# Patient Record
Sex: FEMALE | Race: BLACK OR AFRICAN AMERICAN | NOT HISPANIC OR LATINO | Employment: UNEMPLOYED | RURAL
[De-identification: names, ages, dates, MRNs, and addresses within clinical notes are randomized per-mention and may not be internally consistent; named-entity substitution may affect disease eponyms.]

---

## 2016-10-06 LAB
CRC RECOMMENDATION EXT: NORMAL
HM COLONOSCOPY: NORMAL

## 2018-06-26 ENCOUNTER — HISTORICAL (OUTPATIENT)
Dept: ADMINISTRATIVE | Facility: HOSPITAL | Age: 53
End: 2018-06-26

## 2018-06-28 LAB
LAB AP CLINICAL INFORMATION: NORMAL
LAB AP GENERAL CAT - HISTORICAL: NORMAL
LAB AP INTERPRETATION/RESULT - HISTORICAL: NEGATIVE
LAB AP SPECIMEN ADEQUACY - HISTORICAL: NORMAL
LAB AP SPECIMEN SUBMITTED - HISTORICAL: NORMAL

## 2018-08-06 LAB — HM MAMMOGRAM: NORMAL

## 2020-03-11 ENCOUNTER — HISTORICAL (OUTPATIENT)
Dept: ADMINISTRATIVE | Facility: HOSPITAL | Age: 55
End: 2020-03-11

## 2020-05-14 ENCOUNTER — HISTORICAL (OUTPATIENT)
Dept: ADMINISTRATIVE | Facility: HOSPITAL | Age: 55
End: 2020-05-14

## 2020-06-01 ENCOUNTER — HISTORICAL (OUTPATIENT)
Dept: ADMINISTRATIVE | Facility: HOSPITAL | Age: 55
End: 2020-06-01

## 2020-06-01 LAB
GLUCOSE SERPL-MCNC: 113 MG/DL (ref 70–105)
GLUCOSE SERPL-MCNC: 149 MG/DL (ref 70–105)

## 2020-08-11 ENCOUNTER — HISTORICAL (OUTPATIENT)
Dept: ADMINISTRATIVE | Facility: HOSPITAL | Age: 55
End: 2020-08-11

## 2020-08-31 ENCOUNTER — HISTORICAL (OUTPATIENT)
Dept: ADMINISTRATIVE | Facility: HOSPITAL | Age: 55
End: 2020-08-31

## 2020-09-11 ENCOUNTER — HISTORICAL (OUTPATIENT)
Dept: ADMINISTRATIVE | Facility: HOSPITAL | Age: 55
End: 2020-09-11

## 2020-09-11 LAB — GLUCOSE SERPL-MCNC: 99 MG/DL (ref 70–105)

## 2020-09-14 LAB

## 2020-09-28 ENCOUNTER — HISTORICAL (OUTPATIENT)
Dept: ADMINISTRATIVE | Facility: HOSPITAL | Age: 55
End: 2020-09-28

## 2020-11-24 ENCOUNTER — HISTORICAL (OUTPATIENT)
Dept: ADMINISTRATIVE | Facility: HOSPITAL | Age: 55
End: 2020-11-24

## 2021-05-10 VITALS
HEART RATE: 85 BPM | OXYGEN SATURATION: 99 % | HEIGHT: 59 IN | WEIGHT: 207 LBS | TEMPERATURE: 98 F | SYSTOLIC BLOOD PRESSURE: 115 MMHG | BODY MASS INDEX: 41.73 KG/M2 | RESPIRATION RATE: 20 BRPM | DIASTOLIC BLOOD PRESSURE: 80 MMHG

## 2021-05-10 RX ORDER — LOVASTATIN 40 MG/1
40 TABLET ORAL NIGHTLY
COMMUNITY
End: 2021-05-17 | Stop reason: SDUPTHER

## 2021-05-10 RX ORDER — DAPAGLIFLOZIN 10 MG/1
10 TABLET, FILM COATED ORAL DAILY
COMMUNITY
End: 2021-05-18

## 2021-05-10 RX ORDER — ASPIRIN 81 MG/1
81 TABLET ORAL DAILY
COMMUNITY
End: 2021-05-17 | Stop reason: SDUPTHER

## 2021-05-10 RX ORDER — HYDROCHLOROTHIAZIDE 25 MG/1
25 TABLET ORAL DAILY
COMMUNITY
End: 2021-05-17 | Stop reason: SDUPTHER

## 2021-05-10 RX ORDER — METFORMIN HYDROCHLORIDE 1000 MG/1
1000 TABLET ORAL 2 TIMES DAILY
COMMUNITY
End: 2021-05-17 | Stop reason: SDUPTHER

## 2021-05-10 RX ORDER — ALBUTEROL SULFATE 90 UG/1
2 AEROSOL, METERED RESPIRATORY (INHALATION) EVERY 6 HOURS PRN
COMMUNITY
End: 2021-05-17 | Stop reason: SDUPTHER

## 2021-05-10 RX ORDER — ESOMEPRAZOLE MAGNESIUM 40 MG/1
40 GRANULE, DELAYED RELEASE ORAL DAILY
COMMUNITY
End: 2021-05-17 | Stop reason: SDUPTHER

## 2021-05-10 RX ORDER — ERGOCALCIFEROL 1.25 MG/1
50000 CAPSULE ORAL
COMMUNITY
End: 2021-05-17 | Stop reason: SDUPTHER

## 2021-05-17 ENCOUNTER — OFFICE VISIT (OUTPATIENT)
Dept: FAMILY MEDICINE | Facility: CLINIC | Age: 56
End: 2021-05-17
Payer: MEDICARE

## 2021-05-17 VITALS
BODY MASS INDEX: 44.23 KG/M2 | SYSTOLIC BLOOD PRESSURE: 114 MMHG | DIASTOLIC BLOOD PRESSURE: 77 MMHG | WEIGHT: 205 LBS | HEART RATE: 73 BPM | TEMPERATURE: 97 F | HEIGHT: 57 IN | RESPIRATION RATE: 20 BRPM

## 2021-05-17 DIAGNOSIS — E11.69 HYPERLIPIDEMIA ASSOCIATED WITH TYPE 2 DIABETES MELLITUS: ICD-10-CM

## 2021-05-17 DIAGNOSIS — I10 ESSENTIAL HYPERTENSION: ICD-10-CM

## 2021-05-17 DIAGNOSIS — E11.65 TYPE 2 DIABETES MELLITUS WITH HYPERGLYCEMIA, WITHOUT LONG-TERM CURRENT USE OF INSULIN: Primary | ICD-10-CM

## 2021-05-17 DIAGNOSIS — K21.9 GASTROESOPHAGEAL REFLUX DISEASE, UNSPECIFIED WHETHER ESOPHAGITIS PRESENT: ICD-10-CM

## 2021-05-17 DIAGNOSIS — E55.9 VITAMIN D DEFICIENCY: ICD-10-CM

## 2021-05-17 DIAGNOSIS — E78.5 HYPERLIPIDEMIA ASSOCIATED WITH TYPE 2 DIABETES MELLITUS: ICD-10-CM

## 2021-05-17 DIAGNOSIS — E53.8 B12 DEFICIENCY: ICD-10-CM

## 2021-05-17 LAB
ALBUMIN SERPL BCP-MCNC: 3.5 G/DL (ref 3.5–5)
ALBUMIN/GLOB SERPL: 0.8 {RATIO}
ALP SERPL-CCNC: 122 U/L (ref 41–108)
ALT SERPL W P-5'-P-CCNC: 23 U/L (ref 13–56)
ANION GAP SERPL CALCULATED.3IONS-SCNC: 13 MMOL/L (ref 7–16)
AST SERPL W P-5'-P-CCNC: 12 U/L (ref 15–37)
BILIRUB SERPL-MCNC: 0.4 MG/DL (ref 0–1.2)
BUN SERPL-MCNC: 22 MG/DL (ref 7–18)
BUN/CREAT SERPL: 28 (ref 6–20)
CALCIUM SERPL-MCNC: 9.4 MG/DL (ref 8.5–10.1)
CHLORIDE SERPL-SCNC: 103 MMOL/L (ref 98–107)
CHOLEST SERPL-MCNC: 185 MG/DL (ref 0–200)
CHOLEST/HDLC SERPL: 2.6 {RATIO}
CO2 SERPL-SCNC: 27 MMOL/L (ref 21–32)
CREAT SERPL-MCNC: 0.79 MG/DL (ref 0.55–1.02)
CREAT UR-MCNC: 153 MG/DL (ref 28–219)
EST. AVERAGE GLUCOSE BLD GHB EST-MCNC: 137 MG/DL
GLOBULIN SER-MCNC: 4.4 G/DL (ref 2–4)
GLUCOSE SERPL-MCNC: 129 MG/DL (ref 74–106)
HBA1C MFR BLD HPLC: 6.7 % (ref 4.5–6.6)
HDLC SERPL-MCNC: 71 MG/DL (ref 40–60)
LDLC SERPL CALC-MCNC: 100 MG/DL
LDLC/HDLC SERPL: 1.4 {RATIO}
MICROALBUMIN UR-MCNC: 0.8 MG/DL (ref 0–2.8)
MICROALBUMIN/CREAT RATIO PNL UR: 5.2 MG/G (ref 0–30)
NONHDLC SERPL-MCNC: 114 MG/DL
POTASSIUM SERPL-SCNC: 3.7 MMOL/L (ref 3.5–5.1)
PROT SERPL-MCNC: 7.9 G/DL (ref 6.4–8.2)
SODIUM SERPL-SCNC: 139 MMOL/L (ref 136–145)
TRIGL SERPL-MCNC: 69 MG/DL (ref 35–150)
VLDLC SERPL-MCNC: 14 MG/DL

## 2021-05-17 PROCEDURE — 83036 HEMOGLOBIN A1C: ICD-10-PCS | Mod: ,,, | Performed by: CLINICAL MEDICAL LABORATORY

## 2021-05-17 PROCEDURE — 99213 PR OFFICE/OUTPT VISIT, EST, LEVL III, 20-29 MIN: ICD-10-PCS | Mod: ,,, | Performed by: NURSE PRACTITIONER

## 2021-05-17 PROCEDURE — 80061 LIPID PANEL: ICD-10-PCS | Mod: ,,, | Performed by: CLINICAL MEDICAL LABORATORY

## 2021-05-17 PROCEDURE — 83036 HEMOGLOBIN GLYCOSYLATED A1C: CPT | Mod: ,,, | Performed by: CLINICAL MEDICAL LABORATORY

## 2021-05-17 PROCEDURE — 80061 LIPID PANEL: CPT | Mod: ,,, | Performed by: CLINICAL MEDICAL LABORATORY

## 2021-05-17 PROCEDURE — 99213 OFFICE O/P EST LOW 20 MIN: CPT | Mod: ,,, | Performed by: NURSE PRACTITIONER

## 2021-05-17 PROCEDURE — 82043 UR ALBUMIN QUANTITATIVE: CPT | Mod: ,,, | Performed by: CLINICAL MEDICAL LABORATORY

## 2021-05-17 PROCEDURE — 80053 COMPREHEN METABOLIC PANEL: CPT | Mod: ,,, | Performed by: CLINICAL MEDICAL LABORATORY

## 2021-05-17 PROCEDURE — 82570 MICROALBUMIN / CREATININE RATIO URINE: ICD-10-PCS | Mod: ,,, | Performed by: CLINICAL MEDICAL LABORATORY

## 2021-05-17 PROCEDURE — 80053 COMPREHENSIVE METABOLIC PANEL: ICD-10-PCS | Mod: ,,, | Performed by: CLINICAL MEDICAL LABORATORY

## 2021-05-17 PROCEDURE — 82043 MICROALBUMIN / CREATININE RATIO URINE: ICD-10-PCS | Mod: ,,, | Performed by: CLINICAL MEDICAL LABORATORY

## 2021-05-17 PROCEDURE — 82570 ASSAY OF URINE CREATININE: CPT | Mod: ,,, | Performed by: CLINICAL MEDICAL LABORATORY

## 2021-05-17 RX ORDER — HYDROCHLOROTHIAZIDE 25 MG/1
25 TABLET ORAL DAILY
Qty: 90 TABLET | Refills: 0 | Status: SHIPPED | OUTPATIENT
Start: 2021-05-17 | End: 2021-08-17 | Stop reason: SDUPTHER

## 2021-05-17 RX ORDER — ESOMEPRAZOLE MAGNESIUM 40 MG/1
40 GRANULE, DELAYED RELEASE ORAL DAILY
Qty: 90 EACH | Refills: 0 | Status: SHIPPED | OUTPATIENT
Start: 2021-05-17 | End: 2021-08-17 | Stop reason: SDUPTHER

## 2021-05-17 RX ORDER — GLIPIZIDE 5 MG/1
5 TABLET ORAL 2 TIMES DAILY WITH MEALS
COMMUNITY
Start: 2021-05-03 | End: 2021-08-17 | Stop reason: SDUPTHER

## 2021-05-17 RX ORDER — ALBUTEROL SULFATE 90 UG/1
2 AEROSOL, METERED RESPIRATORY (INHALATION) EVERY 6 HOURS PRN
Qty: 18 G | Refills: 0 | Status: SHIPPED | OUTPATIENT
Start: 2021-05-17 | End: 2022-02-01

## 2021-05-17 RX ORDER — ERGOCALCIFEROL 1.25 MG/1
50000 CAPSULE ORAL
Qty: 90 CAPSULE | Refills: 0 | Status: SHIPPED | OUTPATIENT
Start: 2021-05-17 | End: 2021-08-17 | Stop reason: SDUPTHER

## 2021-05-17 RX ORDER — METFORMIN HYDROCHLORIDE 1000 MG/1
1000 TABLET ORAL 2 TIMES DAILY
Qty: 180 TABLET | Refills: 0 | Status: SHIPPED | OUTPATIENT
Start: 2021-05-17 | End: 2021-08-17 | Stop reason: SDUPTHER

## 2021-05-17 RX ORDER — LOVASTATIN 40 MG/1
40 TABLET ORAL NIGHTLY
Qty: 90 TABLET | Refills: 0 | Status: SHIPPED | OUTPATIENT
Start: 2021-05-17 | End: 2021-08-17 | Stop reason: SDUPTHER

## 2021-05-17 RX ORDER — ASPIRIN 81 MG/1
81 TABLET ORAL DAILY
Qty: 90 TABLET | Refills: 0 | Status: SHIPPED | OUTPATIENT
Start: 2021-05-17 | End: 2022-05-04 | Stop reason: SDUPTHER

## 2021-05-17 RX ORDER — HYDROCODONE BITARTRATE AND ACETAMINOPHEN 10; 325 MG/1; MG/1
1 TABLET ORAL DAILY PRN
COMMUNITY
Start: 2021-05-08

## 2021-05-18 ENCOUNTER — OFFICE VISIT (OUTPATIENT)
Dept: CARDIOLOGY | Facility: CLINIC | Age: 56
End: 2021-05-18
Payer: MEDICARE

## 2021-05-18 VITALS
WEIGHT: 206 LBS | BODY MASS INDEX: 44.44 KG/M2 | HEART RATE: 71 BPM | OXYGEN SATURATION: 98 % | DIASTOLIC BLOOD PRESSURE: 60 MMHG | SYSTOLIC BLOOD PRESSURE: 94 MMHG | HEIGHT: 57 IN

## 2021-05-18 DIAGNOSIS — I10 HYPERTENSION, UNSPECIFIED TYPE: Primary | ICD-10-CM

## 2021-05-18 PROCEDURE — 93010 ELECTROCARDIOGRAM REPORT: CPT | Mod: S$PBB,,, | Performed by: INTERNAL MEDICINE

## 2021-05-18 PROCEDURE — 93010 EKG 12-LEAD: ICD-10-PCS | Mod: S$PBB,,, | Performed by: INTERNAL MEDICINE

## 2021-05-18 PROCEDURE — 99213 PR OFFICE/OUTPT VISIT, EST, LEVL III, 20-29 MIN: ICD-10-PCS | Mod: S$PBB,,, | Performed by: NURSE PRACTITIONER

## 2021-05-18 PROCEDURE — 99213 OFFICE O/P EST LOW 20 MIN: CPT | Mod: PBBFAC | Performed by: NURSE PRACTITIONER

## 2021-05-18 PROCEDURE — 99213 OFFICE O/P EST LOW 20 MIN: CPT | Mod: S$PBB,,, | Performed by: NURSE PRACTITIONER

## 2021-05-18 PROCEDURE — 93005 ELECTROCARDIOGRAM TRACING: CPT | Mod: PBBFAC | Performed by: INTERNAL MEDICINE

## 2021-05-25 ENCOUNTER — TELEPHONE (OUTPATIENT)
Dept: FAMILY MEDICINE | Facility: CLINIC | Age: 56
End: 2021-05-25

## 2021-05-25 VITALS
TEMPERATURE: 98 F | DIASTOLIC BLOOD PRESSURE: 80 MMHG | HEART RATE: 71 BPM | WEIGHT: 207 LBS | BODY MASS INDEX: 41.73 KG/M2 | SYSTOLIC BLOOD PRESSURE: 115 MMHG | HEIGHT: 59 IN

## 2021-05-25 RX ORDER — MELOXICAM 15 MG/1
TABLET ORAL
COMMUNITY
Start: 2021-01-08 | End: 2021-08-17 | Stop reason: SDUPTHER

## 2021-05-25 RX ORDER — OMEPRAZOLE 40 MG/1
CAPSULE, DELAYED RELEASE ORAL
COMMUNITY
Start: 2021-01-08 | End: 2022-05-04

## 2021-05-25 RX ORDER — ESOMEPRAZOLE MAGNESIUM 40 MG/1
CAPSULE, DELAYED RELEASE ORAL
COMMUNITY
Start: 2021-05-17 | End: 2021-08-17 | Stop reason: SDUPTHER

## 2021-05-25 RX ORDER — LISINOPRIL 10 MG/1
TABLET ORAL
COMMUNITY
Start: 2021-01-08 | End: 2021-08-17 | Stop reason: SDUPTHER

## 2021-06-01 ENCOUNTER — TELEPHONE (OUTPATIENT)
Dept: FAMILY MEDICINE | Facility: CLINIC | Age: 56
End: 2021-06-01

## 2021-06-07 ENCOUNTER — TELEPHONE (OUTPATIENT)
Dept: FAMILY MEDICINE | Facility: CLINIC | Age: 56
End: 2021-06-07

## 2021-06-07 ENCOUNTER — OFFICE VISIT (OUTPATIENT)
Dept: FAMILY MEDICINE | Facility: CLINIC | Age: 56
End: 2021-06-07
Payer: MEDICARE

## 2021-06-07 VITALS
WEIGHT: 204 LBS | HEART RATE: 83 BPM | BODY MASS INDEX: 44.01 KG/M2 | HEIGHT: 57 IN | RESPIRATION RATE: 20 BRPM | SYSTOLIC BLOOD PRESSURE: 111 MMHG | DIASTOLIC BLOOD PRESSURE: 79 MMHG | TEMPERATURE: 98 F

## 2021-06-07 DIAGNOSIS — F32.A DEPRESSION, UNSPECIFIED DEPRESSION TYPE: Primary | ICD-10-CM

## 2021-06-07 DIAGNOSIS — N76.0 ACUTE VAGINITIS: ICD-10-CM

## 2021-06-07 PROCEDURE — 99213 PR OFFICE/OUTPT VISIT, EST, LEVL III, 20-29 MIN: ICD-10-PCS | Mod: ,,, | Performed by: NURSE PRACTITIONER

## 2021-06-07 PROCEDURE — 99213 OFFICE O/P EST LOW 20 MIN: CPT | Mod: ,,, | Performed by: NURSE PRACTITIONER

## 2021-06-07 RX ORDER — CARIPRAZINE 1.5 MG/1
1.5 CAPSULE, GELATIN COATED ORAL DAILY
Qty: 90 CAPSULE | Refills: 0 | Status: SHIPPED | OUTPATIENT
Start: 2021-06-07 | End: 2021-07-07

## 2021-06-07 RX ORDER — FLUCONAZOLE 200 MG/1
200 TABLET ORAL DAILY
Qty: 7 TABLET | Refills: 0 | Status: SHIPPED | OUTPATIENT
Start: 2021-06-07 | End: 2021-06-14

## 2021-06-15 ENCOUNTER — TELEPHONE (OUTPATIENT)
Dept: FAMILY MEDICINE | Facility: CLINIC | Age: 56
End: 2021-06-15

## 2021-08-17 ENCOUNTER — OFFICE VISIT (OUTPATIENT)
Dept: FAMILY MEDICINE | Facility: CLINIC | Age: 56
End: 2021-08-17
Payer: MEDICARE

## 2021-08-17 VITALS
HEART RATE: 65 BPM | BODY MASS INDEX: 46.17 KG/M2 | DIASTOLIC BLOOD PRESSURE: 68 MMHG | HEIGHT: 57 IN | RESPIRATION RATE: 18 BRPM | WEIGHT: 214 LBS | TEMPERATURE: 98 F | SYSTOLIC BLOOD PRESSURE: 101 MMHG

## 2021-08-17 DIAGNOSIS — E11.69 HYPERLIPIDEMIA ASSOCIATED WITH TYPE 2 DIABETES MELLITUS: ICD-10-CM

## 2021-08-17 DIAGNOSIS — I10 ESSENTIAL HYPERTENSION: ICD-10-CM

## 2021-08-17 DIAGNOSIS — K58.9 IRRITABLE BOWEL SYNDROME, UNSPECIFIED TYPE: Primary | ICD-10-CM

## 2021-08-17 DIAGNOSIS — K21.9 GASTROESOPHAGEAL REFLUX DISEASE, UNSPECIFIED WHETHER ESOPHAGITIS PRESENT: ICD-10-CM

## 2021-08-17 DIAGNOSIS — E11.65 TYPE 2 DIABETES MELLITUS WITH HYPERGLYCEMIA, WITHOUT LONG-TERM CURRENT USE OF INSULIN: ICD-10-CM

## 2021-08-17 DIAGNOSIS — E78.5 HYPERLIPIDEMIA ASSOCIATED WITH TYPE 2 DIABETES MELLITUS: ICD-10-CM

## 2021-08-17 DIAGNOSIS — E55.9 VITAMIN D DEFICIENCY: ICD-10-CM

## 2021-08-17 LAB
ALBUMIN SERPL BCP-MCNC: 3.2 G/DL (ref 3.5–5)
ALBUMIN/GLOB SERPL: 0.7 {RATIO}
ALP SERPL-CCNC: 102 U/L (ref 41–108)
ALT SERPL W P-5'-P-CCNC: 26 U/L (ref 13–56)
ANION GAP SERPL CALCULATED.3IONS-SCNC: 11 MMOL/L (ref 7–16)
AST SERPL W P-5'-P-CCNC: 15 U/L (ref 15–37)
BILIRUB SERPL-MCNC: 0.1 MG/DL (ref 0–1.2)
BUN SERPL-MCNC: 22 MG/DL (ref 7–18)
BUN/CREAT SERPL: 24 (ref 6–20)
CALCIUM SERPL-MCNC: 9.2 MG/DL (ref 8.5–10.1)
CHLORIDE SERPL-SCNC: 105 MMOL/L (ref 98–107)
CHOLEST SERPL-MCNC: 174 MG/DL (ref 0–200)
CHOLEST/HDLC SERPL: 2.3 {RATIO}
CO2 SERPL-SCNC: 25 MMOL/L (ref 21–32)
CREAT SERPL-MCNC: 0.92 MG/DL (ref 0.55–1.02)
GLOBULIN SER-MCNC: 4.3 G/DL (ref 2–4)
GLUCOSE SERPL-MCNC: 158 MG/DL (ref 74–106)
HDLC SERPL-MCNC: 75 MG/DL (ref 40–60)
LDLC SERPL CALC-MCNC: 87 MG/DL
LDLC/HDLC SERPL: 1.2 {RATIO}
NONHDLC SERPL-MCNC: 99 MG/DL
POTASSIUM SERPL-SCNC: 4.4 MMOL/L (ref 3.5–5.1)
PROT SERPL-MCNC: 7.5 G/DL (ref 6.4–8.2)
SODIUM SERPL-SCNC: 137 MMOL/L (ref 136–145)
TRIGL SERPL-MCNC: 61 MG/DL (ref 35–150)
VLDLC SERPL-MCNC: 12 MG/DL

## 2021-08-17 PROCEDURE — 80061 LIPID PANEL: ICD-10-PCS | Mod: ,,, | Performed by: CLINICAL MEDICAL LABORATORY

## 2021-08-17 PROCEDURE — 99214 PR OFFICE/OUTPT VISIT, EST, LEVL IV, 30-39 MIN: ICD-10-PCS | Mod: ,,, | Performed by: NURSE PRACTITIONER

## 2021-08-17 PROCEDURE — 82043 UR ALBUMIN QUANTITATIVE: CPT | Mod: ,,, | Performed by: CLINICAL MEDICAL LABORATORY

## 2021-08-17 PROCEDURE — 80053 COMPREHEN METABOLIC PANEL: CPT | Mod: ,,, | Performed by: CLINICAL MEDICAL LABORATORY

## 2021-08-17 PROCEDURE — 83036 HEMOGLOBIN GLYCOSYLATED A1C: CPT | Mod: ,,, | Performed by: CLINICAL MEDICAL LABORATORY

## 2021-08-17 PROCEDURE — 83036 HEMOGLOBIN A1C: ICD-10-PCS | Mod: ,,, | Performed by: CLINICAL MEDICAL LABORATORY

## 2021-08-17 PROCEDURE — 82043 MICROALBUMIN / CREATININE RATIO URINE: ICD-10-PCS | Mod: ,,, | Performed by: CLINICAL MEDICAL LABORATORY

## 2021-08-17 PROCEDURE — 82570 ASSAY OF URINE CREATININE: CPT | Mod: ,,, | Performed by: CLINICAL MEDICAL LABORATORY

## 2021-08-17 PROCEDURE — 82570 MICROALBUMIN / CREATININE RATIO URINE: ICD-10-PCS | Mod: ,,, | Performed by: CLINICAL MEDICAL LABORATORY

## 2021-08-17 PROCEDURE — 99214 OFFICE O/P EST MOD 30 MIN: CPT | Mod: ,,, | Performed by: NURSE PRACTITIONER

## 2021-08-17 PROCEDURE — 80061 LIPID PANEL: CPT | Mod: ,,, | Performed by: CLINICAL MEDICAL LABORATORY

## 2021-08-17 PROCEDURE — 80053 COMPREHENSIVE METABOLIC PANEL: ICD-10-PCS | Mod: ,,, | Performed by: CLINICAL MEDICAL LABORATORY

## 2021-08-17 RX ORDER — GLIPIZIDE 5 MG/1
5 TABLET ORAL 2 TIMES DAILY WITH MEALS
Qty: 180 TABLET | Refills: 0 | Status: SHIPPED | OUTPATIENT
Start: 2021-08-17 | End: 2021-11-16 | Stop reason: SDUPTHER

## 2021-08-17 RX ORDER — HYDROCHLOROTHIAZIDE 25 MG/1
25 TABLET ORAL DAILY
Qty: 90 TABLET | Refills: 0 | Status: SHIPPED | OUTPATIENT
Start: 2021-08-17 | End: 2021-11-16 | Stop reason: SDUPTHER

## 2021-08-17 RX ORDER — ERGOCALCIFEROL 1.25 MG/1
50000 CAPSULE ORAL
Qty: 90 CAPSULE | Refills: 0 | Status: SHIPPED | OUTPATIENT
Start: 2021-08-17 | End: 2022-05-04 | Stop reason: SDUPTHER

## 2021-08-17 RX ORDER — MELOXICAM 15 MG/1
15 TABLET ORAL DAILY
Qty: 90 TABLET | Refills: 1 | Status: SHIPPED | OUTPATIENT
Start: 2021-08-17 | End: 2022-02-01

## 2021-08-17 RX ORDER — ESOMEPRAZOLE MAGNESIUM 40 MG/1
40 GRANULE, DELAYED RELEASE ORAL DAILY
Qty: 90 EACH | Refills: 0 | Status: SHIPPED | OUTPATIENT
Start: 2021-08-17 | End: 2022-05-04

## 2021-08-17 RX ORDER — LISINOPRIL 10 MG/1
10 TABLET ORAL DAILY
Qty: 90 TABLET | Refills: 1 | Status: SHIPPED | OUTPATIENT
Start: 2021-08-17 | End: 2022-02-01

## 2021-08-17 RX ORDER — LOVASTATIN 40 MG/1
40 TABLET ORAL NIGHTLY
Qty: 90 TABLET | Refills: 0 | Status: SHIPPED | OUTPATIENT
Start: 2021-08-17 | End: 2021-11-16 | Stop reason: SDUPTHER

## 2021-08-17 RX ORDER — PLECANATIDE 3 MG/1
3 TABLET ORAL DAILY
Qty: 90 TABLET | Refills: 1 | Status: SHIPPED | OUTPATIENT
Start: 2021-08-17 | End: 2021-11-15

## 2021-08-17 RX ORDER — ESOMEPRAZOLE MAGNESIUM 40 MG/1
40 CAPSULE, DELAYED RELEASE ORAL DAILY
Qty: 90 CAPSULE | Refills: 1 | Status: SHIPPED | OUTPATIENT
Start: 2021-08-17 | End: 2022-02-01 | Stop reason: SDUPTHER

## 2021-08-17 RX ORDER — ZOSTER VACCINE RECOMBINANT, ADJUVANTED 50 MCG/0.5
0.5 KIT INTRAMUSCULAR ONCE
Qty: 1 EACH | Refills: 1 | Status: SHIPPED | OUTPATIENT
Start: 2021-08-17 | End: 2021-08-17

## 2021-08-17 RX ORDER — METFORMIN HYDROCHLORIDE 1000 MG/1
1000 TABLET ORAL 2 TIMES DAILY
Qty: 180 TABLET | Refills: 0 | Status: SHIPPED | OUTPATIENT
Start: 2021-08-17 | End: 2021-11-16 | Stop reason: SDUPTHER

## 2021-08-18 LAB
CREAT UR-MCNC: 116 MG/DL (ref 28–219)
EST. AVERAGE GLUCOSE BLD GHB EST-MCNC: 147 MG/DL
HBA1C MFR BLD HPLC: 7 % (ref 4.5–6.6)
MICROALBUMIN UR-MCNC: 0.6 MG/DL (ref 0–2.8)
MICROALBUMIN/CREAT RATIO PNL UR: 5.2 MG/G (ref 0–30)

## 2021-09-07 ENCOUNTER — HOSPITAL ENCOUNTER (OUTPATIENT)
Dept: RADIOLOGY | Facility: HOSPITAL | Age: 56
Discharge: HOME | End: 2021-09-07
Payer: MEDICARE

## 2021-09-07 VITALS — WEIGHT: 216 LBS | HEIGHT: 57 IN | BODY MASS INDEX: 46.6 KG/M2

## 2021-09-07 DIAGNOSIS — Z12.31 BREAST CANCER SCREENING BY MAMMOGRAM: ICD-10-CM

## 2021-09-07 PROCEDURE — 77067 SCR MAMMO BI INCL CAD: CPT | Mod: TC

## 2021-09-15 ENCOUNTER — OFFICE VISIT (OUTPATIENT)
Dept: FAMILY MEDICINE | Facility: CLINIC | Age: 56
End: 2021-09-15
Payer: MEDICARE

## 2021-09-15 VITALS
DIASTOLIC BLOOD PRESSURE: 74 MMHG | TEMPERATURE: 97 F | RESPIRATION RATE: 18 BRPM | HEIGHT: 57 IN | SYSTOLIC BLOOD PRESSURE: 118 MMHG | HEART RATE: 73 BPM | BODY MASS INDEX: 46.38 KG/M2 | WEIGHT: 215 LBS

## 2021-09-15 DIAGNOSIS — Z23 ENCOUNTER FOR IMMUNIZATION: Primary | ICD-10-CM

## 2021-09-15 DIAGNOSIS — Z12.4 ENCOUNTER FOR PAPANICOLAOU SMEAR OF CERVIX: Primary | ICD-10-CM

## 2021-09-15 DIAGNOSIS — Z11.59 SCREENING FOR VIRAL DISEASE: ICD-10-CM

## 2021-09-15 PROCEDURE — G0008 FLU VACCINE (QUAD) GREATER THAN OR EQUAL TO 3YO PRESERVATIVE FREE IM: ICD-10-PCS | Mod: ,,, | Performed by: NURSE PRACTITIONER

## 2021-09-15 PROCEDURE — 87591 N.GONORRHOEAE DNA AMP PROB: CPT | Mod: ,,, | Performed by: CLINICAL MEDICAL LABORATORY

## 2021-09-15 PROCEDURE — 87624 HPV HI-RISK TYP POOLED RSLT: CPT | Mod: ,,, | Performed by: CLINICAL MEDICAL LABORATORY

## 2021-09-15 PROCEDURE — G0008 ADMIN INFLUENZA VIRUS VAC: HCPCS | Mod: ,,, | Performed by: NURSE PRACTITIONER

## 2021-09-15 PROCEDURE — 87591 CHLAMYDIA/GONORRHOEAE(GC), PCR: ICD-10-PCS | Mod: ,,, | Performed by: CLINICAL MEDICAL LABORATORY

## 2021-09-15 PROCEDURE — 90686 FLU VACCINE (QUAD) GREATER THAN OR EQUAL TO 3YO PRESERVATIVE FREE IM: ICD-10-PCS | Mod: ,,, | Performed by: NURSE PRACTITIONER

## 2021-09-15 PROCEDURE — 99213 OFFICE O/P EST LOW 20 MIN: CPT | Mod: ,,, | Performed by: NURSE PRACTITIONER

## 2021-09-15 PROCEDURE — 87624 HUMAN PAPILLOMAVIRUS (HPV): ICD-10-PCS | Mod: ,,, | Performed by: CLINICAL MEDICAL LABORATORY

## 2021-09-15 PROCEDURE — 99213 PR OFFICE/OUTPT VISIT, EST, LEVL III, 20-29 MIN: ICD-10-PCS | Mod: ,,, | Performed by: NURSE PRACTITIONER

## 2021-09-15 PROCEDURE — G0123 SCREEN CERV/VAG THIN LAYER: HCPCS | Mod: GCY | Performed by: NURSE PRACTITIONER

## 2021-09-15 PROCEDURE — 90686 IIV4 VACC NO PRSV 0.5 ML IM: CPT | Mod: ,,, | Performed by: NURSE PRACTITIONER

## 2021-09-15 PROCEDURE — 87491 CHLMYD TRACH DNA AMP PROBE: CPT | Mod: ,,, | Performed by: CLINICAL MEDICAL LABORATORY

## 2021-09-15 PROCEDURE — 87491 CHLAMYDIA/GONORRHOEAE(GC), PCR: ICD-10-PCS | Mod: ,,, | Performed by: CLINICAL MEDICAL LABORATORY

## 2021-09-16 LAB
GH SERPL-MCNC: NORMAL NG/ML
INSULIN SERPL-ACNC: NORMAL U[IU]/ML
LAB AP CLINICAL INFORMATION: NORMAL
LAB AP GYN INTERPRETATION: NEGATIVE
LAB AP PAP DISCLAIMER COMMENTS: NORMAL
RENIN PLAS-CCNC: NORMAL NG/ML/H

## 2021-09-26 LAB
CHLAMYDIA BY PCR: NEGATIVE
HPV 16: NEGATIVE
HPV 18: POSITIVE
HPV OTHER: NEGATIVE
N. GONORRHOEAE (GC) BY PCR: NEGATIVE

## 2021-10-16 DIAGNOSIS — A63.0 HPV (HUMAN PAPILLOMA VIRUS) ANOGENITAL INFECTION: Primary | ICD-10-CM

## 2021-11-16 ENCOUNTER — OFFICE VISIT (OUTPATIENT)
Dept: FAMILY MEDICINE | Facility: CLINIC | Age: 56
End: 2021-11-16
Payer: MEDICARE

## 2021-11-16 VITALS
RESPIRATION RATE: 18 BRPM | DIASTOLIC BLOOD PRESSURE: 84 MMHG | HEIGHT: 57 IN | SYSTOLIC BLOOD PRESSURE: 116 MMHG | HEART RATE: 76 BPM | BODY MASS INDEX: 46.6 KG/M2 | TEMPERATURE: 98 F | WEIGHT: 216 LBS

## 2021-11-16 DIAGNOSIS — Z23 NEED FOR SHINGLES VACCINE: ICD-10-CM

## 2021-11-16 DIAGNOSIS — E11.65 TYPE 2 DIABETES MELLITUS WITH HYPERGLYCEMIA, WITHOUT LONG-TERM CURRENT USE OF INSULIN: ICD-10-CM

## 2021-11-16 DIAGNOSIS — Z11.59 SCREENING FOR VIRAL DISEASE: ICD-10-CM

## 2021-11-16 DIAGNOSIS — B97.7 HPV (HUMAN PAPILLOMA VIRUS) INFECTION: ICD-10-CM

## 2021-11-16 DIAGNOSIS — E11.69 HYPERLIPIDEMIA ASSOCIATED WITH TYPE 2 DIABETES MELLITUS: ICD-10-CM

## 2021-11-16 DIAGNOSIS — E78.5 HYPERLIPIDEMIA ASSOCIATED WITH TYPE 2 DIABETES MELLITUS: ICD-10-CM

## 2021-11-16 DIAGNOSIS — K21.9 GASTROESOPHAGEAL REFLUX DISEASE, UNSPECIFIED WHETHER ESOPHAGITIS PRESENT: ICD-10-CM

## 2021-11-16 DIAGNOSIS — I10 ESSENTIAL HYPERTENSION: Primary | ICD-10-CM

## 2021-11-16 LAB
ALBUMIN SERPL BCP-MCNC: 3.2 G/DL (ref 3.5–5)
ALBUMIN/GLOB SERPL: 0.7 {RATIO}
ALP SERPL-CCNC: 122 U/L (ref 46–118)
ALT SERPL W P-5'-P-CCNC: 25 U/L (ref 13–56)
ANION GAP SERPL CALCULATED.3IONS-SCNC: 12 MMOL/L (ref 7–16)
AST SERPL W P-5'-P-CCNC: 14 U/L (ref 15–37)
BASOPHILS # BLD AUTO: 0.04 K/UL (ref 0–0.2)
BASOPHILS NFR BLD AUTO: 0.6 % (ref 0–1)
BILIRUB SERPL-MCNC: 0.1 MG/DL (ref 0–1.2)
BUN SERPL-MCNC: 22 MG/DL (ref 7–18)
BUN/CREAT SERPL: 20 (ref 6–20)
CALCIUM SERPL-MCNC: 9.4 MG/DL (ref 8.5–10.1)
CHLORIDE SERPL-SCNC: 103 MMOL/L (ref 98–107)
CHOLEST SERPL-MCNC: 164 MG/DL (ref 0–200)
CHOLEST/HDLC SERPL: 2.5 {RATIO}
CO2 SERPL-SCNC: 27 MMOL/L (ref 21–32)
CREAT SERPL-MCNC: 1.08 MG/DL (ref 0.55–1.02)
CREAT UR-MCNC: 174 MG/DL (ref 28–219)
DIFFERENTIAL METHOD BLD: ABNORMAL
EOSINOPHIL # BLD AUTO: 0.2 K/UL (ref 0–0.5)
EOSINOPHIL NFR BLD AUTO: 2.8 % (ref 1–4)
ERYTHROCYTE [DISTWIDTH] IN BLOOD BY AUTOMATED COUNT: 17 % (ref 11.5–14.5)
EST. AVERAGE GLUCOSE BLD GHB EST-MCNC: 164 MG/DL
GLOBULIN SER-MCNC: 4.5 G/DL (ref 2–4)
GLUCOSE SERPL-MCNC: 153 MG/DL (ref 74–106)
HBA1C MFR BLD HPLC: 7.5 % (ref 4.5–6.6)
HCT VFR BLD AUTO: 37.9 % (ref 38–47)
HDLC SERPL-MCNC: 66 MG/DL (ref 40–60)
HGB BLD-MCNC: 11.3 G/DL (ref 12–16)
IMM GRANULOCYTES # BLD AUTO: 0.02 K/UL (ref 0–0.04)
IMM GRANULOCYTES NFR BLD: 0.3 % (ref 0–0.4)
LDLC SERPL CALC-MCNC: 78 MG/DL
LDLC/HDLC SERPL: 1.2 {RATIO}
LYMPHOCYTES # BLD AUTO: 2.79 K/UL (ref 1–4.8)
LYMPHOCYTES NFR BLD AUTO: 38.6 % (ref 27–41)
MCH RBC QN AUTO: 24.6 PG (ref 27–31)
MCHC RBC AUTO-ENTMCNC: 29.8 G/DL (ref 32–36)
MCV RBC AUTO: 82.6 FL (ref 80–96)
MICROALBUMIN UR-MCNC: 0.7 MG/DL (ref 0–2.8)
MICROALBUMIN/CREAT RATIO PNL UR: 4 MG/G (ref 0–30)
MONOCYTES # BLD AUTO: 0.6 K/UL (ref 0–0.8)
MONOCYTES NFR BLD AUTO: 8.3 % (ref 2–6)
MPC BLD CALC-MCNC: 11.4 FL (ref 9.4–12.4)
NEUTROPHILS # BLD AUTO: 3.58 K/UL (ref 1.8–7.7)
NEUTROPHILS NFR BLD AUTO: 49.4 % (ref 53–65)
NONHDLC SERPL-MCNC: 98 MG/DL
NRBC # BLD AUTO: 0 X10E3/UL
NRBC, AUTO (.00): 0 %
PLATELET # BLD AUTO: 317 K/UL (ref 150–400)
POTASSIUM SERPL-SCNC: 4.3 MMOL/L (ref 3.5–5.1)
PROT SERPL-MCNC: 7.7 G/DL (ref 6.4–8.2)
RBC # BLD AUTO: 4.59 M/UL (ref 4.2–5.4)
SODIUM SERPL-SCNC: 138 MMOL/L (ref 136–145)
TRIGL SERPL-MCNC: 99 MG/DL (ref 35–150)
VLDLC SERPL-MCNC: 20 MG/DL
WBC # BLD AUTO: 7.23 K/UL (ref 4.5–11)

## 2021-11-16 PROCEDURE — 80053 COMPREHEN METABOLIC PANEL: CPT | Mod: ,,, | Performed by: CLINICAL MEDICAL LABORATORY

## 2021-11-16 PROCEDURE — 99214 OFFICE O/P EST MOD 30 MIN: CPT | Mod: ,,, | Performed by: NURSE PRACTITIONER

## 2021-11-16 PROCEDURE — 80061 LIPID PANEL: CPT | Mod: ,,, | Performed by: CLINICAL MEDICAL LABORATORY

## 2021-11-16 PROCEDURE — 82043 UR ALBUMIN QUANTITATIVE: CPT | Mod: ,,, | Performed by: CLINICAL MEDICAL LABORATORY

## 2021-11-16 PROCEDURE — 82570 MICROALBUMIN / CREATININE RATIO URINE: ICD-10-PCS | Mod: ,,, | Performed by: CLINICAL MEDICAL LABORATORY

## 2021-11-16 PROCEDURE — 82043 MICROALBUMIN / CREATININE RATIO URINE: ICD-10-PCS | Mod: ,,, | Performed by: CLINICAL MEDICAL LABORATORY

## 2021-11-16 PROCEDURE — 82570 ASSAY OF URINE CREATININE: CPT | Mod: ,,, | Performed by: CLINICAL MEDICAL LABORATORY

## 2021-11-16 PROCEDURE — 80053 COMPREHENSIVE METABOLIC PANEL: ICD-10-PCS | Mod: ,,, | Performed by: CLINICAL MEDICAL LABORATORY

## 2021-11-16 PROCEDURE — 83036 HEMOGLOBIN A1C: ICD-10-PCS | Mod: ,,, | Performed by: CLINICAL MEDICAL LABORATORY

## 2021-11-16 PROCEDURE — 83036 HEMOGLOBIN GLYCOSYLATED A1C: CPT | Mod: ,,, | Performed by: CLINICAL MEDICAL LABORATORY

## 2021-11-16 PROCEDURE — 85025 COMPLETE CBC W/AUTO DIFF WBC: CPT | Mod: ,,, | Performed by: CLINICAL MEDICAL LABORATORY

## 2021-11-16 PROCEDURE — 99214 PR OFFICE/OUTPT VISIT, EST, LEVL IV, 30-39 MIN: ICD-10-PCS | Mod: ,,, | Performed by: NURSE PRACTITIONER

## 2021-11-16 PROCEDURE — 80061 LIPID PANEL: ICD-10-PCS | Mod: ,,, | Performed by: CLINICAL MEDICAL LABORATORY

## 2021-11-16 PROCEDURE — 85025 CBC WITH DIFFERENTIAL: ICD-10-PCS | Mod: ,,, | Performed by: CLINICAL MEDICAL LABORATORY

## 2021-11-16 RX ORDER — LOVASTATIN 40 MG/1
40 TABLET ORAL NIGHTLY
Qty: 90 TABLET | Refills: 0 | Status: SHIPPED | OUTPATIENT
Start: 2021-11-16 | End: 2022-05-04 | Stop reason: SDUPTHER

## 2021-11-16 RX ORDER — GLIPIZIDE 5 MG/1
5 TABLET ORAL 2 TIMES DAILY WITH MEALS
Qty: 180 TABLET | Refills: 0 | Status: SHIPPED | OUTPATIENT
Start: 2021-11-16 | End: 2022-04-12 | Stop reason: SDUPTHER

## 2021-11-16 RX ORDER — ZINC GLUCONATE 50 MG
50 TABLET ORAL DAILY
COMMUNITY
End: 2022-11-23 | Stop reason: SDUPTHER

## 2021-11-16 RX ORDER — METFORMIN HYDROCHLORIDE 1000 MG/1
1000 TABLET ORAL 2 TIMES DAILY
Qty: 180 TABLET | Refills: 0 | Status: SHIPPED | OUTPATIENT
Start: 2021-11-16 | End: 2022-04-12

## 2021-11-16 RX ORDER — ZOSTER VACCINE RECOMBINANT, ADJUVANTED 50 MCG/0.5
0.5 KIT INTRAMUSCULAR ONCE
Qty: 1 EACH | Refills: 0 | Status: SHIPPED | OUTPATIENT
Start: 2021-11-16 | End: 2021-11-16

## 2021-11-16 RX ORDER — HYDROCHLOROTHIAZIDE 25 MG/1
25 TABLET ORAL DAILY
Qty: 90 TABLET | Refills: 0 | Status: SHIPPED | OUTPATIENT
Start: 2021-11-16 | End: 2022-02-01 | Stop reason: SDUPTHER

## 2021-11-18 LAB
LEFT EYE DM RETINOPATHY: NORMAL
RIGHT EYE DM RETINOPATHY: NORMAL

## 2021-11-24 ENCOUNTER — PATIENT OUTREACH (OUTPATIENT)
Dept: ADMINISTRATIVE | Facility: HOSPITAL | Age: 56
End: 2021-11-24

## 2021-12-02 ENCOUNTER — APPOINTMENT (OUTPATIENT)
Dept: RADIOLOGY | Facility: CLINIC | Age: 56
End: 2021-12-02
Attending: NURSE PRACTITIONER
Payer: MEDICARE

## 2021-12-02 ENCOUNTER — OFFICE VISIT (OUTPATIENT)
Dept: FAMILY MEDICINE | Facility: CLINIC | Age: 56
End: 2021-12-02
Payer: MEDICARE

## 2021-12-02 VITALS
DIASTOLIC BLOOD PRESSURE: 84 MMHG | HEIGHT: 57 IN | RESPIRATION RATE: 18 BRPM | HEART RATE: 84 BPM | TEMPERATURE: 98 F | SYSTOLIC BLOOD PRESSURE: 113 MMHG | WEIGHT: 214 LBS | BODY MASS INDEX: 46.17 KG/M2

## 2021-12-02 DIAGNOSIS — N30.01 ACUTE CYSTITIS WITH HEMATURIA: ICD-10-CM

## 2021-12-02 DIAGNOSIS — R10.9 ABDOMINAL PAIN, UNSPECIFIED ABDOMINAL LOCATION: ICD-10-CM

## 2021-12-02 DIAGNOSIS — R10.9 ABDOMINAL PAIN, UNSPECIFIED ABDOMINAL LOCATION: Primary | ICD-10-CM

## 2021-12-02 PROCEDURE — 74018 XR KUB: ICD-10-PCS | Mod: 26,,, | Performed by: RADIOLOGY

## 2021-12-02 PROCEDURE — 74018 RADEX ABDOMEN 1 VIEW: CPT | Mod: 26,,, | Performed by: RADIOLOGY

## 2021-12-02 PROCEDURE — 99213 PR OFFICE/OUTPT VISIT, EST, LEVL III, 20-29 MIN: ICD-10-PCS | Mod: ,,, | Performed by: NURSE PRACTITIONER

## 2021-12-02 PROCEDURE — 99213 OFFICE O/P EST LOW 20 MIN: CPT | Mod: ,,, | Performed by: NURSE PRACTITIONER

## 2021-12-02 PROCEDURE — 74018 RADEX ABDOMEN 1 VIEW: CPT | Mod: TC,RHCUB,FY | Performed by: NURSE PRACTITIONER

## 2021-12-02 RX ORDER — CEFUROXIME AXETIL 500 MG/1
500 TABLET ORAL EVERY 12 HOURS
Qty: 14 TABLET | Refills: 0 | Status: SHIPPED | OUTPATIENT
Start: 2021-12-02 | End: 2021-12-09

## 2021-12-02 RX ORDER — PHENAZOPYRIDINE HYDROCHLORIDE 200 MG/1
200 TABLET, FILM COATED ORAL
Qty: 15 TABLET | Refills: 0 | Status: SHIPPED | OUTPATIENT
Start: 2021-12-02 | End: 2021-12-07

## 2021-12-02 RX ORDER — TAMSULOSIN HYDROCHLORIDE 0.4 MG/1
0.4 CAPSULE ORAL DAILY
Qty: 10 CAPSULE | Refills: 0 | Status: SHIPPED | OUTPATIENT
Start: 2021-12-02 | End: 2022-02-01

## 2021-12-13 ENCOUNTER — OFFICE VISIT (OUTPATIENT)
Dept: OBSTETRICS AND GYNECOLOGY | Facility: CLINIC | Age: 56
End: 2021-12-13
Payer: MEDICARE

## 2021-12-13 DIAGNOSIS — A63.0 HPV (HUMAN PAPILLOMA VIRUS) ANOGENITAL INFECTION: ICD-10-CM

## 2021-12-13 DIAGNOSIS — B97.7 HPV (HUMAN PAPILLOMA VIRUS) INFECTION: ICD-10-CM

## 2021-12-13 PROCEDURE — 99212 OFFICE O/P EST SF 10 MIN: CPT | Mod: PBBFAC | Performed by: STUDENT IN AN ORGANIZED HEALTH CARE EDUCATION/TRAINING PROGRAM

## 2021-12-13 PROCEDURE — 99204 PR OFFICE/OUTPT VISIT, NEW, LEVL IV, 45-59 MIN: ICD-10-PCS | Mod: S$PBB,,, | Performed by: STUDENT IN AN ORGANIZED HEALTH CARE EDUCATION/TRAINING PROGRAM

## 2021-12-13 PROCEDURE — 99204 OFFICE O/P NEW MOD 45 MIN: CPT | Mod: S$PBB,,, | Performed by: STUDENT IN AN ORGANIZED HEALTH CARE EDUCATION/TRAINING PROGRAM

## 2022-01-06 ENCOUNTER — PROCEDURE VISIT (OUTPATIENT)
Dept: OBSTETRICS AND GYNECOLOGY | Facility: CLINIC | Age: 57
End: 2022-01-06
Payer: MEDICARE

## 2022-01-06 VITALS
WEIGHT: 210 LBS | HEIGHT: 57 IN | RESPIRATION RATE: 15 BRPM | BODY MASS INDEX: 45.3 KG/M2 | DIASTOLIC BLOOD PRESSURE: 80 MMHG | SYSTOLIC BLOOD PRESSURE: 130 MMHG

## 2022-01-06 DIAGNOSIS — B97.7 HPV (HUMAN PAPILLOMA VIRUS) INFECTION: ICD-10-CM

## 2022-01-06 PROCEDURE — 57452 EXAM OF CERVIX W/SCOPE: CPT | Mod: S$PBB,,, | Performed by: STUDENT IN AN ORGANIZED HEALTH CARE EDUCATION/TRAINING PROGRAM

## 2022-01-06 PROCEDURE — 99499 UNLISTED E&M SERVICE: CPT | Mod: S$PBB,,, | Performed by: STUDENT IN AN ORGANIZED HEALTH CARE EDUCATION/TRAINING PROGRAM

## 2022-01-06 PROCEDURE — 57452 PR COLPOSCOPY,CERVIX W/ADJ VAGINA: ICD-10-PCS | Mod: S$PBB,,, | Performed by: STUDENT IN AN ORGANIZED HEALTH CARE EDUCATION/TRAINING PROGRAM

## 2022-01-06 PROCEDURE — 57452 EXAM OF CERVIX W/SCOPE: CPT | Mod: PBBFAC | Performed by: STUDENT IN AN ORGANIZED HEALTH CARE EDUCATION/TRAINING PROGRAM

## 2022-01-06 PROCEDURE — 99499 NO LOS: ICD-10-PCS | Mod: S$PBB,,, | Performed by: STUDENT IN AN ORGANIZED HEALTH CARE EDUCATION/TRAINING PROGRAM

## 2022-01-06 NOTE — PATIENT INSTRUCTIONS
"Patient Education       Cervical Cancer Screening Tests   The Basics   Written by the doctors and editors at [facility]   What is cervical cancer screening? -- Screening tests look for cancer cells in the cervix. The cervix is the bottom part of the uterus, where it meets the vagina (figure 1).  Screening tests also look for cells that could turn into cancer, called "precancer." They can find cervical cancer and precancer in the early stages, when it can be treated or even cured.  What tests are used to screen for cervical cancer? -- There are a few different ways to screen:  · Pap test - This is the most commonly used test for screening. It is sometimes called a "Pap smear." It involves taking cells from the surface of the cervix and sending them to a lab. Then, an expert will look at the cells under a microscope to see if they are abnormal.  · HPV test - HPV stands for "human papillomavirus." It is the virus that causes cervical cancer. An HPV test involves testing cells from the cervix for certain types of HPV.  · Combination test - This involves doing a Pap and HPV test at the same time.  What happens during a Pap or HPV test? -- For both types of tests, your doctor will need to take cells from the surface of your cervix. To do this, they will gently insert a device called a "speculum" into your vagina. The device helps to push apart the walls of your vagina so the doctor can see the cervix. Then, they will use a small tool to lightly scrape cells from the surface of your cervix. The tool looks like a small spatula or brush. This might be a little uncomfortable, but usually does not hurt.  When should I start being screened for cervical cancer? -- Most experts recommend that you start having Pap tests when you turn 21. Some experts recommend HPV tests instead of Pap tests, starting at age 25. But this option might not be available in many places. Your doctor or nurse can talk to you about your options.  You should " "start getting Pap tests at the recommended age, whether or not you have ever been sexually active. Also, you do not need to start cervical cancer screening before age 21, even if you became sexually active at a younger age.  What should I do to prepare for a Pap or HPV test? -- You do not need to do anything special to prepare. People sometimes hear that they should not have sex or put anything in their vagina for 2 days before a Pap test, but it turns out that is not necessary. Pap tests work fine even if you have had sex recently.  Your doctor might recommend scheduling your test when you do not expect to have your period. But don't worry if you do have your period on the day of the test. Screening can usually still be done even if you are bleeding. Your doctor can talk with you and let you know what to do.  How often should I be screened for cervical cancer? -- That depends on how old you are and what the results of your past Pap tests have been.  · If you are age 21 to 29, you should have a Pap test every 3 years. Or, if your doctor recommends HPV testing instead, you should have a test every 5 years beginning at age 25.  · If you are age 30 or older, you can have a Pap test every 3 years. The other options are having an HPV test every 5 years or a combination Pap and HPV test every 5 years.   · If you are age 65 or older, you can stop having Pap tests if:  ? You had Pap tests done regularly until you turned 65.  ? You had 3 normal Pap tests in a row, or 2 normal combination Pap and HPV tests over the past 10 years (with the most recent test within the past 5 years)   You might also get a Pap test for reasons other than cervical cancer screening. For example, if you have abnormal vaginal bleeding, your doctor might do a Pap test to try to figure out the cause.  Do I need to be screened for cervical cancer if I had a hysterectomy? -- If you have had surgery called a "hysterectomy" to remove your uterus, ask your " "doctor if you need to keep getting screened. After a hysterectomy, you probably do not need screening if:  · Your cervix was removed along with your uterus, and  · You did not have cervical cancer or precancer (sometimes called "dysplasia")   If you're not sure, your doctor can help you figure out if you need to continue screening.  Do I need to get screening tests if I had the HPV vaccine? -- Yes. Getting the HPV vaccine lowers your chances of getting cervical cancer. But it does not completely protect you. You should still be screened for cancer or precancer.  What if I have an abnormal Pap test result? -- First, you should know that abnormal Pap tests are common, and most people with an abnormal Pap test do not have cancer. If your Pap test has cells that look "abnormal," your doctor or nurse can do more tests to figure out what is causing this. They will decide what to do based on your age, what your Pap test shows, and the results of any other tests you have had.  Follow-up tests might include:  · An HPV test - If you haven't already had an HPV test, your doctor might order one. They might be able to do this on the cells already taken during your Pap test.   · Another Pap test in 12 months - Sometimes, if you wait a year and have another Pap test, you could find that the abnormal cells are back to normal. You might also have an HPV test at the same time.  · A colposcopy - For this test, the doctor or nurse will use a speculum to look at your cervix, just like during a Pap test. But they will look more closely using a device that looks like a microscope. It allows the doctor or nurse to see the cervix in more detail. During this test, the doctor or nurse might also take tiny samples of tissue from the cervix. This is called a "biopsy." Tissue from the biopsy can go to the lab and be checked for anything abnormal.  If it turns out that you have cervical cancer or precancer, there are effective treatments " available. If your condition was found early, there is a good chance you can be cured.  What if my HPV test comes back positive? -- First, it's essential to know that most people who have sex will be exposed to HPV at some point, and having HPV does not mean you will definitely get cancer. For most people, HPV infection goes away on its own. But for some people, it does not. Long-lasting HPV infection increases your risk of cancer over time.  If your HPV test comes back positive, your doctor or nurse will talk with you about what to do. This will partly depend on whether your Pap test results were abnormal. If your HPV test is positive but your Pap test is normal, you might need to repeat the tests after 1 year so your doctor can see if anything has changed.  All topics are updated as new evidence becomes available and our peer review process is complete.  This topic retrieved from [employer] on: Sep 21, 2021.  Topic 67722 Version 17.0  Release: 29.4.2 - C29.263  © 2021 UpToDate, Inc. and/or its affiliates. All rights reserved.  figure 1: Female reproductive anatomy     These are the internal organs that make up the female reproductive system.  Graphic 30794 Version 6.0    Consumer Information Use and Disclaimer   This information is not specific medical advice and does not replace information you receive from your health care provider. This is only a brief summary of general information. It does NOT include all information about conditions, illnesses, injuries, tests, procedures, treatments, therapies, discharge instructions or life-style choices that may apply to you. You must talk with your health care provider for complete information about your health and treatment options. This information should not be used to decide whether or not to accept your health care provider's advice, instructions or recommendations. Only your health care provider has the knowledge and training to provide advice that is right for you.  The use of this information is governed by the [employer] End User License Agreement, available at https://www.[employer]/en/solutions/[employer]/about/milka.The use of [employer] content is governed by the [employer] Terms of Use. ©2021 UpToDate, Inc. All rights reserved.  Copyright   © 2021 UpToDate, Inc. and/or its affiliates. All rights reserved.

## 2022-01-06 NOTE — PROCEDURES
COLPOSCOPY    Hailey Duval is a 56 y.o. female No obstetric history on file.  presents for colposcopy.  No LMP recorded. Patient is postmenopausal..  Her most recent pap smear shows NILM with HPV 18.      The abnormal test findings were discussed, as well as HPV infection, need for colposcopy and possible biopsies to determine the plan of care, treatments available, the minimal risk of bleeding and infection with colposcopy, and alternatives to colposcopy and she agrees to proceed.      UPT is negative    COLPOSCOPY EXAM:   TIME OUT PERFORMED.     No visible lesions. No lesions visualized with acetic acid. No decreased uptake of Lugol's. No biopsies were taken.     ECC was not performed    The speculum was removed.  The patient did tolerate the procedure well.

## 2022-02-01 ENCOUNTER — OFFICE VISIT (OUTPATIENT)
Dept: FAMILY MEDICINE | Facility: CLINIC | Age: 57
End: 2022-02-01
Payer: MEDICARE

## 2022-02-01 VITALS
TEMPERATURE: 97 F | HEART RATE: 78 BPM | SYSTOLIC BLOOD PRESSURE: 123 MMHG | DIASTOLIC BLOOD PRESSURE: 84 MMHG | HEIGHT: 61 IN | BODY MASS INDEX: 39.46 KG/M2 | RESPIRATION RATE: 18 BRPM | WEIGHT: 209 LBS

## 2022-02-01 DIAGNOSIS — E53.8 B12 DEFICIENCY: ICD-10-CM

## 2022-02-01 DIAGNOSIS — E11.65 TYPE 2 DIABETES MELLITUS WITH HYPERGLYCEMIA, WITHOUT LONG-TERM CURRENT USE OF INSULIN: ICD-10-CM

## 2022-02-01 DIAGNOSIS — E55.9 VITAMIN D DEFICIENCY: ICD-10-CM

## 2022-02-01 DIAGNOSIS — E11.69 HYPERLIPIDEMIA ASSOCIATED WITH TYPE 2 DIABETES MELLITUS: Primary | ICD-10-CM

## 2022-02-01 DIAGNOSIS — E78.5 HYPERLIPIDEMIA ASSOCIATED WITH TYPE 2 DIABETES MELLITUS: Primary | ICD-10-CM

## 2022-02-01 DIAGNOSIS — Z11.59 NEED FOR HEPATITIS C SCREENING TEST: ICD-10-CM

## 2022-02-01 DIAGNOSIS — I10 ESSENTIAL HYPERTENSION: ICD-10-CM

## 2022-02-01 DIAGNOSIS — R53.83 FATIGUE, UNSPECIFIED TYPE: ICD-10-CM

## 2022-02-01 DIAGNOSIS — Z11.4 ENCOUNTER FOR SCREENING FOR HIV: ICD-10-CM

## 2022-02-01 LAB
25(OH)D3 SERPL-MCNC: 82.9 NG/ML
ALBUMIN SERPL BCP-MCNC: 3.4 G/DL (ref 3.5–5)
ALBUMIN/GLOB SERPL: 0.8 {RATIO}
ALP SERPL-CCNC: 115 U/L (ref 46–118)
ALT SERPL W P-5'-P-CCNC: 27 U/L (ref 13–56)
ANION GAP SERPL CALCULATED.3IONS-SCNC: 10 MMOL/L (ref 7–16)
AST SERPL W P-5'-P-CCNC: 18 U/L (ref 15–37)
BILIRUB SERPL-MCNC: 0.4 MG/DL (ref 0–1.2)
BUN SERPL-MCNC: 17 MG/DL (ref 7–18)
BUN/CREAT SERPL: 17 (ref 6–20)
CALCIUM SERPL-MCNC: 9.5 MG/DL (ref 8.5–10.1)
CHLORIDE SERPL-SCNC: 100 MMOL/L (ref 98–107)
CHOLEST SERPL-MCNC: 179 MG/DL (ref 0–200)
CHOLEST/HDLC SERPL: 2.4 {RATIO}
CO2 SERPL-SCNC: 30 MMOL/L (ref 21–32)
CREAT SERPL-MCNC: 0.98 MG/DL (ref 0.55–1.02)
CREAT UR-MCNC: 139 MG/DL (ref 28–219)
EST. AVERAGE GLUCOSE BLD GHB EST-MCNC: 150 MG/DL
FOLATE SERPL-MCNC: 4.8 NG/ML (ref 3.1–17.5)
GLOBULIN SER-MCNC: 4.5 G/DL (ref 2–4)
GLUCOSE SERPL-MCNC: 155 MG/DL (ref 74–106)
HBA1C MFR BLD HPLC: 7.1 % (ref 4.5–6.6)
HDLC SERPL-MCNC: 74 MG/DL (ref 40–60)
HIV 1+O+2 AB SERPL QL: NORMAL
LDLC SERPL CALC-MCNC: 89 MG/DL
LDLC/HDLC SERPL: 1.2 {RATIO}
MICROALBUMIN UR-MCNC: <0.5 MG/DL (ref 0–2.8)
MICROALBUMIN/CREAT RATIO PNL UR: <3.6 MG/G (ref 0–30)
NONHDLC SERPL-MCNC: 105 MG/DL
POTASSIUM SERPL-SCNC: 3.8 MMOL/L (ref 3.5–5.1)
PROT SERPL-MCNC: 7.9 G/DL (ref 6.4–8.2)
SODIUM SERPL-SCNC: 136 MMOL/L (ref 136–145)
TRIGL SERPL-MCNC: 80 MG/DL (ref 35–150)
TSH SERPL DL<=0.005 MIU/L-ACNC: 1.69 UIU/ML (ref 0.36–3.74)
VIT B12 SERPL-MCNC: 359 PG/ML (ref 193–986)
VLDLC SERPL-MCNC: 16 MG/DL

## 2022-02-01 PROCEDURE — 82746 ASSAY OF FOLIC ACID SERUM: CPT | Mod: ,,, | Performed by: CLINICAL MEDICAL LABORATORY

## 2022-02-01 PROCEDURE — 84443 TSH: ICD-10-PCS | Mod: ,,, | Performed by: CLINICAL MEDICAL LABORATORY

## 2022-02-01 PROCEDURE — 82306 VITAMIN D: ICD-10-PCS | Mod: ,,, | Performed by: CLINICAL MEDICAL LABORATORY

## 2022-02-01 PROCEDURE — 87389 HIV-1 AG W/HIV-1&-2 AB AG IA: CPT | Mod: ,,, | Performed by: CLINICAL MEDICAL LABORATORY

## 2022-02-01 PROCEDURE — 82607 VITAMIN B-12: CPT | Mod: ,,, | Performed by: CLINICAL MEDICAL LABORATORY

## 2022-02-01 PROCEDURE — 86803 HEPATITIS C AB TEST: CPT | Mod: ,,, | Performed by: CLINICAL MEDICAL LABORATORY

## 2022-02-01 PROCEDURE — 86803 HEPATITIS C ANTIBODY: ICD-10-PCS | Mod: ,,, | Performed by: CLINICAL MEDICAL LABORATORY

## 2022-02-01 PROCEDURE — 82043 MICROALBUMIN / CREATININE RATIO URINE: ICD-10-PCS | Mod: ,,, | Performed by: CLINICAL MEDICAL LABORATORY

## 2022-02-01 PROCEDURE — 82043 UR ALBUMIN QUANTITATIVE: CPT | Mod: ,,, | Performed by: CLINICAL MEDICAL LABORATORY

## 2022-02-01 PROCEDURE — 82570 MICROALBUMIN / CREATININE RATIO URINE: ICD-10-PCS | Mod: ,,, | Performed by: CLINICAL MEDICAL LABORATORY

## 2022-02-01 PROCEDURE — 80061 LIPID PANEL: ICD-10-PCS | Mod: ,,, | Performed by: CLINICAL MEDICAL LABORATORY

## 2022-02-01 PROCEDURE — 80053 COMPREHENSIVE METABOLIC PANEL: ICD-10-PCS | Mod: ,,, | Performed by: CLINICAL MEDICAL LABORATORY

## 2022-02-01 PROCEDURE — 82570 ASSAY OF URINE CREATININE: CPT | Mod: ,,, | Performed by: CLINICAL MEDICAL LABORATORY

## 2022-02-01 PROCEDURE — 80053 COMPREHEN METABOLIC PANEL: CPT | Mod: ,,, | Performed by: CLINICAL MEDICAL LABORATORY

## 2022-02-01 PROCEDURE — 99214 OFFICE O/P EST MOD 30 MIN: CPT | Mod: ,,, | Performed by: NURSE PRACTITIONER

## 2022-02-01 PROCEDURE — 82607 VITAMIN B12/FOLATE, SERUM PANEL: ICD-10-PCS | Mod: ,,, | Performed by: CLINICAL MEDICAL LABORATORY

## 2022-02-01 PROCEDURE — 80061 LIPID PANEL: CPT | Mod: ,,, | Performed by: CLINICAL MEDICAL LABORATORY

## 2022-02-01 PROCEDURE — 99214 PR OFFICE/OUTPT VISIT, EST, LEVL IV, 30-39 MIN: ICD-10-PCS | Mod: ,,, | Performed by: NURSE PRACTITIONER

## 2022-02-01 PROCEDURE — 82306 VITAMIN D 25 HYDROXY: CPT | Mod: ,,, | Performed by: CLINICAL MEDICAL LABORATORY

## 2022-02-01 PROCEDURE — 82746 VITAMIN B12/FOLATE, SERUM PANEL: ICD-10-PCS | Mod: ,,, | Performed by: CLINICAL MEDICAL LABORATORY

## 2022-02-01 PROCEDURE — 83036 HEMOGLOBIN A1C: ICD-10-PCS | Mod: ,,, | Performed by: CLINICAL MEDICAL LABORATORY

## 2022-02-01 PROCEDURE — 83036 HEMOGLOBIN GLYCOSYLATED A1C: CPT | Mod: ,,, | Performed by: CLINICAL MEDICAL LABORATORY

## 2022-02-01 PROCEDURE — 84443 ASSAY THYROID STIM HORMONE: CPT | Mod: ,,, | Performed by: CLINICAL MEDICAL LABORATORY

## 2022-02-01 PROCEDURE — 87389 HIV 1 / 2 ANTIBODY: ICD-10-PCS | Mod: ,,, | Performed by: CLINICAL MEDICAL LABORATORY

## 2022-02-01 RX ORDER — HYDROCHLOROTHIAZIDE 25 MG/1
25 TABLET ORAL DAILY
Qty: 90 TABLET | Refills: 0 | Status: SHIPPED | OUTPATIENT
Start: 2022-02-01 | End: 2022-05-04 | Stop reason: SDUPTHER

## 2022-02-01 RX ORDER — TRAZODONE HYDROCHLORIDE 100 MG/1
100 TABLET ORAL NIGHTLY
Qty: 30 TABLET | Refills: 11 | Status: SHIPPED | OUTPATIENT
Start: 2022-02-01 | End: 2022-03-08 | Stop reason: SDUPTHER

## 2022-02-01 RX ORDER — ESOMEPRAZOLE MAGNESIUM 40 MG/1
40 CAPSULE, DELAYED RELEASE ORAL DAILY
Qty: 90 CAPSULE | Refills: 1 | Status: SHIPPED | OUTPATIENT
Start: 2022-02-01 | End: 2022-05-04 | Stop reason: SDUPTHER

## 2022-02-01 NOTE — ASSESSMENT & PLAN NOTE
Lab Results   Component Value Date    HGBA1C 7.5 (H) 11/16/2021    HGBA1C 7.0 (H) 08/17/2021    HGBA1C 6.7 (H) 05/17/2021       Wt Readings from Last 3 Encounters:   02/01/22 94.8 kg (209 lb)   01/06/22 95.3 kg (210 lb)   12/02/21 97.1 kg (214 lb)       Body mass index is 39.49 kg/m².    No results found for requested labs within last 720 hours.

## 2022-02-01 NOTE — PATIENT INSTRUCTIONS
Patient Education       Lowering Your Risk of High Blood Pressure   About this topic   High blood pressure happens when your heart must work harder than normal to pump blood to the body. Blood pressure measures the pressure in your arteries when your heart beats. Your arteries are tubes that carry blood from your heart to the rest of your body. If the arteries are clogged, stiff, or squeeze too tightly, the pressure goes up and your heart must work harder than normal.  Your blood pressure has two numbers. The top number is the systolic number. It measures the highest amount of pressure in the artery when the heart is beating. The second number or bottom number is the diastolic number. It is the lowest pressure in the artery when the heart is resting.  Sometimes diseases, like kidney disease or abnormal hormones, can lead to high blood pressure. Most people have no known cause or reason for high blood pressure.  In most cases, high blood pressure cannot be cured. You must control it with drugs and lifestyle changes. If high blood pressure is not controlled, it can lead to heart attack, stroke, and kidney problems.  Many people with high blood pressure do not feel sick and dont know that they have it until their blood pressure is checked. However, this does not mean that you do not need treatment for high blood pressure.  General   Many things can raise your chances of having high blood pressure. Some of them you can control and others you cannot. It is important to know about all of them.  Some health conditions may raise your chances for having high blood pressure. Take extra care and work with your doctor to keep these health problems under control. Your risk for high blood pressure is higher if you have:  · Diabetes  · Kidney disease  · High cholesterol  · Obstructive sleep apnea  · Hormone problems - thyroid disease, Cushings syndrome, acromegaly, hyperaldosteronism, and  pheochromocytoma  · Lupus  · Scleroderma  You may have control over some things that make you more likely to have high blood pressure. It is important to know about them and work to keep these problems under control. You are at a higher risk of high blood pressure if you:  · Smoke or use tobacco  · Use alcohol or illegal drugs  · Do not exercise regularly  · Are overweight  · Have a lot of stress in your life  · Have a poor diet or a diet high in salt or sodium  · Have a diet low in potassium  · Take certain medications  Some things you are not able to control, but they are important to know about. For example, men are more likely to have high blood pressure before about age 60. However, women are more likely to have high blood pressure after menopause. You are also at a higher risk for high blood pressure if you:  · Are older. Your risk gets higher the older you are.  · Have a family history of high blood pressure  · Are   What lifestyle changes are needed?   · You may be asked to get a blood pressure monitor to use at home. Learn how to use it and record your blood pressure results between doctor visits.  · Stop smoking and using tobacco. Smoking causes your arteries to narrow and raises your blood pressure. Talk to your doctor if you need help quitting.  · Limit how much alcohol you drink to no more than 1 drink a day for women or 2 drinks a day for men.  · Do not use illegal drugs. Talk to your doctor if you need help quitting.  · If you are under a great deal of stress, ask your doctor for advice on how to lower the stress. You may need to learn a variety of stress reduction methods, such as yoga, meditation, guided imagery, salinas chi, or even have a drug prescribed to help you.  · Eat a healthy, well-balanced diet. Try to limit how much salt or sodium you eat each day.  · Lose weight if you are overweight.  · Get regular exercise. This can help your heart pump better, lower your blood pressure,  and help you lose weight.  · Work with your doctor to treat problems like sleep apnea, diabetes, high cholesterol, and kidney problems.  · Talk with your doctor or pharmacist about all of your drugs, including over-the-counter medicines, to see if they may cause high blood pressure.  What drugs may be needed?   Your doctor may order drugs to:  · Lower blood pressure  · Control blood sugar  · Lower cholesterol levels  · Help with your mood  · Help you stop using tobacco  Take your drugs exactly as ordered. Controlling problems like high blood pressure, diabetes, or high cholesterol are all ways to lower your chances of having high blood pressure.  Will physical activity be limited?   It is good to get some kind of exercise each day. Walking, gardening, swimming, riding a bike, or dancing are all good ways to add exercise to your life. Always check with your doctor if you have questions about starting an exercise program.  What changes to diet are needed?   Eat a healthy diet. Talk to your doctor or a dietitian if you need to lose weight.  · Do not use salt on your food. Use herbs and spices to improve the taste.  · Do not eat more than 2.3 grams of sodium a day. If you have high blood pressure, aim for 1.5 grams of sodium a day. Read food labels to see how much sodium is in a food.  · Limit coffee, tea, and soda to 2 cups (480 mL) a day.  · Eat lots of fruits, vegetables, and low-fat dairy products.  · Avoid fatty foods, like fried foods or chips.  · Talk with your dietitian about the diet changes you need and the number of calories you should eat each day.  When do I need to call the doctor?   Activate the emergency medical system right away if you have signs of a heart attack. Call 911 in the [place] or [place]. The sooner treatment begins, the better your chances for recovery. Call for emergency help right away if you have:  · Signs of heart attack:  ? Chest pain  ? Pain in other areas of the upper body  (either or both arms, jaw, neck, etc.)  ? Trouble breathing  ? Fast heartbeat  ? Feeling dizzy  · Signs of stroke:  ? Sudden numbness or weakness of the face, arm, or leg, especially on one side of the body  ? Sudden confusion, trouble speaking or understanding  ? Sudden trouble seeing in one or both eyes  ? Sudden trouble walking, dizziness, loss of balance or coordination  ? Sudden severe headache with no known cause  ? Face droops on one side  Call your doctor if you have:  · Blood pressure that is 20 points higher than your normal top or bottom number  · Two blood pressure readings higher than 180/120  · Very bad headache  · Confusion  · Sudden change in hearing or eyesight  · Nosebleed  Where can I learn more?   American Heart Association  https://www.heart.org/en/health-topics/high-blood-pressure/changes-you-can-make-to-manage-high-blood-pressure   American Heart Association  https://www.heart.org/en/health-topics/high-blood-pressure/why-high-blood-pressure-is-a-silent-killer/know-your-risk-factors-for-high-blood-pressure   Centers for Disease Control and Prevention  https://www.cdc.gov/bloodpressure/risk_factors.htm   NHS Choices  https://www.nhs.uk/conditions/high-blood-pressure-hypertension/   Last Reviewed Date   2020-03-26  Consumer Information Use and Disclaimer   This information is not specific medical advice and does not replace information you receive from your health care provider. This is only a brief summary of general information. It does NOT include all information about conditions, illnesses, injuries, tests, procedures, treatments, therapies, discharge instructions or life-style choices that may apply to you. You must talk with your health care provider for complete information about your health and treatment options. This information should not be used to decide whether or not to accept your health care providers advice, instructions or recommendations. Only your health care provider has the  knowledge and training to provide advice that is right for you.  Copyright   Copyright © 2021 [employer] and its affiliates and/or licensors. All rights reserved.

## 2022-02-01 NOTE — PROGRESS NOTES
CHANDU Concepcion   [address] [place] [address]     PATIENT NAME: Hailey Duval  : 1965  DATE: 22  MRN: [id number]      Billing Provider: CHANDU Concepcion  Level of Service: [place] OFFICE/OUTPT VISIT, EST, LEVL IV, 30-39 MIN  Patient PCP Information     Provider PCP Type    CHANDU Concepcion General          Reason for Visit / Chief Complaint: Diabetes, Hypertension, and Hyperlipidemia       Update PCP  Update Chief Complaint         History of Present Illness / Problem Focused Workflow     Hailey Duval presents to the clinic with Diabetes, Hypertension, and Hyperlipidemia     HPI    Review of Systems     Review of Systems     Medical / Social / Family History     Past Medical History:   Diagnosis Date    Anemia     Anemia, vitamin B12 deficiency     Chronic idiopathic constipation     chronic kidney disease     Depression     Diabetes mellitus, type 2     Dysphagia     GERD (gastroesophageal reflux disease)     Hyperlipidemia     Hypertension     Left ventricular hypertrophy     Nonrheumatic tricuspid (valve) insufficiency     Obese     Osteoarthritis of knee, unspecified     Ulcer of heel and midfoot        Past Surgical History:   Procedure Laterality Date     SECTION      OTHER SURGICAL HISTORY      Foot and Toe surgery procedure screws in right foot        Social History  Ms.  reports that she has never smoked. She has never used smokeless tobacco. She reports current drug use. Drug: Marijuana. She reports that she does not drink alcohol.    Family History  Ms.'s family history includes Breast cancer in her sister; Diabetes in her mother; Glaucoma in her other; Heart disease in her other; Hyperlipidemia in her other; Hypertension in her father and mother.    Medications and Allergies     Medications  Outpatient Medications Marked as Taking for the 22 encounter (Office Visit) with CHANDU Concepcion   Medication Sig Dispense Refill    aspirin  "(ECOTRIN) 81 MG EC tablet Take 1 tablet (81 mg total) by mouth once daily. 90 tablet 0    blood sugar diagnostic Strp by Misc.(Non-Drug; Combo Route) route.      ergocalciferol (ERGOCALCIFEROL) 50,000 unit Cap Take 1 capsule (50,000 Units total) by mouth every 7 days. 90 capsule 0    glipiZIDE (GLUCOTROL) 5 MG tablet Take 1 tablet (5 mg total) by mouth 2 (two) times daily with meals. 180 tablet 0    HYDROcodone-acetaminophen (NORCO)  mg per tablet Take 1 tablet by mouth daily as needed.       lovastatin (MEVACOR) 40 MG tablet Take 1 tablet (40 mg total) by mouth every evening. 90 tablet 0    metFORMIN (GLUCOPHAGE) 1000 MG tablet Take 1 tablet (1,000 mg total) by mouth 2 (two) times a day. 180 tablet 0    zinc gluconate 50 mg tablet Take 50 mg by mouth once daily.      [DISCONTINUED] esomeprazole (NEXIUM) 40 MG capsule Take 1 capsule (40 mg total) by mouth once daily. 90 capsule 1    [DISCONTINUED] hydroCHLOROthiazide (HYDRODIURIL) 25 MG tablet Take 1 tablet (25 mg total) by mouth once daily. 90 tablet 0       Allergies  Review of patient's allergies indicates:  No Known Allergies    Physical Examination   /84 (BP Location: Left arm, Patient Position: Sitting, BP Method: Medium (Automatic))   Pulse 78   Temp 97.4 °F (36.3 °C) (Temporal)   Resp 18   Ht 5' 1" (1.549 m)   Wt 94.8 kg (209 lb)   BMI 39.49 kg/m²   Physical Exam     Assessment and Plan (including Health Maintenance)      Problem List  Smart Sets  Document Outside HM   :    Plan:         Health Maintenance Due   Topic Date Due    Hepatitis C Screening  Never done    HIV Screening  Never done    Shingles Vaccine (2 of 3) 05/10/2021       Problem List Items Addressed This Visit        Cardiac/Vascular    Essential hypertension    Current Assessment & Plan     Blood Pressure Trends are:  BP Readings from Last 6 Encounters:   02/01/22 123/84   01/06/22 130/80   12/02/21 113/84   11/16/21 116/84   09/15/21 118/74   08/17/21 101/68 "       Most Recent Chemistry:  CMP  Lab Results   Component Value Date     11/16/2021    K 4.3 11/16/2021     11/16/2021    CO2 27 11/16/2021     (H) 11/16/2021    BUN 22 (H) 11/16/2021    CREATININE 1.08 (H) 11/16/2021    CALCIUM 9.4 11/16/2021    PROT 7.7 11/16/2021    ALBUMIN 3.2 (L) 11/16/2021    BILITOT 0.1 11/16/2021    ALKPHOS 122 (H) 11/16/2021    AST 14 (L) 11/16/2021    ALT 25 11/16/2021    ANIONGAP 12 11/16/2021    ESTGFRAFRICA 68 11/16/2021      Blood Pressure Trends are:  BP Readings from Last 6 Encounters:   02/01/22 123/84   01/06/22 130/80   12/02/21 113/84   11/16/21 116/84   09/15/21 118/74   08/17/21 101/68       Most Recent Chemistry:  CMP  Lab Results   Component Value Date     11/16/2021    K 4.3 11/16/2021     11/16/2021    CO2 27 11/16/2021     (H) 11/16/2021    BUN 22 (H) 11/16/2021    CREATININE 1.08 (H) 11/16/2021    CALCIUM 9.4 11/16/2021    PROT 7.7 11/16/2021    ALBUMIN 3.2 (L) 11/16/2021    BILITOT 0.1 11/16/2021    ALKPHOS 122 (H) 11/16/2021    AST 14 (L) 11/16/2021    ALT 25 11/16/2021    ANIONGAP 12 11/16/2021    ESTGFRAFRICA 68 11/16/2021               Relevant Medications    hydroCHLOROthiazide (HYDRODIURIL) 25 MG tablet    Other Relevant Orders    Comprehensive Metabolic Panel    Hyperlipidemia associated with type 2 diabetes mellitus - Primary    Current Assessment & Plan       Lab Results   Component Value Date    CHOL 164 11/16/2021    CHOL 174 08/17/2021    CHOL 185 05/17/2021    TRIG 99 11/16/2021    TRIG 61 08/17/2021    TRIG 69 05/17/2021    HDL 66 (H) 11/16/2021    HDL 75 (H) 08/17/2021    HDL 71 (H) 05/17/2021    LDLCALC 78 11/16/2021    LDLCALC 87 08/17/2021    LDLCALC 100 05/17/2021    CHOLHDL 2.5 11/16/2021    CHOLHDL 2.3 08/17/2021    CHOLHDL 2.6 05/17/2021    NONHDLCHOL 98 11/16/2021    NONHDLCHOL 99 08/17/2021    NONHDLCHOL 114 05/17/2021            Relevant Orders    Lipid Panel       ID    Need for hepatitis C screening test     Relevant Orders    Hepatitis C Antibody    Encounter for screening for HIV    Relevant Orders    HIV 1/2 Ag/Ab (4th Gen)       Endocrine    Type 2 diabetes mellitus with hyperglycemia, without long-term current use of insulin    Current Assessment & Plan         Lab Results   Component Value Date    HGBA1C 7.5 (H) 11/16/2021    HGBA1C 7.0 (H) 08/17/2021    HGBA1C 6.7 (H) 05/17/2021       Wt Readings from Last 3 Encounters:   02/01/22 94.8 kg (209 lb)   01/06/22 95.3 kg (210 lb)   12/02/21 97.1 kg (214 lb)       Body mass index is 39.49 kg/m².    No results found for requested labs within last 720 hours.                        Relevant Orders    Hemoglobin A1C    Microalbumin/Creatinine Ratio, Urine    B12 deficiency    Relevant Orders    Vitamin B12 & Folate    Vitamin D deficiency    Relevant Orders    Vitamin D       Other    Fatigue    Relevant Orders    TSH          Health Maintenance Topics with due status: Not Due       Topic Last Completion Date    Colorectal Cancer Screening 10/06/2016    Pneumococcal Vaccines (Age 0-64) 01/10/2019    Mammogram 09/07/2021    Foot Exam 09/11/2021    Diabetes Urine Screening 11/16/2021    Lipid Panel 11/16/2021    Hemoglobin A1c 11/16/2021    Eye Exam 11/18/2021    Cervical Cancer Screening 01/06/2022    Low Dose Statin 02/01/2022       Future Appointments   Date Time Provider Department Center   5/9/2022 10:00 AM AWV NURSE, [facility] [place]   5/24/2022  8:30 AM [facility]   9/12/2022  7:30 AM [facility] [place] [facility] [place]        Follow up in about 3 months (around 5/1/2022), or if symptoms worsen or fail to improve.        Signature:  CHANDU Concepcion      [address] [place] [address]    Date of encounter: 2/1/22

## 2022-02-01 NOTE — ASSESSMENT & PLAN NOTE
Lab Results   Component Value Date    CHOL 164 11/16/2021    CHOL 174 08/17/2021    CHOL 185 05/17/2021    TRIG 99 11/16/2021    TRIG 61 08/17/2021    TRIG 69 05/17/2021    HDL 66 (H) 11/16/2021    HDL 75 (H) 08/17/2021    HDL 71 (H) 05/17/2021    LDLCALC 78 11/16/2021    LDLCALC 87 08/17/2021    LDLCALC 100 05/17/2021    CHOLHDL 2.5 11/16/2021    CHOLHDL 2.3 08/17/2021    CHOLHDL 2.6 05/17/2021    NONHDLCHOL 98 11/16/2021    NONHDLCHOL 99 08/17/2021    NONHDLCHOL 114 05/17/2021

## 2022-02-01 NOTE — ASSESSMENT & PLAN NOTE
Blood Pressure Trends are:  BP Readings from Last 6 Encounters:   02/01/22 123/84   01/06/22 130/80   12/02/21 113/84   11/16/21 116/84   09/15/21 118/74   08/17/21 101/68       Most Recent Chemistry:  CMP  Lab Results   Component Value Date     11/16/2021    K 4.3 11/16/2021     11/16/2021    CO2 27 11/16/2021     (H) 11/16/2021    BUN 22 (H) 11/16/2021    CREATININE 1.08 (H) 11/16/2021    CALCIUM 9.4 11/16/2021    PROT 7.7 11/16/2021    ALBUMIN 3.2 (L) 11/16/2021    BILITOT 0.1 11/16/2021    ALKPHOS 122 (H) 11/16/2021    AST 14 (L) 11/16/2021    ALT 25 11/16/2021    ANIONGAP 12 11/16/2021    ESTGFRAFRICA 68 11/16/2021      Blood Pressure Trends are:  BP Readings from Last 6 Encounters:   02/01/22 123/84   01/06/22 130/80   12/02/21 113/84   11/16/21 116/84   09/15/21 118/74   08/17/21 101/68       Most Recent Chemistry:  CMP  Lab Results   Component Value Date     11/16/2021    K 4.3 11/16/2021     11/16/2021    CO2 27 11/16/2021     (H) 11/16/2021    BUN 22 (H) 11/16/2021    CREATININE 1.08 (H) 11/16/2021    CALCIUM 9.4 11/16/2021    PROT 7.7 11/16/2021    ALBUMIN 3.2 (L) 11/16/2021    BILITOT 0.1 11/16/2021    ALKPHOS 122 (H) 11/16/2021    AST 14 (L) 11/16/2021    ALT 25 11/16/2021    ANIONGAP 12 11/16/2021    ESTGFRAFRICA 68 11/16/2021

## 2022-02-02 LAB — HCV AB SER QL: NORMAL

## 2022-02-07 ENCOUNTER — OFFICE VISIT (OUTPATIENT)
Dept: FAMILY MEDICINE | Facility: CLINIC | Age: 57
End: 2022-02-07
Payer: MEDICARE

## 2022-02-07 VITALS
HEIGHT: 61 IN | WEIGHT: 210 LBS | DIASTOLIC BLOOD PRESSURE: 80 MMHG | RESPIRATION RATE: 18 BRPM | SYSTOLIC BLOOD PRESSURE: 112 MMHG | HEART RATE: 97 BPM | BODY MASS INDEX: 39.65 KG/M2 | TEMPERATURE: 98 F

## 2022-02-07 DIAGNOSIS — E11.65 TYPE 2 DIABETES MELLITUS WITH HYPERGLYCEMIA, WITHOUT LONG-TERM CURRENT USE OF INSULIN: ICD-10-CM

## 2022-02-07 DIAGNOSIS — F31.9 BIPOLAR DEPRESSION: Primary | ICD-10-CM

## 2022-02-07 PROCEDURE — 99213 OFFICE O/P EST LOW 20 MIN: CPT | Mod: ,,, | Performed by: NURSE PRACTITIONER

## 2022-02-07 PROCEDURE — 99213 PR OFFICE/OUTPT VISIT, EST, LEVL III, 20-29 MIN: ICD-10-PCS | Mod: ,,, | Performed by: NURSE PRACTITIONER

## 2022-02-07 RX ORDER — SEMAGLUTIDE 1.34 MG/ML
1 INJECTION, SOLUTION SUBCUTANEOUS
Qty: 3 PEN | Refills: 1 | Status: SHIPPED | OUTPATIENT
Start: 2022-02-07 | End: 2022-08-10

## 2022-02-07 RX ORDER — CARIPRAZINE 1.5 MG/1
1.5 CAPSULE, GELATIN COATED ORAL DAILY
Qty: 90 CAPSULE | Refills: 1 | Status: SHIPPED | OUTPATIENT
Start: 2022-02-07 | End: 2022-03-08 | Stop reason: SDUPTHER

## 2022-02-07 NOTE — PROGRESS NOTES
CHANDU Concepcion   [address] [place] [address]     PATIENT NAME: Hailey Duval  : 1965  DATE: 22  MRN: [id number]      Billing Provider: CHANDU Concepcion  Level of Service: [place] OFFICE/OUTPT VISIT, EST, LEVL III, 20-29 MIN  Patient PCP Information     Provider PCP Type    CHANDU Concepcion General          Reason for Visit / Chief Complaint: Follow-up and Depression       Update PCP  Update Chief Complaint         History of Present Illness / Problem Focused Workflow     Hailey Duval presents to the clinic with Follow-up and Depression     HPI    Review of Systems     Review of Systems   Constitutional: Negative for chills, diaphoresis, fatigue and fever.   HENT: Negative for nasal congestion, dental problem, ear pain and postnasal drip.    Eyes: Negative for visual disturbance.   Respiratory: Negative for shortness of breath and wheezing.    Cardiovascular: Negative for chest pain and palpitations.   Gastrointestinal: Negative for abdominal distention and abdominal pain.   Endocrine: Negative for cold intolerance and heat intolerance.   Genitourinary: Negative for enuresis.   Musculoskeletal: Negative for neck stiffness.   Integumentary:  Negative for pallor and rash.   Neurological: Negative for dizziness, seizures, speech difficulty and weakness.   Psychiatric/Behavioral: Negative for agitation.        Medical / Social / Family History     Past Medical History:   Diagnosis Date    Anemia     Anemia, vitamin B12 deficiency     Chronic idiopathic constipation     chronic kidney disease     Depression     Diabetes mellitus, type 2     Dysphagia     GERD (gastroesophageal reflux disease)     Hyperlipidemia     Hypertension     Left ventricular hypertrophy     Nonrheumatic tricuspid (valve) insufficiency     Obese     Osteoarthritis of knee, unspecified     Ulcer of heel and midfoot        Past Surgical History:   Procedure Laterality Date     SECTION       "OTHER SURGICAL HISTORY      Foot and Toe surgery procedure screws in right foot        Social History  Ms.  reports that she has never smoked. She has never used smokeless tobacco. She reports current drug use. Drug: Marijuana. She reports that she does not drink alcohol.    Family History  Ms.'s family history includes Breast cancer in her sister; Diabetes in her mother; Glaucoma in her other; Heart disease in her other; Hyperlipidemia in her other; Hypertension in her father and mother.    Medications and Allergies     Medications  No outpatient medications have been marked as taking for the 2/7/22 encounter (Office Visit) with CHANDU Concepcion.       Allergies  Review of patient's allergies indicates:  No Known Allergies    Physical Examination   /80 (BP Location: Left arm, Patient Position: Sitting, BP Method: Medium (Automatic))   Pulse 97   Temp 97.9 °F (36.6 °C) (Temporal)   Resp 18   Ht 5' 1" (1.549 m)   Wt 95.3 kg (210 lb)   BMI 39.68 kg/m²   Physical Exam  Vitals and nursing note reviewed.   Constitutional:       Appearance: Normal appearance.   HENT:      Head: Normocephalic and atraumatic.      Right Ear: External ear normal.      Left Ear: External ear normal.      Nose: Nose normal.      Mouth/Throat:      Mouth: Mucous membranes are moist.      Pharynx: Oropharynx is clear.   Eyes:      Pupils: Pupils are equal, round, and reactive to light.   Cardiovascular:      Rate and Rhythm: Normal rate and regular rhythm.      Pulses: Normal pulses.      Heart sounds: Normal heart sounds. No murmur heard.  No gallop.    Pulmonary:      Effort: Pulmonary effort is normal.      Breath sounds: Normal breath sounds. No wheezing or rales.   Abdominal:      General: Abdomen is flat. Bowel sounds are normal. There is no distension.      Palpations: Abdomen is soft.      Tenderness: There is no abdominal tenderness.   Musculoskeletal:         General: Normal range of motion.      Cervical back: Normal " range of motion and neck supple.   Skin:     General: Skin is warm and dry.      Capillary Refill: Capillary refill takes less than 2 seconds.   Neurological:      General: No focal deficit present.      Mental Status: She is alert and oriented to person, place, and time.   Psychiatric:         Mood and Affect: Mood normal.         Behavior: Behavior normal.          Assessment and Plan (including Health Maintenance)      Problem List  Smart Sets  Document Outside HM   :    Plan:         Health Maintenance Due   Topic Date Due    Shingles Vaccine (2 of 3) 05/10/2021       Problem List Items Addressed This Visit        Psychiatric    Bipolar depression - Primary    Relevant Medications    cariprazine (VRAYLAR) 1.5 mg Cap       Endocrine    Type 2 diabetes mellitus with hyperglycemia, without long-term current use of insulin    Relevant Medications    semaglutide (OZEMPIC) 1 mg/dose (4 mg/3 mL)          Health Maintenance Topics with due status: Not Due       Topic Last Completion Date    Colorectal Cancer Screening 10/06/2016    Pneumococcal Vaccines (Age 0-64) 01/10/2019    Mammogram 09/07/2021    Foot Exam 09/11/2021    Eye Exam 11/18/2021    Cervical Cancer Screening 01/06/2022    Low Dose Statin 02/01/2022    Diabetes Urine Screening 02/01/2022    Lipid Panel 02/01/2022    Hemoglobin A1c 02/01/2022       Future Appointments   Date Time Provider Department Center   5/9/2022 10:00 AM AWV NURSE, [facility] [place]   5/24/2022  8:30 AM [facility]   9/12/2022  7:30 AM [facility] [place] [facility] [place]        Follow up in about 4 weeks (around 3/7/2022), or if symptoms worsen or fail to improve.        Signature:  Valery Whitfield, CHANDU      [address] [place] [address]    Date of encounter: 2/7/22

## 2022-03-08 ENCOUNTER — OFFICE VISIT (OUTPATIENT)
Dept: FAMILY MEDICINE | Facility: CLINIC | Age: 57
End: 2022-03-08
Payer: MEDICARE

## 2022-03-08 VITALS
HEART RATE: 91 BPM | WEIGHT: 204 LBS | RESPIRATION RATE: 18 BRPM | SYSTOLIC BLOOD PRESSURE: 109 MMHG | DIASTOLIC BLOOD PRESSURE: 77 MMHG | BODY MASS INDEX: 38.55 KG/M2

## 2022-03-08 DIAGNOSIS — E11.65 TYPE 2 DIABETES MELLITUS WITH HYPERGLYCEMIA, WITHOUT LONG-TERM CURRENT USE OF INSULIN: Primary | ICD-10-CM

## 2022-03-08 DIAGNOSIS — M54.9 BACK PAIN, UNSPECIFIED BACK LOCATION, UNSPECIFIED BACK PAIN LATERALITY, UNSPECIFIED CHRONICITY: ICD-10-CM

## 2022-03-08 DIAGNOSIS — F31.9 BIPOLAR DEPRESSION: ICD-10-CM

## 2022-03-08 PROCEDURE — 1159F MED LIST DOCD IN RCRD: CPT | Mod: ,,, | Performed by: NURSE PRACTITIONER

## 2022-03-08 PROCEDURE — 3008F BODY MASS INDEX DOCD: CPT | Mod: ,,, | Performed by: NURSE PRACTITIONER

## 2022-03-08 PROCEDURE — 3066F PR DOCUMENTATION OF TREATMENT FOR NEPHROPATHY: ICD-10-PCS | Mod: ,,, | Performed by: NURSE PRACTITIONER

## 2022-03-08 PROCEDURE — 3078F DIAST BP <80 MM HG: CPT | Mod: ,,, | Performed by: NURSE PRACTITIONER

## 2022-03-08 PROCEDURE — 96372 PR INJECTION,THERAP/PROPH/DIAG2ST, IM OR SUBCUT: ICD-10-PCS | Mod: ,,, | Performed by: NURSE PRACTITIONER

## 2022-03-08 PROCEDURE — 3008F PR BODY MASS INDEX (BMI) DOCUMENTED: ICD-10-PCS | Mod: ,,, | Performed by: NURSE PRACTITIONER

## 2022-03-08 PROCEDURE — 96372 THER/PROPH/DIAG INJ SC/IM: CPT | Mod: ,,, | Performed by: NURSE PRACTITIONER

## 2022-03-08 PROCEDURE — 3051F PR MOST RECENT HEMOGLOBIN A1C LEVEL 7.0 - < 8.0%: ICD-10-PCS | Mod: ,,, | Performed by: NURSE PRACTITIONER

## 2022-03-08 PROCEDURE — 1160F PR REVIEW ALL MEDS BY PRESCRIBER/CLIN PHARMACIST DOCUMENTED: ICD-10-PCS | Mod: ,,, | Performed by: NURSE PRACTITIONER

## 2022-03-08 PROCEDURE — 3066F NEPHROPATHY DOC TX: CPT | Mod: ,,, | Performed by: NURSE PRACTITIONER

## 2022-03-08 PROCEDURE — 3074F PR MOST RECENT SYSTOLIC BLOOD PRESSURE < 130 MM HG: ICD-10-PCS | Mod: ,,, | Performed by: NURSE PRACTITIONER

## 2022-03-08 PROCEDURE — 3051F HG A1C>EQUAL 7.0%<8.0%: CPT | Mod: ,,, | Performed by: NURSE PRACTITIONER

## 2022-03-08 PROCEDURE — 3061F PR NEG MICROALBUMINURIA RESULT DOCUMENTED/REVIEW: ICD-10-PCS | Mod: ,,, | Performed by: NURSE PRACTITIONER

## 2022-03-08 PROCEDURE — 1160F RVW MEDS BY RX/DR IN RCRD: CPT | Mod: ,,, | Performed by: NURSE PRACTITIONER

## 2022-03-08 PROCEDURE — 99213 PR OFFICE/OUTPT VISIT, EST, LEVL III, 20-29 MIN: ICD-10-PCS | Mod: 25,,, | Performed by: NURSE PRACTITIONER

## 2022-03-08 PROCEDURE — 3078F PR MOST RECENT DIASTOLIC BLOOD PRESSURE < 80 MM HG: ICD-10-PCS | Mod: ,,, | Performed by: NURSE PRACTITIONER

## 2022-03-08 PROCEDURE — 3074F SYST BP LT 130 MM HG: CPT | Mod: ,,, | Performed by: NURSE PRACTITIONER

## 2022-03-08 PROCEDURE — 99213 OFFICE O/P EST LOW 20 MIN: CPT | Mod: 25,,, | Performed by: NURSE PRACTITIONER

## 2022-03-08 PROCEDURE — 3061F NEG MICROALBUMINURIA REV: CPT | Mod: ,,, | Performed by: NURSE PRACTITIONER

## 2022-03-08 PROCEDURE — 1159F PR MEDICATION LIST DOCUMENTED IN MEDICAL RECORD: ICD-10-PCS | Mod: ,,, | Performed by: NURSE PRACTITIONER

## 2022-03-08 RX ORDER — CARIPRAZINE 1.5 MG/1
1.5 CAPSULE, GELATIN COATED ORAL DAILY
Qty: 90 CAPSULE | Refills: 1 | Status: SHIPPED | OUTPATIENT
Start: 2022-03-08 | End: 2022-05-04 | Stop reason: SDUPTHER

## 2022-03-08 RX ORDER — KETOROLAC TROMETHAMINE 30 MG/ML
60 INJECTION, SOLUTION INTRAMUSCULAR; INTRAVENOUS
Status: COMPLETED | OUTPATIENT
Start: 2022-03-08 | End: 2022-03-08

## 2022-03-08 RX ORDER — TRAZODONE HYDROCHLORIDE 100 MG/1
100 TABLET ORAL NIGHTLY
Qty: 30 TABLET | Refills: 11 | Status: SHIPPED | OUTPATIENT
Start: 2022-03-08 | End: 2022-04-12

## 2022-03-08 RX ORDER — METHYLPREDNISOLONE ACETATE 80 MG/ML
40 INJECTION, SUSPENSION INTRA-ARTICULAR; INTRALESIONAL; INTRAMUSCULAR; SOFT TISSUE ONCE
Status: COMPLETED | OUTPATIENT
Start: 2022-03-08 | End: 2022-03-08

## 2022-03-08 RX ORDER — DEXAMETHASONE SODIUM PHOSPHATE 4 MG/ML
4 INJECTION, SOLUTION INTRA-ARTICULAR; INTRALESIONAL; INTRAMUSCULAR; INTRAVENOUS; SOFT TISSUE ONCE
Status: COMPLETED | OUTPATIENT
Start: 2022-03-08 | End: 2022-03-08

## 2022-03-08 RX ADMIN — KETOROLAC TROMETHAMINE 60 MG: 30 INJECTION, SOLUTION INTRAMUSCULAR; INTRAVENOUS at 01:03

## 2022-03-08 RX ADMIN — METHYLPREDNISOLONE ACETATE 40 MG: 80 INJECTION, SUSPENSION INTRA-ARTICULAR; INTRALESIONAL; INTRAMUSCULAR; SOFT TISSUE at 01:03

## 2022-03-08 RX ADMIN — DEXAMETHASONE SODIUM PHOSPHATE 4 MG: 4 INJECTION, SOLUTION INTRA-ARTICULAR; INTRALESIONAL; INTRAMUSCULAR; INTRAVENOUS; SOFT TISSUE at 01:03

## 2022-03-08 NOTE — PROGRESS NOTES
CHANDU Concepcion   [address] [place] [address]     PATIENT NAME: Hailey Duval  : 1965  DATE: 3/8/22  MRN: [id number]      Billing Provider: CHANDU Concepcion  Level of Service: [place] OFFICE/OUTPT VISIT, EST, LEVL III, 20-29 MIN  Patient PCP Information     Provider PCP Type    CHANDU Concepcion General          Reason for Visit / Chief Complaint: Follow-up       Update PCP  Update Chief Complaint         History of Present Illness / Problem Focused Workflow     Hailey Duval presents to the clinic with Follow-up     HPI    Review of Systems     Review of Systems   Constitutional: Negative for chills, diaphoresis, fatigue and fever.   HENT: Negative for nasal congestion, dental problem, ear pain and postnasal drip.    Eyes: Negative for visual disturbance.   Respiratory: Negative for shortness of breath and wheezing.    Cardiovascular: Negative for chest pain and palpitations.   Gastrointestinal: Negative for abdominal distention and abdominal pain.   Endocrine: Negative for cold intolerance and heat intolerance.   Genitourinary: Negative for enuresis.   Musculoskeletal: Negative for neck stiffness.   Integumentary:  Negative for pallor and rash.   Neurological: Negative for dizziness, seizures, speech difficulty and weakness.   Psychiatric/Behavioral: Negative for agitation.        Medical / Social / Family History     Past Medical History:   Diagnosis Date    Anemia     Anemia, vitamin B12 deficiency     Chronic idiopathic constipation     chronic kidney disease     Depression     Diabetes mellitus, type 2     Dysphagia     GERD (gastroesophageal reflux disease)     Hyperlipidemia     Hypertension     Left ventricular hypertrophy     Nonrheumatic tricuspid (valve) insufficiency     Obese     Osteoarthritis of knee, unspecified     Ulcer of heel and midfoot        Past Surgical History:   Procedure Laterality Date     SECTION      OTHER SURGICAL HISTORY       Foot and Toe surgery procedure screws in right foot        Social History  Ms.  reports that she has never smoked. She has never used smokeless tobacco. She reports current drug use. Drug: Marijuana. She reports that she does not drink alcohol.    Family History  Ms.'s family history includes Breast cancer in her sister; Diabetes in her mother; Glaucoma in her other; Heart disease in her other; Hyperlipidemia in her other; Hypertension in her father and mother.    Medications and Allergies     Medications  Outpatient Medications Marked as Taking for the 3/8/22 encounter (Office Visit) with CHANDU Concepcion   Medication Sig Dispense Refill    aspirin (ECOTRIN) 81 MG EC tablet Take 1 tablet (81 mg total) by mouth once daily. 90 tablet 0    blood sugar diagnostic Strp by Misc.(Non-Drug; Combo Route) route.      ergocalciferol (ERGOCALCIFEROL) 50,000 unit Cap Take 1 capsule (50,000 Units total) by mouth every 7 days. 90 capsule 0    esomeprazole (NEXIUM) 40 MG capsule Take 1 capsule (40 mg total) by mouth once daily. 90 capsule 1    glipiZIDE (GLUCOTROL) 5 MG tablet Take 1 tablet (5 mg total) by mouth 2 (two) times daily with meals. 180 tablet 0    hydroCHLOROthiazide (HYDRODIURIL) 25 MG tablet Take 1 tablet (25 mg total) by mouth once daily. 90 tablet 0    HYDROcodone-acetaminophen (NORCO)  mg per tablet Take 1 tablet by mouth daily as needed.       lovastatin (MEVACOR) 40 MG tablet Take 1 tablet (40 mg total) by mouth every evening. 90 tablet 0    semaglutide (OZEMPIC) 1 mg/dose (4 mg/3 mL) Inject 1 mg into the skin every 7 days. 3 pen 1    zinc gluconate 50 mg tablet Take 50 mg by mouth once daily.      [DISCONTINUED] cariprazine (VRAYLAR) 1.5 mg Cap Take 1 capsule (1.5 mg total) by mouth once daily. 90 capsule 1    [DISCONTINUED] traZODone (DESYREL) 100 MG tablet Take 1 tablet (100 mg total) by mouth every evening. 30 tablet 11     Current Facility-Administered Medications for the 3/8/22  encounter (Office Visit) with CHANDU Concepcion   Medication Dose Route Frequency Provider Last Rate Last Admin    dexamethasone injection 4 mg  4 mg Intramuscular Once Valery JACINTO Whitfield, FNP        ketorolac injection 60 mg  60 mg Intramuscular 1 time in Clinic/HOD Valery Whitfield FNFRANSICO        methylPREDNISolone acetate injection 40 mg  40 mg Intramuscular Once CHANDU Concepcion           Allergies  Review of patient's allergies indicates:  No Known Allergies    Physical Examination   /77 (BP Location: Left arm, Patient Position: Sitting, BP Method: Large (Automatic))   Pulse 91   Resp 18   Wt 92.5 kg (204 lb)   BMI 38.55 kg/m²   Physical Exam  Vitals and nursing note reviewed.   Constitutional:       Appearance: Normal appearance.   HENT:      Head: Normocephalic and atraumatic.      Right Ear: External ear normal.      Left Ear: External ear normal.      Nose: Nose normal.      Mouth/Throat:      Mouth: Mucous membranes are moist.      Pharynx: Oropharynx is clear.   Eyes:      Pupils: Pupils are equal, round, and reactive to light.   Cardiovascular:      Rate and Rhythm: Normal rate and regular rhythm.      Pulses: Normal pulses.      Heart sounds: Normal heart sounds. No murmur heard.    No gallop.   Pulmonary:      Effort: Pulmonary effort is normal.      Breath sounds: Normal breath sounds. No wheezing or rales.   Abdominal:      General: Abdomen is flat. Bowel sounds are normal. There is no distension.      Palpations: Abdomen is soft.      Tenderness: There is no abdominal tenderness.   Musculoskeletal:         General: Normal range of motion.      Cervical back: Normal range of motion and neck supple.   Skin:     General: Skin is warm and dry.      Capillary Refill: Capillary refill takes less than 2 seconds.   Neurological:      General: No focal deficit present.      Mental Status: She is alert and oriented to person, place, and time.   Psychiatric:         Mood and Affect: Mood normal.          Behavior: Behavior normal.          Assessment and Plan (including Health Maintenance)      Problem List  Smart Sets  Document Outside HM   :    Plan:         Health Maintenance Due   Topic Date Due    Shingles Vaccine (2 of 3) 05/10/2021       Problem List Items Addressed This Visit        Psychiatric    Bipolar depression    Relevant Medications    cariprazine (VRAYLAR) 1.5 mg Cap       Endocrine    Type 2 diabetes mellitus with hyperglycemia, without long-term current use of insulin - Primary    BMI 45.0-49.9, adult      Other Visit Diagnoses     Back pain, unspecified back location, unspecified back pain laterality, unspecified chronicity        Relevant Medications    dexamethasone injection 4 mg (Start on 3/8/2022  2:30 PM)    ketorolac injection 60 mg (Start on 3/8/2022  1:30 PM)    methylPREDNISolone acetate injection 40 mg (Start on 3/8/2022  2:30 PM)          Health Maintenance Topics with due status: Not Due       Topic Last Completion Date    Colorectal Cancer Screening 10/06/2016    Pneumococcal Vaccines (Age 0-64) 01/10/2019    Mammogram 09/07/2021    Foot Exam 09/11/2021    Cervical Cancer Screening 09/15/2021    Eye Exam 11/18/2021    Low Dose Statin 02/01/2022    Diabetes Urine Screening 02/01/2022    Lipid Panel 02/01/2022    Hemoglobin A1c 02/01/2022       Future Appointments   Date Time Provider Department Center   5/9/2022 10:00 AM AW[facility] NURSE, [facility] [place]   5/24/2022  8:30 AM [facility]   9/12/2022  7:30 AM [facility] [place] [facility]        Follow up in about 3 months (around 6/8/2022), or if symptoms worsen or fail to improve.        Signature:  Valery Whitfield, CHARLIEP      [address] [place] [address]    Date of encounter: 3/8/22

## 2022-04-11 DIAGNOSIS — E11.65 TYPE 2 DIABETES MELLITUS WITH HYPERGLYCEMIA, WITHOUT LONG-TERM CURRENT USE OF INSULIN: ICD-10-CM

## 2022-04-12 RX ORDER — METFORMIN HYDROCHLORIDE 1000 MG/1
TABLET ORAL
Qty: 60 TABLET | Refills: 0 | Status: SHIPPED | OUTPATIENT
Start: 2022-04-12 | End: 2022-05-04 | Stop reason: SDUPTHER

## 2022-04-12 RX ORDER — GLIPIZIDE 5 MG/1
5 TABLET ORAL 2 TIMES DAILY WITH MEALS
Qty: 180 TABLET | Refills: 0 | Status: SHIPPED | OUTPATIENT
Start: 2022-04-12 | End: 2022-05-04

## 2022-04-14 RX ORDER — GLIPIZIDE 5 MG/1
TABLET ORAL
Qty: 60 TABLET | Refills: 1 | Status: SHIPPED | OUTPATIENT
Start: 2022-04-14 | End: 2022-05-04

## 2022-05-04 ENCOUNTER — OFFICE VISIT (OUTPATIENT)
Dept: FAMILY MEDICINE | Facility: CLINIC | Age: 57
End: 2022-05-04
Payer: MEDICARE

## 2022-05-04 VITALS
HEIGHT: 61 IN | TEMPERATURE: 98 F | RESPIRATION RATE: 18 BRPM | WEIGHT: 202 LBS | DIASTOLIC BLOOD PRESSURE: 74 MMHG | BODY MASS INDEX: 38.14 KG/M2 | SYSTOLIC BLOOD PRESSURE: 112 MMHG | HEART RATE: 81 BPM

## 2022-05-04 DIAGNOSIS — E78.5 HYPERLIPIDEMIA ASSOCIATED WITH TYPE 2 DIABETES MELLITUS: ICD-10-CM

## 2022-05-04 DIAGNOSIS — E11.69 HYPERLIPIDEMIA ASSOCIATED WITH TYPE 2 DIABETES MELLITUS: ICD-10-CM

## 2022-05-04 DIAGNOSIS — I10 ESSENTIAL HYPERTENSION: ICD-10-CM

## 2022-05-04 DIAGNOSIS — E55.9 VITAMIN D DEFICIENCY: ICD-10-CM

## 2022-05-04 DIAGNOSIS — R53.83 FATIGUE, UNSPECIFIED TYPE: ICD-10-CM

## 2022-05-04 DIAGNOSIS — K63.5 POLYP OF COLON, UNSPECIFIED PART OF COLON, UNSPECIFIED TYPE: ICD-10-CM

## 2022-05-04 DIAGNOSIS — E11.65 TYPE 2 DIABETES MELLITUS WITH HYPERGLYCEMIA, WITHOUT LONG-TERM CURRENT USE OF INSULIN: ICD-10-CM

## 2022-05-04 DIAGNOSIS — F31.9 BIPOLAR DEPRESSION: ICD-10-CM

## 2022-05-04 DIAGNOSIS — K21.9 GASTROESOPHAGEAL REFLUX DISEASE, UNSPECIFIED WHETHER ESOPHAGITIS PRESENT: ICD-10-CM

## 2022-05-04 LAB
ALBUMIN SERPL BCP-MCNC: 3.2 G/DL (ref 3.5–5)
ALBUMIN/GLOB SERPL: 0.9 {RATIO}
ALP SERPL-CCNC: 92 U/L (ref 46–118)
ALT SERPL W P-5'-P-CCNC: 24 U/L (ref 13–56)
ANION GAP SERPL CALCULATED.3IONS-SCNC: 13 MMOL/L (ref 7–16)
AST SERPL W P-5'-P-CCNC: 9 U/L (ref 15–37)
BASOPHILS # BLD AUTO: 0.01 K/UL (ref 0–0.2)
BASOPHILS NFR BLD AUTO: 0.1 % (ref 0–1)
BILIRUB SERPL-MCNC: 0.2 MG/DL (ref 0–1.2)
BUN SERPL-MCNC: 17 MG/DL (ref 7–18)
BUN/CREAT SERPL: 17 (ref 6–20)
CALCIUM SERPL-MCNC: 9.1 MG/DL (ref 8.5–10.1)
CHLORIDE SERPL-SCNC: 102 MMOL/L (ref 98–107)
CHOLEST SERPL-MCNC: 166 MG/DL (ref 0–200)
CHOLEST/HDLC SERPL: 2.6 {RATIO}
CO2 SERPL-SCNC: 27 MMOL/L (ref 21–32)
CREAT SERPL-MCNC: 0.99 MG/DL (ref 0.55–1.02)
CREAT UR-MCNC: 141 MG/DL (ref 28–219)
DIFFERENTIAL METHOD BLD: ABNORMAL
EOSINOPHIL # BLD AUTO: 0.11 K/UL (ref 0–0.5)
EOSINOPHIL NFR BLD AUTO: 1.4 % (ref 1–4)
ERYTHROCYTE [DISTWIDTH] IN BLOOD BY AUTOMATED COUNT: 16.9 % (ref 11.5–14.5)
EST. AVERAGE GLUCOSE BLD GHB EST-MCNC: 124 MG/DL
GLOBULIN SER-MCNC: 3.5 G/DL (ref 2–4)
GLUCOSE SERPL-MCNC: 126 MG/DL (ref 74–106)
HBA1C MFR BLD HPLC: 6.3 % (ref 4.5–6.6)
HCT VFR BLD AUTO: 38.4 % (ref 38–47)
HDLC SERPL-MCNC: 65 MG/DL (ref 40–60)
HGB BLD-MCNC: 11.6 G/DL (ref 12–16)
IMM GRANULOCYTES # BLD AUTO: 0.02 K/UL (ref 0–0.04)
IMM GRANULOCYTES NFR BLD: 0.3 % (ref 0–0.4)
LDLC SERPL CALC-MCNC: 84 MG/DL
LDLC/HDLC SERPL: 1.3 {RATIO}
LYMPHOCYTES # BLD AUTO: 2.78 K/UL (ref 1–4.8)
LYMPHOCYTES NFR BLD AUTO: 36 % (ref 27–41)
MCH RBC QN AUTO: 25.2 PG (ref 27–31)
MCHC RBC AUTO-ENTMCNC: 30.2 G/DL (ref 32–36)
MCV RBC AUTO: 83.5 FL (ref 80–96)
MICROALBUMIN UR-MCNC: 1.6 MG/DL (ref 0–2.8)
MICROALBUMIN/CREAT RATIO PNL UR: 11.3 MG/G (ref 0–30)
MONOCYTES # BLD AUTO: 0.5 K/UL (ref 0–0.8)
MONOCYTES NFR BLD AUTO: 6.5 % (ref 2–6)
MPC BLD CALC-MCNC: 11.6 FL (ref 9.4–12.4)
NEUTROPHILS # BLD AUTO: 4.31 K/UL (ref 1.8–7.7)
NEUTROPHILS NFR BLD AUTO: 55.7 % (ref 53–65)
NONHDLC SERPL-MCNC: 101 MG/DL
NRBC # BLD AUTO: 0 X10E3/UL
NRBC, AUTO (.00): 0 %
PLATELET # BLD AUTO: 301 K/UL (ref 150–400)
POTASSIUM SERPL-SCNC: 3.9 MMOL/L (ref 3.5–5.1)
PROT SERPL-MCNC: 6.7 G/DL (ref 6.4–8.2)
RBC # BLD AUTO: 4.6 M/UL (ref 4.2–5.4)
SODIUM SERPL-SCNC: 138 MMOL/L (ref 136–145)
TRIGL SERPL-MCNC: 84 MG/DL (ref 35–150)
VLDLC SERPL-MCNC: 17 MG/DL
WBC # BLD AUTO: 7.73 K/UL (ref 4.5–11)

## 2022-05-04 PROCEDURE — 3008F BODY MASS INDEX DOCD: CPT | Mod: ,,, | Performed by: NURSE PRACTITIONER

## 2022-05-04 PROCEDURE — 3078F PR MOST RECENT DIASTOLIC BLOOD PRESSURE < 80 MM HG: ICD-10-PCS | Mod: ,,, | Performed by: NURSE PRACTITIONER

## 2022-05-04 PROCEDURE — 1159F PR MEDICATION LIST DOCUMENTED IN MEDICAL RECORD: ICD-10-PCS | Mod: ,,, | Performed by: NURSE PRACTITIONER

## 2022-05-04 PROCEDURE — 3078F DIAST BP <80 MM HG: CPT | Mod: ,,, | Performed by: NURSE PRACTITIONER

## 2022-05-04 PROCEDURE — 3074F PR MOST RECENT SYSTOLIC BLOOD PRESSURE < 130 MM HG: ICD-10-PCS | Mod: ,,, | Performed by: NURSE PRACTITIONER

## 2022-05-04 PROCEDURE — 82570 ASSAY OF URINE CREATININE: CPT | Mod: ,,, | Performed by: CLINICAL MEDICAL LABORATORY

## 2022-05-04 PROCEDURE — 80061 LIPID PANEL: CPT | Mod: ,,, | Performed by: CLINICAL MEDICAL LABORATORY

## 2022-05-04 PROCEDURE — 85025 CBC WITH DIFFERENTIAL: ICD-10-PCS | Mod: ,,, | Performed by: CLINICAL MEDICAL LABORATORY

## 2022-05-04 PROCEDURE — 83036 HEMOGLOBIN GLYCOSYLATED A1C: CPT | Mod: ,,, | Performed by: CLINICAL MEDICAL LABORATORY

## 2022-05-04 PROCEDURE — 3066F PR DOCUMENTATION OF TREATMENT FOR NEPHROPATHY: ICD-10-PCS | Mod: ,,, | Performed by: NURSE PRACTITIONER

## 2022-05-04 PROCEDURE — 1160F RVW MEDS BY RX/DR IN RCRD: CPT | Mod: ,,, | Performed by: NURSE PRACTITIONER

## 2022-05-04 PROCEDURE — 3051F PR MOST RECENT HEMOGLOBIN A1C LEVEL 7.0 - < 8.0%: ICD-10-PCS | Mod: ,,, | Performed by: NURSE PRACTITIONER

## 2022-05-04 PROCEDURE — 82043 UR ALBUMIN QUANTITATIVE: CPT | Mod: ,,, | Performed by: CLINICAL MEDICAL LABORATORY

## 2022-05-04 PROCEDURE — 83036 HEMOGLOBIN A1C: ICD-10-PCS | Mod: ,,, | Performed by: CLINICAL MEDICAL LABORATORY

## 2022-05-04 PROCEDURE — 82570 MICROALBUMIN / CREATININE RATIO URINE: ICD-10-PCS | Mod: ,,, | Performed by: CLINICAL MEDICAL LABORATORY

## 2022-05-04 PROCEDURE — 1159F MED LIST DOCD IN RCRD: CPT | Mod: ,,, | Performed by: NURSE PRACTITIONER

## 2022-05-04 PROCEDURE — 3061F NEG MICROALBUMINURIA REV: CPT | Mod: ,,, | Performed by: NURSE PRACTITIONER

## 2022-05-04 PROCEDURE — 3061F PR NEG MICROALBUMINURIA RESULT DOCUMENTED/REVIEW: ICD-10-PCS | Mod: ,,, | Performed by: NURSE PRACTITIONER

## 2022-05-04 PROCEDURE — 3008F PR BODY MASS INDEX (BMI) DOCUMENTED: ICD-10-PCS | Mod: ,,, | Performed by: NURSE PRACTITIONER

## 2022-05-04 PROCEDURE — 85025 COMPLETE CBC W/AUTO DIFF WBC: CPT | Mod: ,,, | Performed by: CLINICAL MEDICAL LABORATORY

## 2022-05-04 PROCEDURE — 3051F HG A1C>EQUAL 7.0%<8.0%: CPT | Mod: ,,, | Performed by: NURSE PRACTITIONER

## 2022-05-04 PROCEDURE — 80061 LIPID PANEL: ICD-10-PCS | Mod: ,,, | Performed by: CLINICAL MEDICAL LABORATORY

## 2022-05-04 PROCEDURE — 80053 COMPREHENSIVE METABOLIC PANEL: ICD-10-PCS | Mod: ,,, | Performed by: CLINICAL MEDICAL LABORATORY

## 2022-05-04 PROCEDURE — 99214 OFFICE O/P EST MOD 30 MIN: CPT | Mod: ,,, | Performed by: NURSE PRACTITIONER

## 2022-05-04 PROCEDURE — 82043 MICROALBUMIN / CREATININE RATIO URINE: ICD-10-PCS | Mod: ,,, | Performed by: CLINICAL MEDICAL LABORATORY

## 2022-05-04 PROCEDURE — 3074F SYST BP LT 130 MM HG: CPT | Mod: ,,, | Performed by: NURSE PRACTITIONER

## 2022-05-04 PROCEDURE — 99214 PR OFFICE/OUTPT VISIT, EST, LEVL IV, 30-39 MIN: ICD-10-PCS | Mod: ,,, | Performed by: NURSE PRACTITIONER

## 2022-05-04 PROCEDURE — 1160F PR REVIEW ALL MEDS BY PRESCRIBER/CLIN PHARMACIST DOCUMENTED: ICD-10-PCS | Mod: ,,, | Performed by: NURSE PRACTITIONER

## 2022-05-04 PROCEDURE — 3066F NEPHROPATHY DOC TX: CPT | Mod: ,,, | Performed by: NURSE PRACTITIONER

## 2022-05-04 PROCEDURE — 80053 COMPREHEN METABOLIC PANEL: CPT | Mod: ,,, | Performed by: CLINICAL MEDICAL LABORATORY

## 2022-05-04 RX ORDER — HYDROCHLOROTHIAZIDE 25 MG/1
25 TABLET ORAL DAILY
Qty: 90 TABLET | Refills: 0 | Status: SHIPPED | OUTPATIENT
Start: 2022-05-04 | End: 2022-08-10 | Stop reason: SDUPTHER

## 2022-05-04 RX ORDER — TRAZODONE HYDROCHLORIDE 100 MG/1
100 TABLET ORAL NIGHTLY PRN
Qty: 90 TABLET | Refills: 0 | Status: SHIPPED | OUTPATIENT
Start: 2022-05-04 | End: 2022-08-10 | Stop reason: SDUPTHER

## 2022-05-04 RX ORDER — LANCETS 33 GAUGE
90 EACH MISCELLANEOUS 3 TIMES DAILY
Qty: 90 EACH | Refills: 11 | Status: SHIPPED | OUTPATIENT
Start: 2022-05-04 | End: 2022-08-10 | Stop reason: SDUPTHER

## 2022-05-04 RX ORDER — METFORMIN HYDROCHLORIDE 1000 MG/1
1000 TABLET ORAL 2 TIMES DAILY
Qty: 60 TABLET | Refills: 0 | Status: SHIPPED | OUTPATIENT
Start: 2022-05-04 | End: 2022-08-10 | Stop reason: SDUPTHER

## 2022-05-04 RX ORDER — GLIPIZIDE 5 MG/1
5 TABLET ORAL 2 TIMES DAILY WITH MEALS
Qty: 180 TABLET | Refills: 0 | Status: SHIPPED | OUTPATIENT
Start: 2022-05-04 | End: 2022-08-10

## 2022-05-04 RX ORDER — ESOMEPRAZOLE MAGNESIUM 40 MG/1
40 CAPSULE, DELAYED RELEASE ORAL DAILY
Qty: 90 CAPSULE | Refills: 1 | Status: SHIPPED | OUTPATIENT
Start: 2022-05-04 | End: 2022-08-10 | Stop reason: SDUPTHER

## 2022-05-04 RX ORDER — ERGOCALCIFEROL 1.25 MG/1
50000 CAPSULE ORAL
Qty: 90 CAPSULE | Refills: 0 | Status: SHIPPED | OUTPATIENT
Start: 2022-05-04 | End: 2022-08-10 | Stop reason: SDUPTHER

## 2022-05-04 RX ORDER — CARIPRAZINE 1.5 MG/1
1.5 CAPSULE, GELATIN COATED ORAL DAILY
Qty: 90 CAPSULE | Refills: 1 | Status: SHIPPED | OUTPATIENT
Start: 2022-05-04 | End: 2022-08-10 | Stop reason: SDUPTHER

## 2022-05-04 RX ORDER — LOVASTATIN 40 MG/1
40 TABLET ORAL NIGHTLY
Qty: 90 TABLET | Refills: 0 | Status: SHIPPED | OUTPATIENT
Start: 2022-05-04 | End: 2022-08-10 | Stop reason: SDUPTHER

## 2022-05-04 RX ORDER — ASPIRIN 81 MG/1
81 TABLET ORAL DAILY
Qty: 90 TABLET | Refills: 0 | Status: SHIPPED | OUTPATIENT
Start: 2022-05-04 | End: 2022-08-10 | Stop reason: SDUPTHER

## 2022-05-04 NOTE — PROGRESS NOTES
CHANDU Concepcion   [address] [place] [address]     PATIENT NAME: Hailey Duval  : 1965  DATE: 22  MRN: [id number]      Billing Provider: CHANDU Concepcion  Level of Service: OR OFFICE/OUTPT VISIT, EST, LEVL IV, 30-39 MIN  Patient PCP Information     Provider PCP Type    CHANDU Concepcion General          Reason for Visit / Chief Complaint: Follow-up (3 month follow up)       Update PCP  Update Chief Complaint         History of Present Illness / Problem Focused Workflow     Hailey Duval presents to the clinic with Follow-up (3 month follow up)     HPI    Review of Systems     Review of Systems   Constitutional: Negative for chills, diaphoresis, fatigue and fever.   HENT: Negative for nasal congestion, dental problem, ear pain and postnasal drip.    Eyes: Negative for visual disturbance.   Respiratory: Negative for shortness of breath and wheezing.    Cardiovascular: Negative for chest pain and palpitations.   Gastrointestinal: Negative for abdominal distention and abdominal pain.   Endocrine: Negative for cold intolerance and heat intolerance.   Genitourinary: Negative for enuresis.   Musculoskeletal: Negative for neck stiffness.   Integumentary:  Negative for pallor and rash.   Neurological: Negative for dizziness, seizures, speech difficulty and weakness.   Psychiatric/Behavioral: Negative for agitation.        Medical / Social / Family History     Past Medical History:   Diagnosis Date    Anemia     Anemia, vitamin B12 deficiency     Chronic idiopathic constipation     chronic kidney disease     Depression     Diabetes mellitus, type 2     Dysphagia     GERD (gastroesophageal reflux disease)     Hyperlipidemia     Hypertension     Left ventricular hypertrophy     Nonrheumatic tricuspid (valve) insufficiency     Obese     Osteoarthritis of knee, unspecified     Ulcer of heel and midfoot        Past Surgical History:   Procedure Laterality Date      "SECTION      OTHER SURGICAL HISTORY      Foot and Toe surgery procedure screws in right foot        Social History  Ms.  reports that she has never smoked. She has never used smokeless tobacco. She reports current drug use. Drug: Marijuana. She reports that she does not drink alcohol.    Family History  Ms.'s family history includes Breast cancer in her sister; Diabetes in her mother; Glaucoma in her other; Heart disease in her other; Hyperlipidemia in her other; Hypertension in her father and mother.    PHQ9 5/4/2022   Total Score 0           Medications and Allergies     Medications  No outpatient medications have been marked as taking for the 5/4/22 encounter (Office Visit) with CHANDU Concepcion.       Allergies  Review of patient's allergies indicates:  No Known Allergies    Physical Examination   /74 (BP Location: Left arm, Patient Position: Sitting, BP Method: Medium (Automatic))   Pulse 81   Temp 97.7 °F (36.5 °C) (Temporal)   Resp 18   Ht 5' 1" (1.549 m)   Wt 91.6 kg (202 lb)   BMI 38.17 kg/m²   Physical Exam  Vitals and nursing note reviewed.   Constitutional:       Appearance: Normal appearance.   HENT:      Head: Normocephalic and atraumatic.      Right Ear: External ear normal.      Left Ear: External ear normal.      Nose: Nose normal.      Mouth/Throat:      Mouth: Mucous membranes are moist.      Pharynx: Oropharynx is clear.   Eyes:      Pupils: Pupils are equal, round, and reactive to light.   Cardiovascular:      Rate and Rhythm: Normal rate and regular rhythm.      Pulses: Normal pulses.      Heart sounds: Normal heart sounds. No murmur heard.    No gallop.   Pulmonary:      Effort: Pulmonary effort is normal.      Breath sounds: Normal breath sounds. No wheezing or rales.   Abdominal:      General: Abdomen is flat. Bowel sounds are normal. There is no distension.      Palpations: Abdomen is soft.      Tenderness: There is no abdominal tenderness.   Musculoskeletal:         General: " Normal range of motion.      Cervical back: Normal range of motion and neck supple.   Skin:     General: Skin is warm and dry.      Capillary Refill: Capillary refill takes less than 2 seconds.   Neurological:      General: No focal deficit present.      Mental Status: She is alert and oriented to person, place, and time.   Psychiatric:         Mood and Affect: Mood normal.         Behavior: Behavior normal.          Assessment and Plan (including Health Maintenance)      Problem List  Smart Sets  Document Outside HM   :    Plan:         Health Maintenance Due   Topic Date Due    Shingles Vaccine (2 of 2) 05/10/2021    Colorectal Cancer Screening  10/06/2021    COVID-19 Vaccine (3 - Booster for Gurdeep series) 02/27/2022       Problem List Items Addressed This Visit        Psychiatric    Bipolar depression    Relevant Medications    cariprazine (VRAYLAR) 1.5 mg Cap       Cardiac/Vascular    Essential hypertension    Relevant Medications    aspirin (ECOTRIN) 81 MG EC tablet    hydroCHLOROthiazide (HYDRODIURIL) 25 MG tablet    Other Relevant Orders    Microalbumin/Creatinine Ratio, Urine    Hyperlipidemia associated with type 2 diabetes mellitus    Relevant Medications    lovastatin (MEVACOR) 40 MG tablet    metFORMIN (GLUCOPHAGE) 1000 MG tablet    glipiZIDE (GLUCOTROL) 5 MG tablet    semaglutide 2 mg/dose (8 mg/3 mL) PnIj    Other Relevant Orders    Lipid Panel       Endocrine    Type 2 diabetes mellitus with hyperglycemia, without long-term current use of insulin    Relevant Medications    metFORMIN (GLUCOPHAGE) 1000 MG tablet    glipiZIDE (GLUCOTROL) 5 MG tablet    semaglutide 2 mg/dose (8 mg/3 mL) PnIj    Other Relevant Orders    Comprehensive Metabolic Panel    CBC Auto Differential    Hemoglobin A1C    Vitamin D deficiency    Relevant Medications    ergocalciferol (ERGOCALCIFEROL) 50,000 unit Cap    BMI 38.0-38.9,adult       GI    Gastroesophageal reflux disease       Other    Fatigue      Other Visit  Diagnoses     Polyp of colon, unspecified part of colon, unspecified type        Relevant Orders    Ambulatory referral/consult to Gastroenterology          Health Maintenance Topics with due status: Not Due       Topic Last Completion Date    Mammogram 09/07/2021    Foot Exam 09/11/2021    Cervical Cancer Screening 09/15/2021    Eye Exam 11/18/2021    Diabetes Urine Screening 02/01/2022    Lipid Panel 02/01/2022    Hemoglobin A1c 02/01/2022    Low Dose Statin 03/08/2022       Future Appointments   Date Time Provider Department Center   5/9/2022 10:00 AM AWV NURSE, [facility]   5/24/2022  8:30 AM [facility]   9/12/2022  7:30 AM [facility] [facility]        Follow up in about 3 months (around 8/4/2022), or if symptoms worsen or fail to improve.        Signature:  CHARLIE ConcepcionP      [address] [place] [address]    Date of encounter: 5/4/22

## 2022-05-09 ENCOUNTER — OFFICE VISIT (OUTPATIENT)
Dept: FAMILY MEDICINE | Facility: CLINIC | Age: 57
End: 2022-05-09
Payer: MEDICARE

## 2022-05-09 VITALS
TEMPERATURE: 98 F | BODY MASS INDEX: 37.95 KG/M2 | OXYGEN SATURATION: 98 % | HEIGHT: 61 IN | DIASTOLIC BLOOD PRESSURE: 78 MMHG | RESPIRATION RATE: 20 BRPM | SYSTOLIC BLOOD PRESSURE: 122 MMHG | HEART RATE: 65 BPM | WEIGHT: 201 LBS

## 2022-05-09 DIAGNOSIS — E11.69 HYPERLIPIDEMIA ASSOCIATED WITH TYPE 2 DIABETES MELLITUS: ICD-10-CM

## 2022-05-09 DIAGNOSIS — K21.9 GASTROESOPHAGEAL REFLUX DISEASE, UNSPECIFIED WHETHER ESOPHAGITIS PRESENT: ICD-10-CM

## 2022-05-09 DIAGNOSIS — Z00.00 ENCOUNTER FOR PREVENTIVE HEALTH EXAMINATION: ICD-10-CM

## 2022-05-09 DIAGNOSIS — E11.9 TYPE 2 DIABETES MELLITUS WITHOUT COMPLICATION, WITHOUT LONG-TERM CURRENT USE OF INSULIN: Primary | ICD-10-CM

## 2022-05-09 DIAGNOSIS — E78.5 HYPERLIPIDEMIA ASSOCIATED WITH TYPE 2 DIABETES MELLITUS: ICD-10-CM

## 2022-05-09 DIAGNOSIS — I10 ESSENTIAL HYPERTENSION: ICD-10-CM

## 2022-05-09 DIAGNOSIS — F31.9 BIPOLAR DEPRESSION: ICD-10-CM

## 2022-05-09 DIAGNOSIS — E66.3 OVERWEIGHT: ICD-10-CM

## 2022-05-09 PROCEDURE — 1159F PR MEDICATION LIST DOCUMENTED IN MEDICAL RECORD: ICD-10-PCS | Mod: ,,, | Performed by: NURSE PRACTITIONER

## 2022-05-09 PROCEDURE — 3061F NEG MICROALBUMINURIA REV: CPT | Mod: ,,, | Performed by: NURSE PRACTITIONER

## 2022-05-09 PROCEDURE — 1160F PR REVIEW ALL MEDS BY PRESCRIBER/CLIN PHARMACIST DOCUMENTED: ICD-10-PCS | Mod: ,,, | Performed by: NURSE PRACTITIONER

## 2022-05-09 PROCEDURE — 1160F RVW MEDS BY RX/DR IN RCRD: CPT | Mod: ,,, | Performed by: NURSE PRACTITIONER

## 2022-05-09 PROCEDURE — 3066F NEPHROPATHY DOC TX: CPT | Mod: ,,, | Performed by: NURSE PRACTITIONER

## 2022-05-09 PROCEDURE — G0439 PR MEDICARE ANNUAL WELLNESS SUBSEQUENT VISIT: ICD-10-PCS | Mod: ,,, | Performed by: NURSE PRACTITIONER

## 2022-05-09 PROCEDURE — 3066F PR DOCUMENTATION OF TREATMENT FOR NEPHROPATHY: ICD-10-PCS | Mod: ,,, | Performed by: NURSE PRACTITIONER

## 2022-05-09 PROCEDURE — 3078F PR MOST RECENT DIASTOLIC BLOOD PRESSURE < 80 MM HG: ICD-10-PCS | Mod: ,,, | Performed by: NURSE PRACTITIONER

## 2022-05-09 PROCEDURE — G0439 PPPS, SUBSEQ VISIT: HCPCS | Mod: ,,, | Performed by: NURSE PRACTITIONER

## 2022-05-09 PROCEDURE — 3078F DIAST BP <80 MM HG: CPT | Mod: ,,, | Performed by: NURSE PRACTITIONER

## 2022-05-09 PROCEDURE — 3008F BODY MASS INDEX DOCD: CPT | Mod: ,,, | Performed by: NURSE PRACTITIONER

## 2022-05-09 PROCEDURE — 3074F SYST BP LT 130 MM HG: CPT | Mod: ,,, | Performed by: NURSE PRACTITIONER

## 2022-05-09 PROCEDURE — 3044F HG A1C LEVEL LT 7.0%: CPT | Mod: ,,, | Performed by: NURSE PRACTITIONER

## 2022-05-09 PROCEDURE — 3074F PR MOST RECENT SYSTOLIC BLOOD PRESSURE < 130 MM HG: ICD-10-PCS | Mod: ,,, | Performed by: NURSE PRACTITIONER

## 2022-05-09 PROCEDURE — 3008F PR BODY MASS INDEX (BMI) DOCUMENTED: ICD-10-PCS | Mod: ,,, | Performed by: NURSE PRACTITIONER

## 2022-05-09 PROCEDURE — 1159F MED LIST DOCD IN RCRD: CPT | Mod: ,,, | Performed by: NURSE PRACTITIONER

## 2022-05-09 PROCEDURE — 3044F PR MOST RECENT HEMOGLOBIN A1C LEVEL <7.0%: ICD-10-PCS | Mod: ,,, | Performed by: NURSE PRACTITIONER

## 2022-05-09 PROCEDURE — 3061F PR NEG MICROALBUMINURIA RESULT DOCUMENTED/REVIEW: ICD-10-PCS | Mod: ,,, | Performed by: NURSE PRACTITIONER

## 2022-05-09 RX ORDER — ZOSTER VACCINE RECOMBINANT, ADJUVANTED 50 MCG/0.5
0.5 KIT INTRAMUSCULAR ONCE
Qty: 1 EACH | Refills: 1 | Status: SHIPPED | OUTPATIENT
Start: 2022-05-09 | End: 2022-05-09

## 2022-05-09 NOTE — PROGRESS NOTES
[place] [facility]       PATIENT NAME: Hailey Duval   : 1965    AGE: 56 y.o. DATE: 2022   MRN: [id number]        Reason for Visit / Chief Complaint: Medicare AWV Follow Up ([employer] WELLNESS SUBSEQUENT VISIT)        Hailey Duval presents for an Subsequent Medicare AWV today.     The following components were reviewed and updated:    Medical/Social/Family History:  Past Medical History:   Diagnosis Date    Anemia     Anemia, vitamin B12 deficiency     Chronic idiopathic constipation     chronic kidney disease     Depression     Diabetes mellitus, type 2     Dysphagia     GERD (gastroesophageal reflux disease)     Hyperlipidemia     Hypertension     Left ventricular hypertrophy     Nonrheumatic tricuspid (valve) insufficiency     Obese     Osteoarthritis of knee, unspecified     Ulcer of heel and midfoot         Family History   Problem Relation Age of Onset    Diabetes Mother     Hypertension Mother     Hypertension Father     Glaucoma Other     Heart disease Other     Hyperlipidemia Other     Breast cancer Sister         Social History     Tobacco Use   Smoking Status Never Smoker   Smokeless Tobacco Never Used      Social History     Substance and Sexual Activity   Alcohol Use Never       Family History   Problem Relation Age of Onset    Diabetes Mother     Hypertension Mother     Hypertension Father     Glaucoma Other     Heart disease Other     Hyperlipidemia Other     Breast cancer Sister        Past Surgical History:   Procedure Laterality Date     SECTION      FOOT SURGERY      JOINT REPLACEMENT Right     Hip    OTHER SURGICAL HISTORY      Foot and Toe surgery procedure screws in right foot          · Allergies and Current Medications   Review of patient's allergies indicates:  No Known Allergies    Current Outpatient Medications:     alcohol swabs (BD ALCOHOL SWABS) PadM, Use as directed, Disp:  300 each, Rfl: 4    aspirin (ECOTRIN) 81 MG EC tablet, Take 1 tablet (81 mg total) by mouth once daily., Disp: 90 tablet, Rfl: 0    blood sugar diagnostic (ACCU-CHEK GUIDE TEST STRIPS) Strp, Use as directed, Disp: 100 strip, Rfl: 0    blood-glucose meter (ACCU-CHEK GUIDE GLUCOSE METER) Misc, Use as directed, Disp: 1 each, Rfl: 3    cariprazine (VRAYLAR) 1.5 mg Cap, Take 1 capsule (1.5 mg total) by mouth once daily., Disp: 90 capsule, Rfl: 1    ergocalciferol (ERGOCALCIFEROL) 50,000 unit Cap, Take 1 capsule (50,000 Units total) by mouth every 7 days., Disp: 90 capsule, Rfl: 0    esomeprazole (NEXIUM) 40 MG capsule, Take 1 capsule (40 mg total) by mouth once daily., Disp: 90 capsule, Rfl: 1    glipiZIDE (GLUCOTROL) 5 MG tablet, Take 1 tablet (5 mg total) by mouth 2 (two) times daily with meals., Disp: 180 tablet, Rfl: 0    hydroCHLOROthiazide (HYDRODIURIL) 25 MG tablet, Take 1 tablet (25 mg total) by mouth once daily., Disp: 90 tablet, Rfl: 0    HYDROcodone-acetaminophen (NORCO)  mg per tablet, Take 1 tablet by mouth daily as needed. , Disp: , Rfl:     lancets (ACCU-CHEK SOFTCLIX LANCETS) Misc, Use three times daily, Disp: 100 each, Rfl: 0    lancets (ONETOUCH DELICA LANCETS) 33 gauge Misc, 90 lancets by Misc.(Non-Drug; Combo Route) route 3 (three) times daily., Disp: 90 each, Rfl: 11    lovastatin (MEVACOR) 40 MG tablet, Take 1 tablet (40 mg total) by mouth every evening., Disp: 90 tablet, Rfl: 0    metFORMIN (GLUCOPHAGE) 1000 MG tablet, Take 1 tablet (1,000 mg total) by mouth 2 (two) times daily., Disp: 60 tablet, Rfl: 0    semaglutide (OZEMPIC) 1 mg/dose (4 mg/3 mL), Inject 1 mg into the skin every 7 days., Disp: 3 pen, Rfl: 1    semaglutide 2 mg/dose (8 mg/3 mL) PnIj, Inject 2 mg into the skin every 7 days., Disp: 9 mL, Rfl: 1    traZODone (DESYREL) 100 MG tablet, Take 1 tablet (100 mg total) by mouth nightly as needed for Insomnia., Disp: 90 tablet, Rfl: 0    zinc gluconate 50 mg tablet,  Take 50 mg by mouth once daily., Disp: , Rfl:     diphth,pertus,acell,,tetanus (BOOSTRIX) 2.5-8-5 Lf-mcg-Lf/0.5mL Susp, Inject 0.5 mLs into the muscle once. for 1 dose, Disp: 0.5 mL, Rfl: 0    varicella-zoster gE-AS01B, PF, (SHINGRIX, PF,) 50 mcg/0.5 mL injection, Inject 0.5 mLs into the muscle once. for 1 dose, Disp: 1 each, Rfl: 1    · Health Risk Assessment   Fall Risk: No   Obesity: BMI 37.98     Advance Directive:  Does not have an advanced directive. Verbal education and written education included in today's AVS.    X Patient is interested in learning more about how to make advanced directives.  I provided them paperwork and offered to discuss this with them.   Depression: PHQ9 -2    HTN: 122/78   T2DM: A1C- 6.3    Tobacco use: Denies tobacco use  STI: Not at risk    Alcohol misuse: Denies alcohol use Cage Score: N/a   Statin Use: Continue statin use. Encouraged heart healthy diet & exercise   Mini Co/5      · Health Risk Assessment  What is your age?:  (55-60)  Are you male or female?: Female  During the past four weeks, how much have you been bothered by emotional problems such as feeling anxious, depressed, irritable, sad, or downhearted and blue?: Slightly  During the past five weeks, has your physical and/or emotional health limited your social activities with family, friends, neighbors, or groups?: Not at all  During the past four weeks, how much bodily pain have you generally had?: No pain  During the past four weeks, was someone available to help if you needed and wanted help?: Yes, as much as I wanted  During the past four weeks, what was the hardest physical activity you could do for at least two minutes?: Heavy  Can you get to places out of walking distance without help?  (For example, can you travel alone on buses or taxis, or drive your own car?): Yes  Can you go shopping for groceries or clothes without someone's help?: Yes  Can you prepare your own meals?: Yes  Can you do your own  housework without help?: Yes  Because of any health problems, do you need the help of another person with your personal care needs such as eating, bathing, dressing, or getting around the house?: No  Can you handle your own money without help?: Yes  During the past four weeks, how would you rate your health in general?: Very good  How have things been going for you during the past four weeks?: Pretty well  Are you having difficulties driving your car?: No  Do you always fasten your seat belt when you are in a car?: Yes, usually  How often in the past four weeks have you been bothered by falling or dizzy when standing up?: Never  How often in the past four weeks have you been bothered by sexual problems?: Never  How often in the past four weeks have you been bothered by trouble eating well?: Never  How often in the past four weeks have you been bothered by teeth or denture problems?: Never  How often in the past four weeks have you been bothered with problems using the telephone?: Never  How often in the past four weeks have you been bothered by tiredness or fatigue?: Never  Have you fallen two or more times in the past year?: No  Are you afraid of falling?: No  Are you a smoker?: No  During the past four weeks, how many drinks of wine, beer, or other alcoholic beverages did you have?: No alcohol at all  Do you exercise for about 20 minutes three or more days a week?: No, I usually do not exercise this much  Have you been given any information to help you with hazards in your house that might hurt you?: Yes  Have you been given any information to help you with keeping track of your medications?: Yes  How often do you have trouble taking medicines the way you've been told to take them?: I always take them as prescribed  How confident are you that you can control and manage most of your health problems?: Very confident  What is your race? (Check all that apply.):     · Health Maintenance   Last eye  exam: 2021   Last CV screen with lipids: 2022   Diabetes screening with fasting glucose or A1c: 2022   Colonoscopy: 10/06/2016- Repeat in 5 years. Due again. Ordered 2022. Awaiting referral appt   Flu Vaccine: 09/15/2021   Pneumonia vaccines: 01/10/2019   COVID vaccine: 03/10/2021; 10/27/2021; 2022   Hep B vaccine: Not at risk   DEXA: N/a   Last pap/pelvic: 09/15/2021- Negative with + HPV. Has been sent for referral to OB/GYN   Last Mammogram: 2021- Negative. Has appt on 2022   Last PSA screen: N/A   AAA screening: N/A (once in lifetime for males 65-75 who have smoked > 100 cigarettes in lifetime)  HIV Screein2022  Hepatitis C Screen: 2022  Low Dose CT Scan: N/A    Health Maintenance Topics with due status: Not Due       Topic Last Completion Date    Mammogram 2021    Foot Exam 2021    Cervical Cancer Screening 09/15/2021    Eye Exam 2021    Diabetes Urine Screening 2022    Lipid Panel 2022    Hemoglobin A1c 2022    Low Dose Statin 2022     There are no preventive care reminders to display for this patient.     Incontinence  Bowel: No  Bladder: No    Lab results available in Epic or see dates from [facility] above:   Lab Results   Component Value Date    CHOL 166 2022    CHOL 179 2022    CHOL 164 2021     Lab Results   Component Value Date    HDL 65 (H) 2022    HDL 74 (H) 2022    HDL 66 (H) 2021     Lab Results   Component Value Date    LDLCALC 84 2022    LDLCALC 89 2022    LDLCALC 78 2021     Lab Results   Component Value Date    TRIG 84 2022    TRIG 80 2022    TRIG 99 2021     Lab Results   Component Value Date    CHOLHDL 2.6 2022    CHOLHDL 2.4 2022    CHOLHDL 2.5 2021       Lab Results   Component Value Date    HGBA1C 6.3 2022       Sodium   Date Value Ref Range Status   2022 138 136 - 145 mmol/L Final     Potassium  "  Date Value Ref Range Status   05/04/2022 3.9 3.5 - 5.1 mmol/L Final     Chloride   Date Value Ref Range Status   05/04/2022 102 98 - 107 mmol/L Final     CO2   Date Value Ref Range Status   05/04/2022 27 21 - 32 mmol/L Final     Glucose   Date Value Ref Range Status   05/04/2022 126 (H) 74 - 106 mg/dL Final     BUN   Date Value Ref Range Status   05/04/2022 17 7 - 18 mg/dL Final     Creatinine   Date Value Ref Range Status   05/04/2022 0.99 0.55 - 1.02 mg/dL Final     Calcium   Date Value Ref Range Status   05/04/2022 9.1 8.5 - 10.1 mg/dL Final     Total Protein   Date Value Ref Range Status   05/04/2022 6.7 6.4 - 8.2 g/dL Final     Albumin   Date Value Ref Range Status   05/04/2022 3.2 (L) 3.5 - 5.0 g/dL Final     Bilirubin, Total   Date Value Ref Range Status   05/04/2022 0.2 0.0 - 1.2 mg/dL Final     Alk Phos   Date Value Ref Range Status   05/04/2022 92 46 - 118 U/L Final     AST   Date Value Ref Range Status   05/04/2022 9 (L) 15 - 37 U/L Final     ALT   Date Value Ref Range Status   05/04/2022 24 13 - 56 U/L Final     Anion Gap   Date Value Ref Range Status   05/04/2022 13 7 - 16 mmol/L Final     eGFR    Date Value Ref Range Status   11/16/2021 68 >=60 mL/min/1.73m² Final     eGFR   Date Value Ref Range Status   05/04/2022 62 >=60 mL/min/1.73m² Final       · Care Team   PCP: MAGDA MarquezC    Eye specialist: [facility] [place]   Cardiologist: Luis       **See Completed Assessments for Annual Wellness visit within the encounter summary    The following assessments were completed & reviewed:  · Depression Screening  · Cognitive function Screening  · Timed Get Up Test  · Whisper Test  · Vision Screen  · Health Risk Assessment  · Checklist of ADLs and IADLs      Objective  Vitals:    05/09/22 1048   BP: 122/78   Pulse: 65   Resp: 20   Temp: 98 °F (36.7 °C)   TempSrc: Oral   SpO2: 98%   Weight: 91.2 kg (201 lb)   Height: 5' 1" (1.549 m)   PainSc: 0-No pain      Body mass index is 37.98 " kg/m².  Ideal body weight: 47.8 kg (105 lb 6.1 oz)       Physical Exam  Vitals and nursing note reviewed.   Constitutional:       Appearance: Normal appearance.   HENT:      Head: Normocephalic and atraumatic.      Right Ear: External ear normal.      Left Ear: External ear normal.      Nose: Nose normal.      Mouth/Throat:      Mouth: Mucous membranes are dry.      Pharynx: Oropharynx is clear.   Eyes:      Pupils: Pupils are equal, round, and reactive to light.   Cardiovascular:      Rate and Rhythm: Normal rate and regular rhythm.      Heart sounds: No murmur heard.    No gallop.   Pulmonary:      Effort: Pulmonary effort is normal.      Breath sounds: No wheezing or rales.   Abdominal:      General: Bowel sounds are normal. There is no distension.      Palpations: Abdomen is soft.      Tenderness: There is no abdominal tenderness.   Musculoskeletal:      Cervical back: Normal range of motion and neck supple.   Skin:     General: Skin is warm and dry.   Neurological:      General: No focal deficit present.      Mental Status: She is alert and oriented to person, place, and time.   Psychiatric:         Mood and Affect: Mood normal.         Behavior: Behavior normal.           Assessment:     1. Type 2 diabetes mellitus without complication, without long-term current use of insulin    2. Hyperlipidemia associated with type 2 diabetes mellitus    3. Essential hypertension    4. Gastroesophageal reflux disease, unspecified whether esophagitis present    5. Bipolar depression    6. BMI 37.0-37.9, adult    7. Encounter for preventive health examination    8. Overweight         Plan:    Referrals/Orders:  Shingrix/Boostrix     Advised to call office if does not hear from anyone with referral appt within 2-3 weeks to check on status of referral. Voiced understanding.    Keep current on appts & continue medications as ordered. Encouraged diet & exercise to decrease weight/BMI. Go to pharmacy to get Shingles/TDAP  vaccines.      Discussed and provided with a screening schedule and personal prevention plan in accordance with USPSTF age appropriate recommendations and Medicare screening guidelines.   Education, counseling, and referrals were provided as needed.  After Visit Summary printed and given to patient which includes written education and a list of any referrals if indicated. All education discussed with patient & handouts given to patient.      F/u plan for yearly AWV.    Signature: PAMELA Read

## 2022-05-09 NOTE — PATIENT INSTRUCTIONS
Counseling and Referral of Other Preventative  (Italic type indicates deductible and co-insurance are waived)    Patient Name: Hailey Duval  Today's Date: 5/9/2022    Health Maintenance       Date Due Completion Date    TETANUS VACCINE 11/09/2022 (Originally 10/17/1984) ---    Shingles Vaccine (2 of 2) 11/09/2022 (Originally 5/10/2021) 3/15/2021    Colorectal Cancer Screening 05/09/2023 (Originally 1965) 10/6/2016    Mammogram 09/07/2022 9/7/2021    Foot Exam 09/11/2022 9/11/2021 (Done)    Override on 9/11/2021: Done    Hemoglobin A1c 11/04/2022 5/4/2022    Eye Exam 11/18/2022 11/18/2021    Diabetes Urine Screening 05/04/2023 5/4/2022    Lipid Panel 05/04/2023 5/4/2022    Low Dose Statin 05/09/2023 5/9/2022    Cervical Cancer Screening 09/15/2026 9/15/2021        No orders of the defined types were placed in this encounter.    The following information is provided to all patients.  This information is to help you find resources for any of the problems found today that may be affecting your health:                Living healthy guide: www.[facility].[place].gov      Understanding Diabetes: www.diabetes.org      Eating healthy: www.cdc.gov/healthyweight      CDC home safety checklist: www.cdc.gov/steadi/patient.html      Agency on Aging: www.goea.[place].gov      Alcoholics anonymous (AA): www.aa.org      Physical Activity: www.shanice.nih.gov/rh3ocns      Tobacco use: www.quitwithusla.org

## 2022-05-12 ENCOUNTER — OFFICE VISIT (OUTPATIENT)
Dept: FAMILY MEDICINE | Facility: CLINIC | Age: 57
End: 2022-05-12
Payer: MEDICARE

## 2022-05-12 ENCOUNTER — APPOINTMENT (OUTPATIENT)
Dept: RADIOLOGY | Facility: CLINIC | Age: 57
End: 2022-05-12
Attending: NURSE PRACTITIONER
Payer: MEDICARE

## 2022-05-12 VITALS
TEMPERATURE: 98 F | BODY MASS INDEX: 38.14 KG/M2 | DIASTOLIC BLOOD PRESSURE: 68 MMHG | HEIGHT: 61 IN | RESPIRATION RATE: 18 BRPM | HEART RATE: 85 BPM | WEIGHT: 202 LBS | SYSTOLIC BLOOD PRESSURE: 111 MMHG

## 2022-05-12 DIAGNOSIS — M25.569 KNEE PAIN, UNSPECIFIED CHRONICITY, UNSPECIFIED LATERALITY: Primary | ICD-10-CM

## 2022-05-12 DIAGNOSIS — M25.569 KNEE PAIN, UNSPECIFIED CHRONICITY, UNSPECIFIED LATERALITY: ICD-10-CM

## 2022-05-12 DIAGNOSIS — M17.12 TRICOMPARTMENT OSTEOARTHRITIS OF LEFT KNEE: ICD-10-CM

## 2022-05-12 PROCEDURE — 3061F PR NEG MICROALBUMINURIA RESULT DOCUMENTED/REVIEW: ICD-10-PCS | Mod: ,,, | Performed by: NURSE PRACTITIONER

## 2022-05-12 PROCEDURE — 3008F PR BODY MASS INDEX (BMI) DOCUMENTED: ICD-10-PCS | Mod: ,,, | Performed by: NURSE PRACTITIONER

## 2022-05-12 PROCEDURE — 3044F PR MOST RECENT HEMOGLOBIN A1C LEVEL <7.0%: ICD-10-PCS | Mod: ,,, | Performed by: NURSE PRACTITIONER

## 2022-05-12 PROCEDURE — 3074F SYST BP LT 130 MM HG: CPT | Mod: ,,, | Performed by: NURSE PRACTITIONER

## 2022-05-12 PROCEDURE — 3074F PR MOST RECENT SYSTOLIC BLOOD PRESSURE < 130 MM HG: ICD-10-PCS | Mod: ,,, | Performed by: NURSE PRACTITIONER

## 2022-05-12 PROCEDURE — 3078F DIAST BP <80 MM HG: CPT | Mod: ,,, | Performed by: NURSE PRACTITIONER

## 2022-05-12 PROCEDURE — 1159F MED LIST DOCD IN RCRD: CPT | Mod: ,,, | Performed by: NURSE PRACTITIONER

## 2022-05-12 PROCEDURE — 99213 PR OFFICE/OUTPT VISIT, EST, LEVL III, 20-29 MIN: ICD-10-PCS | Mod: 25,,, | Performed by: NURSE PRACTITIONER

## 2022-05-12 PROCEDURE — 73560 X-RAY EXAM OF KNEE 1 OR 2: CPT | Mod: 26,LT,, | Performed by: RADIOLOGY

## 2022-05-12 PROCEDURE — 1160F RVW MEDS BY RX/DR IN RCRD: CPT | Mod: ,,, | Performed by: NURSE PRACTITIONER

## 2022-05-12 PROCEDURE — 96372 PR INJECTION,THERAP/PROPH/DIAG2ST, IM OR SUBCUT: ICD-10-PCS | Mod: ,,, | Performed by: NURSE PRACTITIONER

## 2022-05-12 PROCEDURE — 3044F HG A1C LEVEL LT 7.0%: CPT | Mod: ,,, | Performed by: NURSE PRACTITIONER

## 2022-05-12 PROCEDURE — 3066F NEPHROPATHY DOC TX: CPT | Mod: ,,, | Performed by: NURSE PRACTITIONER

## 2022-05-12 PROCEDURE — 3008F BODY MASS INDEX DOCD: CPT | Mod: ,,, | Performed by: NURSE PRACTITIONER

## 2022-05-12 PROCEDURE — 3078F PR MOST RECENT DIASTOLIC BLOOD PRESSURE < 80 MM HG: ICD-10-PCS | Mod: ,,, | Performed by: NURSE PRACTITIONER

## 2022-05-12 PROCEDURE — 3066F PR DOCUMENTATION OF TREATMENT FOR NEPHROPATHY: ICD-10-PCS | Mod: ,,, | Performed by: NURSE PRACTITIONER

## 2022-05-12 PROCEDURE — 1160F PR REVIEW ALL MEDS BY PRESCRIBER/CLIN PHARMACIST DOCUMENTED: ICD-10-PCS | Mod: ,,, | Performed by: NURSE PRACTITIONER

## 2022-05-12 PROCEDURE — 73560 X-RAY EXAM OF KNEE 1 OR 2: CPT | Mod: TC,RHCUB,FY,LT | Performed by: NURSE PRACTITIONER

## 2022-05-12 PROCEDURE — 73560 XR KNEE 1 OR 2 VIEW LEFT: ICD-10-PCS | Mod: 26,LT,, | Performed by: RADIOLOGY

## 2022-05-12 PROCEDURE — 99213 OFFICE O/P EST LOW 20 MIN: CPT | Mod: 25,,, | Performed by: NURSE PRACTITIONER

## 2022-05-12 PROCEDURE — 96372 THER/PROPH/DIAG INJ SC/IM: CPT | Mod: ,,, | Performed by: NURSE PRACTITIONER

## 2022-05-12 PROCEDURE — 1159F PR MEDICATION LIST DOCUMENTED IN MEDICAL RECORD: ICD-10-PCS | Mod: ,,, | Performed by: NURSE PRACTITIONER

## 2022-05-12 PROCEDURE — 3061F NEG MICROALBUMINURIA REV: CPT | Mod: ,,, | Performed by: NURSE PRACTITIONER

## 2022-05-12 RX ORDER — METHYLPREDNISOLONE ACETATE 80 MG/ML
40 INJECTION, SUSPENSION INTRA-ARTICULAR; INTRALESIONAL; INTRAMUSCULAR; SOFT TISSUE ONCE
Status: COMPLETED | OUTPATIENT
Start: 2022-05-12 | End: 2022-05-12

## 2022-05-12 RX ORDER — DICLOFENAC SODIUM 10 MG/G
2 GEL TOPICAL 4 TIMES DAILY
Qty: 200 G | Refills: 0 | Status: SHIPPED | OUTPATIENT
Start: 2022-05-12

## 2022-05-12 RX ORDER — KETOROLAC TROMETHAMINE 30 MG/ML
60 INJECTION, SOLUTION INTRAMUSCULAR; INTRAVENOUS
Status: COMPLETED | OUTPATIENT
Start: 2022-05-12 | End: 2022-05-12

## 2022-05-12 RX ORDER — DEXAMETHASONE SODIUM PHOSPHATE 4 MG/ML
4 INJECTION, SOLUTION INTRA-ARTICULAR; INTRALESIONAL; INTRAMUSCULAR; INTRAVENOUS; SOFT TISSUE ONCE
Status: COMPLETED | OUTPATIENT
Start: 2022-05-12 | End: 2022-05-12

## 2022-05-12 RX ADMIN — KETOROLAC TROMETHAMINE 60 MG: 30 INJECTION, SOLUTION INTRAMUSCULAR; INTRAVENOUS at 04:05

## 2022-05-12 RX ADMIN — DEXAMETHASONE SODIUM PHOSPHATE 4 MG: 4 INJECTION, SOLUTION INTRA-ARTICULAR; INTRALESIONAL; INTRAMUSCULAR; INTRAVENOUS; SOFT TISSUE at 04:05

## 2022-05-12 RX ADMIN — METHYLPREDNISOLONE ACETATE 40 MG: 80 INJECTION, SUSPENSION INTRA-ARTICULAR; INTRALESIONAL; INTRAMUSCULAR; SOFT TISSUE at 04:05

## 2022-05-12 NOTE — PROGRESS NOTES
CHANDU Concepcion   [address] [place] [address]     PATIENT NAME: Hailey Duval  : 1965  DATE: 22  MRN: [id number]      Billing Provider: CHADNU Concepcion  Level of Service:   Patient PCP Information     Provider PCP Type    CHANDU Concepcion General          Reason for Visit / Chief Complaint: Knee Pain (Left knee pain)       Update PCP  Update Chief Complaint         History of Present Illness / Problem Focused Workflow     Hailey Duval presents to the clinic with Knee Pain (Left knee pain)     OC is a 55y/o AA female who presents to clinic today for evaluation of left knee pain. Patient reports pain began 2 days ago in which she self treated with IceCool without symptomatic resolution. Patient reports pain with extension, flexion, and squatting.     Patient denies injury.       Review of Systems     Review of Systems   Constitutional: Negative for chills, diaphoresis and fatigue.   Musculoskeletal: Positive for joint swelling and leg pain.        Medical / Social / Family History     Past Medical History:   Diagnosis Date    Anemia     Anemia, vitamin B12 deficiency     Chronic idiopathic constipation     chronic kidney disease     Depression     Diabetes mellitus, type 2     Dysphagia     GERD (gastroesophageal reflux disease)     Hyperlipidemia     Hypertension     Left ventricular hypertrophy     Nonrheumatic tricuspid (valve) insufficiency     Obese     Osteoarthritis of knee, unspecified     Ulcer of heel and midfoot        Past Surgical History:   Procedure Laterality Date     SECTION      FOOT SURGERY      JOINT REPLACEMENT Right     Hip    OTHER SURGICAL HISTORY      Foot and Toe surgery procedure screws in right foot        Social History  Ms.  reports that she has never smoked. She has never used smokeless tobacco. She reports current drug use. Drug: Marijuana. She reports that she does not drink alcohol.    Family History  Ms.'s family  "history includes Breast cancer in her sister; Diabetes in her mother; Glaucoma in her other; Heart disease in her other; Hyperlipidemia in her other; Hypertension in her father and mother.    PHQ9 5/9/2022   Total Score 2           Medications and Allergies     Medications  No outpatient medications have been marked as taking for the 5/12/22 encounter (Office Visit) with CHANDU Concepcion.       Allergies  Review of patient's allergies indicates:  No Known Allergies    Physical Examination   /68 (BP Location: Left arm, Patient Position: Sitting, BP Method: Large (Automatic))   Pulse 85   Temp 97.7 °F (36.5 °C) (Temporal)   Resp 18   Ht 5' 1" (1.549 m)   Wt 91.6 kg (202 lb)   BMI 38.17 kg/m²   Physical Exam  Vitals and nursing note reviewed.   Constitutional:       Appearance: Normal appearance.   HENT:      Head: Normocephalic and atraumatic.      Right Ear: External ear normal.      Left Ear: External ear normal.      Nose: Nose normal.      Mouth/Throat:      Mouth: Mucous membranes are moist.      Pharynx: Oropharynx is clear.   Eyes:      Pupils: Pupils are equal, round, and reactive to light.   Cardiovascular:      Rate and Rhythm: Normal rate and regular rhythm.      Pulses: Normal pulses.      Heart sounds: Normal heart sounds. No murmur heard.    No gallop.   Pulmonary:      Effort: Pulmonary effort is normal.      Breath sounds: Normal breath sounds. No wheezing or rales.   Abdominal:      General: Abdomen is flat. Bowel sounds are normal. There is no distension.      Palpations: Abdomen is soft.      Tenderness: There is no abdominal tenderness.   Musculoskeletal:         General: Normal range of motion.      Cervical back: Normal range of motion and neck supple.      Left knee: Effusion present. No erythema. Tenderness present over the medial joint line and patellar tendon.   Skin:     General: Skin is warm and dry.      Capillary Refill: Capillary refill takes less than 2 seconds. "   Neurological:      General: No focal deficit present.      Mental Status: She is alert and oriented to person, place, and time.   Psychiatric:         Mood and Affect: Mood normal.         Behavior: Behavior normal.          Assessment and Plan (including Health Maintenance)      Problem List  Smart Sets  Document Outside HM   :    Plan:         There are no preventive care reminders to display for this patient.    Problem List Items Addressed This Visit    None     Visit Diagnoses     Knee pain, unspecified chronicity, unspecified laterality    -  Primary    Relevant Orders    X-Ray Knee 1 or 2 View Left (Completed)          Health Maintenance Topics with due status: Not Due       Topic Last Completion Date    Mammogram 09/07/2021    Foot Exam 09/11/2021    Cervical Cancer Screening 09/15/2021    Eye Exam 11/18/2021    Diabetes Urine Screening 05/04/2022    Lipid Panel 05/04/2022    Hemoglobin A1c 05/04/2022    Low Dose Statin 05/09/2022       Future Appointments   Date Time Provider Department Center   5/24/2022  8:30 AM ALEX Jackson [facility]   8/3/2022  8:00 AM CHANDU Concepcion [facility] [place]   9/12/2022  7:30 AM [facility] MAM[facility]1 OB [facility] [place]   5/10/2023  9:00 AM AWV NURSE, [facility] FAMILY MEDICINE [facility] [place]        No follow-ups on file.        Signature:  CHANDU Concepcion      [address] [place] [address]    Date of encounter: 5/12/22

## 2022-05-19 ENCOUNTER — OFFICE VISIT (OUTPATIENT)
Dept: FAMILY MEDICINE | Facility: CLINIC | Age: 57
End: 2022-05-19
Payer: MEDICARE

## 2022-05-19 DIAGNOSIS — U07.1 COVID: ICD-10-CM

## 2022-05-19 DIAGNOSIS — U07.1 COVID-19: Primary | ICD-10-CM

## 2022-05-19 PROCEDURE — 1160F RVW MEDS BY RX/DR IN RCRD: CPT | Mod: 95,,, | Performed by: NURSE PRACTITIONER

## 2022-05-19 PROCEDURE — 3044F HG A1C LEVEL LT 7.0%: CPT | Mod: 95,,, | Performed by: NURSE PRACTITIONER

## 2022-05-19 PROCEDURE — 3066F NEPHROPATHY DOC TX: CPT | Mod: 95,,, | Performed by: NURSE PRACTITIONER

## 2022-05-19 PROCEDURE — 1159F MED LIST DOCD IN RCRD: CPT | Mod: 95,,, | Performed by: NURSE PRACTITIONER

## 2022-05-19 PROCEDURE — 99441 PR PHYSICIAN TELEPHONE EVALUATION 5-10 MIN: ICD-10-PCS | Mod: 95,,, | Performed by: NURSE PRACTITIONER

## 2022-05-19 PROCEDURE — 3061F PR NEG MICROALBUMINURIA RESULT DOCUMENTED/REVIEW: ICD-10-PCS | Mod: 95,,, | Performed by: NURSE PRACTITIONER

## 2022-05-19 PROCEDURE — 3061F NEG MICROALBUMINURIA REV: CPT | Mod: 95,,, | Performed by: NURSE PRACTITIONER

## 2022-05-19 PROCEDURE — 3044F PR MOST RECENT HEMOGLOBIN A1C LEVEL <7.0%: ICD-10-PCS | Mod: 95,,, | Performed by: NURSE PRACTITIONER

## 2022-05-19 PROCEDURE — 1160F PR REVIEW ALL MEDS BY PRESCRIBER/CLIN PHARMACIST DOCUMENTED: ICD-10-PCS | Mod: 95,,, | Performed by: NURSE PRACTITIONER

## 2022-05-19 PROCEDURE — 1159F PR MEDICATION LIST DOCUMENTED IN MEDICAL RECORD: ICD-10-PCS | Mod: 95,,, | Performed by: NURSE PRACTITIONER

## 2022-05-19 PROCEDURE — 3066F PR DOCUMENTATION OF TREATMENT FOR NEPHROPATHY: ICD-10-PCS | Mod: 95,,, | Performed by: NURSE PRACTITIONER

## 2022-05-19 PROCEDURE — 99441 PR PHYSICIAN TELEPHONE EVALUATION 5-10 MIN: CPT | Mod: 95,,, | Performed by: NURSE PRACTITIONER

## 2022-05-19 RX ORDER — UREA 10 %
220 LOTION (ML) TOPICAL DAILY
Qty: 20 TABLET | Refills: 0 | Status: SHIPPED | OUTPATIENT
Start: 2022-05-19 | End: 2022-12-22

## 2022-05-19 RX ORDER — DEXAMETHASONE 6 MG/1
6 TABLET ORAL
Qty: 10 TABLET | Refills: 0 | Status: SHIPPED | OUTPATIENT
Start: 2022-05-19 | End: 2022-05-29

## 2022-05-19 RX ORDER — CETIRIZINE HYDROCHLORIDE 10 MG/1
10 TABLET ORAL DAILY
Qty: 30 TABLET | Refills: 0 | Status: SHIPPED | OUTPATIENT
Start: 2022-05-19 | End: 2022-08-10

## 2022-05-19 RX ORDER — AZITHROMYCIN 250 MG/1
TABLET, FILM COATED ORAL
Qty: 6 TABLET | Refills: 0 | Status: SHIPPED | OUTPATIENT
Start: 2022-05-19 | End: 2022-12-22

## 2022-05-19 RX ORDER — IBUPROFEN 100 MG/5ML
1000 SUSPENSION, ORAL (FINAL DOSE FORM) ORAL DAILY
Qty: 28 TABLET | Refills: 0 | Status: SHIPPED | OUTPATIENT
Start: 2022-05-19 | End: 2022-12-22

## 2022-05-19 NOTE — PROGRESS NOTES
Established Patient - Audio Only Telehealth Visit     The patient location is: [place]  The chief complaint leading to consultation is: covid  Visit type: Virtual visit with audio only (telephone)  Total time spent with patient: 5 min       The reason for the audio only service rather than synchronous audio and video virtual visit was related to technical difficulties or patient preference/necessity.     Each patient to whom I provide medical services by telemedicine is:  (1) informed of the relationship between the physician and patient and the respective role of any other health care provider with respect to management of the patient; and (2) notified that they may decline to receive medical services by telemedicine and may withdraw from such care at any time. Patient verbally consented to receive this service via voice-only telephone call.       HPI: Hailey Duval is a 56 y.o. female who presents today for a virtual audio visit due to complaints of cough and congestion. Symptoms started 3  days ago. Associated symptoms include fatigue . Symptoms are continuous and not improving. Due to no alleviating factors, the decision was made to contact the clinic for further advice.       Review of Systems     Review of Systems   Constitutional: Positive for fatigue.   HENT: Positive for postnasal drip and sinus pressure/congestion.    Respiratory: Positive for cough.         Medical / Social / Family History     Past Medical History:   Diagnosis Date    Anemia     Anemia, vitamin B12 deficiency     Chronic idiopathic constipation     chronic kidney disease     Depression     Diabetes mellitus, type 2     Dysphagia     GERD (gastroesophageal reflux disease)     Hyperlipidemia     Hypertension     Left ventricular hypertrophy     Nonrheumatic tricuspid (valve) insufficiency     Obese     Osteoarthritis of knee, unspecified     Ulcer of heel and midfoot        Past Surgical History:   Procedure Laterality Date      SECTION      FOOT SURGERY      JOINT REPLACEMENT Right     Hip    OTHER SURGICAL HISTORY      Foot and Toe surgery procedure screws in right foot        Social History  Ms.  reports that she has never smoked. She has never used smokeless tobacco. She reports current drug use. Drug: Marijuana. She reports that she does not drink alcohol.    Family History  Ms.'s family history includes Breast cancer in her sister; Diabetes in her mother; Glaucoma in her other; Heart disease in her other; Hyperlipidemia in her other; Hypertension in her father and mother.    Medications and Allergies     Medications  No outpatient medications have been marked as taking for the 22 encounter (Office Visit) with CHANDU Concepcion.       Allergies  Review of patient's allergies indicates:  No Known Allergies            Plan:   1. Swab for COVID  2. Tylenol as needed for Pain/Discomfort at recommended over the counter dosage  Please isolate yourself at home.  You may leave home and/or return to work once the following conditions are met:    If you were not hospitalized and are not moderately to severely immunocompromised:    More than 5 days since symptoms first appeared AND   More than 24 hours fever free without medications AND   Symptoms are improving   Continue to wear a mask around others for 5 additional days.    If you were hospitalized OR are moderately to severely immunocompromised:   More than 20 days since symptoms first appeared   More than 24 hours fever free without medications   Symptoms have improved    If you had no symptoms but tested positive:   More than 5 days since the date of the first positive test (20 days if moderately to severely immunocompromised). If you develop symptoms, then use the guidelines above.   Continue to wear a mask around others for 5 additional days.  3.                               Problem List Items Addressed This Visit        ID    COVID      Other Visit  Diagnoses     COVID-19    -  Primary    Relevant Medications    azithromycin (Z-JAMES) 250 MG tablet    dexAMETHasone (DECADRON) 6 MG tablet    zinc sulfate 50 mg zinc (220 mg) Tab tablet    ascorbic acid, vitamin C, (VITAMIN C) 1000 MG tablet    cetirizine (ZYRTEC) 10 MG tablet    Other Relevant Orders    Ambulatory referral/consult to [facility]              Future Appointments   Date Time Provider Department Center   5/24/2022  8:30 [facility]   6/15/2022 10:00 AM Damon Lee MD [facility]   8/3/2022  8:00 AM CHANDU Concepcion [facility] [place]   9/12/2022  7:30 AM [facility] MAMMO1 [facility] MMIC [facility] [place]   5/10/2023  9:00 AM AWV NURSE, [facility] FAMILY MEDICINE [facility] BLANCA Denson Paige        Follow up in about 3 months (around 8/19/2022), or if symptoms worsen or fail to improve.        Signature:  CHANDU Concepcion      [address] [place] [address]    Date of encounter: 5/19/22                 This service was not originating from a related E/M service provided within the previous 7 days nor will  to an E/M service or procedure within the next 24 hours or my soonest available appointment.  Prevailing standard of care was able to be met in this audio-only visit.

## 2022-05-20 ENCOUNTER — INFUSION (OUTPATIENT)
Dept: INFECTIOUS DISEASES | Facility: HOSPITAL | Age: 57
End: 2022-05-20
Attending: NURSE PRACTITIONER
Payer: MEDICARE

## 2022-05-20 VITALS
HEART RATE: 87 BPM | RESPIRATION RATE: 14 BRPM | SYSTOLIC BLOOD PRESSURE: 127 MMHG | DIASTOLIC BLOOD PRESSURE: 82 MMHG | TEMPERATURE: 99 F

## 2022-05-20 DIAGNOSIS — U07.1 COVID-19: ICD-10-CM

## 2022-05-20 PROCEDURE — 63600175 PHARM REV CODE 636 W HCPCS: Performed by: NURSE PRACTITIONER

## 2022-05-20 PROCEDURE — M0222 HC IV INJECTION, BEBTELOVIMAB, INCL POST ADMIN MONIT: HCPCS | Performed by: NURSE PRACTITIONER

## 2022-05-20 RX ORDER — ONDANSETRON 4 MG/1
4 TABLET, ORALLY DISINTEGRATING ORAL
Status: ACTIVE | OUTPATIENT
Start: 2022-05-20 | End: 2022-05-21

## 2022-05-20 RX ORDER — ALBUTEROL SULFATE 90 UG/1
2 AEROSOL, METERED RESPIRATORY (INHALATION)
Status: ACTIVE | OUTPATIENT
Start: 2022-05-20 | End: 2022-05-23

## 2022-05-20 RX ORDER — ACETAMINOPHEN 325 MG/1
650 TABLET ORAL
Status: ACTIVE | OUTPATIENT
Start: 2022-05-20 | End: 2022-05-21

## 2022-05-20 RX ORDER — EPINEPHRINE 0.3 MG/.3ML
0.3 INJECTION SUBCUTANEOUS
Status: ACTIVE | OUTPATIENT
Start: 2022-05-20 | End: 2022-05-23

## 2022-05-20 RX ORDER — BEBTELOVIMAB 87.5 MG/ML
175 INJECTION, SOLUTION INTRAVENOUS
Status: COMPLETED | OUTPATIENT
Start: 2022-05-20 | End: 2022-05-20

## 2022-05-20 RX ORDER — DIPHENHYDRAMINE HYDROCHLORIDE 50 MG/ML
25 INJECTION INTRAMUSCULAR; INTRAVENOUS
Status: ACTIVE | OUTPATIENT
Start: 2022-05-20 | End: 2022-05-21

## 2022-05-20 RX ADMIN — BEBTELOVIMAB 175 MG: 87.5 INJECTION, SOLUTION INTRAVENOUS at 11:05

## 2022-05-20 NOTE — PROGRESS NOTES
1104Pt to room ambulatory, with also + family member. Both for infusion, verbalized understanding of need for medications and wishes to proceed.  Tena sunshine   1225  No s/s of reaction noted   1255  Ambulatory to pov, pt verbalized understanding of all d/c and f/u instructions.

## 2022-05-21 PROBLEM — M25.569 KNEE PAIN: Status: ACTIVE | Noted: 2022-05-21

## 2022-05-21 PROBLEM — M17.12 TRICOMPARTMENT OSTEOARTHRITIS OF LEFT KNEE: Status: ACTIVE | Noted: 2022-05-21

## 2022-08-04 ENCOUNTER — OFFICE VISIT (OUTPATIENT)
Dept: CARDIOLOGY | Facility: CLINIC | Age: 57
End: 2022-08-04
Payer: COMMERCIAL

## 2022-08-04 VITALS
OXYGEN SATURATION: 99 % | HEIGHT: 61 IN | BODY MASS INDEX: 36.82 KG/M2 | SYSTOLIC BLOOD PRESSURE: 128 MMHG | HEART RATE: 76 BPM | DIASTOLIC BLOOD PRESSURE: 60 MMHG | WEIGHT: 195 LBS

## 2022-08-04 DIAGNOSIS — I10 HYPERTENSION, UNSPECIFIED TYPE: Primary | ICD-10-CM

## 2022-08-04 PROCEDURE — 3008F BODY MASS INDEX DOCD: CPT | Mod: CPTII,,, | Performed by: NURSE PRACTITIONER

## 2022-08-04 PROCEDURE — 3078F DIAST BP <80 MM HG: CPT | Mod: CPTII,,, | Performed by: NURSE PRACTITIONER

## 2022-08-04 PROCEDURE — 3044F HG A1C LEVEL LT 7.0%: CPT | Mod: CPTII,,, | Performed by: NURSE PRACTITIONER

## 2022-08-04 PROCEDURE — 93010 EKG 12-LEAD: ICD-10-PCS | Mod: S$PBB,,, | Performed by: INTERNAL MEDICINE

## 2022-08-04 PROCEDURE — 3066F PR DOCUMENTATION OF TREATMENT FOR NEPHROPATHY: ICD-10-PCS | Mod: CPTII,,, | Performed by: NURSE PRACTITIONER

## 2022-08-04 PROCEDURE — 3061F NEG MICROALBUMINURIA REV: CPT | Mod: CPTII,,, | Performed by: NURSE PRACTITIONER

## 2022-08-04 PROCEDURE — 1159F MED LIST DOCD IN RCRD: CPT | Mod: CPTII,,, | Performed by: NURSE PRACTITIONER

## 2022-08-04 PROCEDURE — 99215 OFFICE O/P EST HI 40 MIN: CPT | Mod: PBBFAC | Performed by: NURSE PRACTITIONER

## 2022-08-04 PROCEDURE — 3008F PR BODY MASS INDEX (BMI) DOCUMENTED: ICD-10-PCS | Mod: CPTII,,, | Performed by: NURSE PRACTITIONER

## 2022-08-04 PROCEDURE — 93010 ELECTROCARDIOGRAM REPORT: CPT | Mod: S$PBB,,, | Performed by: INTERNAL MEDICINE

## 2022-08-04 PROCEDURE — 3078F PR MOST RECENT DIASTOLIC BLOOD PRESSURE < 80 MM HG: ICD-10-PCS | Mod: CPTII,,, | Performed by: NURSE PRACTITIONER

## 2022-08-04 PROCEDURE — 3066F NEPHROPATHY DOC TX: CPT | Mod: CPTII,,, | Performed by: NURSE PRACTITIONER

## 2022-08-04 PROCEDURE — 1159F PR MEDICATION LIST DOCUMENTED IN MEDICAL RECORD: ICD-10-PCS | Mod: CPTII,,, | Performed by: NURSE PRACTITIONER

## 2022-08-04 PROCEDURE — 3074F PR MOST RECENT SYSTOLIC BLOOD PRESSURE < 130 MM HG: ICD-10-PCS | Mod: CPTII,,, | Performed by: NURSE PRACTITIONER

## 2022-08-04 PROCEDURE — 99214 OFFICE O/P EST MOD 30 MIN: CPT | Mod: S$PBB,,, | Performed by: NURSE PRACTITIONER

## 2022-08-04 PROCEDURE — 3061F PR NEG MICROALBUMINURIA RESULT DOCUMENTED/REVIEW: ICD-10-PCS | Mod: CPTII,,, | Performed by: NURSE PRACTITIONER

## 2022-08-04 PROCEDURE — 93005 ELECTROCARDIOGRAM TRACING: CPT | Mod: PBBFAC | Performed by: INTERNAL MEDICINE

## 2022-08-04 PROCEDURE — 1160F PR REVIEW ALL MEDS BY PRESCRIBER/CLIN PHARMACIST DOCUMENTED: ICD-10-PCS | Mod: CPTII,,, | Performed by: NURSE PRACTITIONER

## 2022-08-04 PROCEDURE — 1160F RVW MEDS BY RX/DR IN RCRD: CPT | Mod: CPTII,,, | Performed by: NURSE PRACTITIONER

## 2022-08-04 PROCEDURE — 3044F PR MOST RECENT HEMOGLOBIN A1C LEVEL <7.0%: ICD-10-PCS | Mod: CPTII,,, | Performed by: NURSE PRACTITIONER

## 2022-08-04 PROCEDURE — 3074F SYST BP LT 130 MM HG: CPT | Mod: CPTII,,, | Performed by: NURSE PRACTITIONER

## 2022-08-04 PROCEDURE — 99214 PR OFFICE/OUTPT VISIT, EST, LEVL IV, 30-39 MIN: ICD-10-PCS | Mod: S$PBB,,, | Performed by: NURSE PRACTITIONER

## 2022-08-04 NOTE — PROGRESS NOTES
[facility] note        DATE OF SERVICE: 2022       PCP: CHANDU Concepcion      CHIEF COMPLAINT:   Chief Complaint   Patient presents with    Follow-up     Patient denies any cardiac complaints today.        HISTORY OF PRESENT ILLNESS:  Hailey Duval is a 56 y.o. female with a PMH of   Past Medical History:   Diagnosis Date    Anemia     Anemia, vitamin B12 deficiency     Chronic idiopathic constipation     chronic kidney disease     Depression     Diabetes mellitus, type 2     Dysphagia     GERD (gastroesophageal reflux disease)     Hyperlipidemia     Hypertension     Left ventricular hypertrophy     Nonrheumatic tricuspid (valve) insufficiency     Obese     Osteoarthritis of knee, unspecified     Ulcer of heel and midfoot      who presents for routine follow up.  Chief Complaint   Patient presents with    Follow-up     Patient denies any cardiac complaints today.            PAST MEDICAL HISTORY:  Past Medical History:   Diagnosis Date    Anemia     Anemia, vitamin B12 deficiency     Chronic idiopathic constipation     chronic kidney disease     Depression     Diabetes mellitus, type 2     Dysphagia     GERD (gastroesophageal reflux disease)     Hyperlipidemia     Hypertension     Left ventricular hypertrophy     Nonrheumatic tricuspid (valve) insufficiency     Obese     Osteoarthritis of knee, unspecified     Ulcer of heel and midfoot        PAST SURGICAL HISTORY:  Past Surgical History:   Procedure Laterality Date     SECTION      FOOT SURGERY      JOINT REPLACEMENT Right     Hip    OTHER SURGICAL HISTORY      Foot and Toe surgery procedure screws in right foot        SOCIAL HISTORY:  Social History     Socioeconomic History    Marital status: Single   Tobacco Use    Smoking status: Never Smoker    Smokeless tobacco: Never Used   Substance and Sexual Activity    Alcohol use: Never    Drug use: Yes     Types: Marijuana     Comment:  per pt stoppled  crack x20yrs      Sexual activity: Yes     Social Determinants of Health     Financial Resource Strain: Low Risk     Difficulty of Paying Living Expenses: Not hard at all   Food Insecurity: No Food Insecurity    Worried About Running Out of Food in the Last Year: Never true    Ran Out of Food in the Last Year: Never true   Transportation Needs: No Transportation Needs    Lack of Transportation (Medical): No    Lack of Transportation (Non-Medical): No   Physical Activity: Inactive    Days of Exercise per Week: 0 days    Minutes of Exercise per Session: 0 min   Stress: No Stress Concern Present    Feeling of Stress : Not at all   Social Connections: Moderately Integrated    Frequency of Communication with Friends and Family: Once a week    Frequency of Social Gatherings with Friends and Family: Once a week    Attends Muslim Services: 1 to 4 times per year    Active Member of Clubs or Organizations: Yes    Attends Club or Organization Meetings: 1 to 4 times per year    Marital Status:    Housing Stability: Unknown    Unable to Pay for Housing in the Last Year: No    Unstable Housing in the Last Year: No       FAMILY HISTORY:  Family History   Problem Relation Age of Onset    Diabetes Mother     Hypertension Mother     Hypertension Father     Glaucoma Other     Heart disease Other     Hyperlipidemia Other     Breast cancer Sister          ALLERGIES:  Review of patient's allergies indicates:  No Known Allergies     MEDICATIONS:    Current Outpatient Medications:     alcohol swabs (BD ALCOHOL SWABS) PadM, Use as directed, Disp: 300 each, Rfl: 4    ascorbic acid, vitamin C, (VITAMIN C) 1000 MG tablet, Take 1 tablet (1,000 mg total) by mouth once daily., Disp: 28 tablet, Rfl: 0    aspirin (ECOTRIN) 81 MG EC tablet, Take 1 tablet (81 mg total) by mouth once daily., Disp: 90 tablet, Rfl: 0    azithromycin (Z-JAMES) 250 MG tablet, Take 2 tablets by mouth on day 1; Take 1 tablet by mouth on  days 2-5, Disp: 6 tablet, Rfl: 0    blood sugar diagnostic (ACCU-CHEK GUIDE TEST STRIPS) Strp, Use as directed, Disp: 100 strip, Rfl: 0    blood-glucose meter (ACCU-CHEK GUIDE GLUCOSE METER) Misc, Use as directed, Disp: 1 each, Rfl: 3    cariprazine (VRAYLAR) 1.5 mg Cap, Take 1 capsule (1.5 mg total) by mouth once daily., Disp: 90 capsule, Rfl: 1    cetirizine (ZYRTEC) 10 MG tablet, Take 1 tablet (10 mg total) by mouth once daily., Disp: 30 tablet, Rfl: 0    diclofenac sodium (VOLTAREN) 1 % Gel, Apply 2 g topically 4 (four) times daily., Disp: 200 g, Rfl: 0    ergocalciferol (ERGOCALCIFEROL) 50,000 unit Cap, Take 1 capsule (50,000 Units total) by mouth every 7 days., Disp: 90 capsule, Rfl: 0    esomeprazole (NEXIUM) 40 MG capsule, Take 1 capsule (40 mg total) by mouth once daily., Disp: 90 capsule, Rfl: 1    glipiZIDE (GLUCOTROL) 5 MG tablet, Take 1 tablet (5 mg total) by mouth 2 (two) times daily with meals., Disp: 180 tablet, Rfl: 0    hydroCHLOROthiazide (HYDRODIURIL) 25 MG tablet, Take 1 tablet (25 mg total) by mouth once daily., Disp: 90 tablet, Rfl: 0    HYDROcodone-acetaminophen (NORCO)  mg per tablet, Take 1 tablet by mouth daily as needed. , Disp: , Rfl:     lancets (ACCU-CHEK SOFTCLIX LANCETS) Misc, Use three times daily, Disp: 100 each, Rfl: 0    lancets (ONETOUCH DELICA LANCETS) 33 gauge Misc, 90 lancets by Misc.(Non-Drug; Combo Route) route 3 (three) times daily., Disp: 90 each, Rfl: 11    lovastatin (MEVACOR) 40 MG tablet, Take 1 tablet (40 mg total) by mouth every evening., Disp: 90 tablet, Rfl: 0    metFORMIN (GLUCOPHAGE) 1000 MG tablet, Take 1 tablet (1,000 mg total) by mouth 2 (two) times daily., Disp: 60 tablet, Rfl: 0    semaglutide (OZEMPIC) 1 mg/dose (4 mg/3 mL), Inject 1 mg into the skin every 7 days., Disp: 3 pen, Rfl: 1    semaglutide 2 mg/dose (8 mg/3 mL) PnIj, Inject 2 mg into the skin every 7 days., Disp: 9 mL, Rfl: 1    traZODone (DESYREL) 100 MG tablet, Take 1  "tablet (100 mg total) by mouth nightly as needed for Insomnia., Disp: 90 tablet, Rfl: 0    zinc gluconate 50 mg tablet, Take 50 mg by mouth once daily., Disp: , Rfl:     zinc sulfate 50 mg zinc (220 mg) Tab tablet, Take 1 tablet (220 mg total) by mouth once daily., Disp: 20 tablet, Rfl: 0  Medications have been reviewed but not reconciled. She did not bring her meds today.    PHYSICAL EXAM:  /60 (BP Location: Left arm, Patient Position: Sitting)   Pulse 76   Ht 5' 1" (1.549 m)   Wt 88.5 kg (195 lb)   SpO2 99%   BMI 36.84 kg/m²   Wt Readings from Last 3 Encounters:   08/04/22 88.5 kg (195 lb)   05/12/22 91.6 kg (202 lb)   05/09/22 91.2 kg (201 lb)      Body mass index is 36.84 kg/m².    Physical Exam  Vitals and nursing note reviewed.   Constitutional:       Appearance: Normal appearance. She is obese.   HENT:      Head: Normocephalic and atraumatic.   Eyes:      Pupils: Pupils are equal, round, and reactive to light.   Neck:      Vascular: No carotid bruit.   Cardiovascular:      Rate and Rhythm: Normal rate and regular rhythm.      Pulses: Normal pulses.      Heart sounds: Normal heart sounds.   Pulmonary:      Effort: Pulmonary effort is normal.      Breath sounds: Normal breath sounds.   Abdominal:      General: Bowel sounds are normal.      Palpations: Abdomen is soft.   Musculoskeletal:      Cervical back: Neck supple.      Right lower leg: No edema.      Left lower leg: No edema.   Skin:     General: Skin is warm and dry.      Capillary Refill: Capillary refill takes less than 2 seconds.   Neurological:      General: No focal deficit present.      Mental Status: She is alert and oriented to person, place, and time.   Psychiatric:         Mood and Affect: Mood normal.         Behavior: Behavior normal.         LABS REVIEWED:  Lab Results   Component Value Date    WBC 7.73 05/04/2022    RBC 4.60 05/04/2022    HGB 11.6 (L) 05/04/2022    HCT 38.4 05/04/2022    MCV 83.5 05/04/2022    MCH 25.2 (L) " 05/04/2022    MCHC 30.2 (L) 05/04/2022    RDW 16.9 (H) 05/04/2022     05/04/2022    MPV 11.6 05/04/2022    NRBC 0.0 05/04/2022     Lab Results   Component Value Date     05/04/2022    K 3.9 05/04/2022     05/04/2022    CO2 27 05/04/2022    BUN 17 05/04/2022     Lab Results   Component Value Date    AST 9 (L) 05/04/2022    ALT 24 05/04/2022     Lab Results   Component Value Date     (H) 05/04/2022    HGBA1C 6.3 05/04/2022     Lab Results   Component Value Date    CHOL 166 05/04/2022    HDL 65 (H) 05/04/2022    TRIG 84 05/04/2022    CHOLHDL 2.6 05/04/2022       CARDIAC STUDIES REVIEWED:EKG: RSR with HR 67 BPM; NSTWA; no significant change from EKG done5/18/2021         ASSESSMENT:   Patient Active Problem List   Diagnosis    Type 2 diabetes mellitus with hyperglycemia, without long-term current use of insulin    Essential hypertension    Hyperlipidemia associated with type 2 diabetes mellitus    Gastroesophageal reflux disease    B12 deficiency    Vitamin D deficiency    Irritable bowel syndrome    Encounter for Papanicolaou smear of cervix    Screening for viral disease    BMI 45.0-49.9, adult    Acute cystitis with hematuria    Abdominal pain    HPV (human papilloma virus) infection    Fatigue    Need for hepatitis C screening test    Encounter for screening for HIV    Bipolar depression    BMI 38.0-38.9,adult    BMI 37.0-37.9, adult    Encounter for preventive health examination    Overweight    Type 2 diabetes mellitus without complication, without long-term current use of insulin    COVID    Knee pain    Tricompartment osteoarthritis of left knee            Problem List Items Addressed This Visit    None     Visit Diagnoses     Hypertension, unspecified type    -  Primary    Relevant Orders    EKG 12-lead           PLAN: continue diet/weight loss  Orders Placed This Encounter   Procedures    EKG 12-lead     Standing Status:   Future     Number of Occurrences:    1     Standing Expiration Date:   8/4/2023      RTC: 6 months

## 2022-08-08 PROBLEM — Z00.00 ENCOUNTER FOR PREVENTIVE HEALTH EXAMINATION: Status: RESOLVED | Noted: 2022-05-09 | Resolved: 2022-08-08

## 2022-08-10 ENCOUNTER — OFFICE VISIT (OUTPATIENT)
Dept: FAMILY MEDICINE | Facility: CLINIC | Age: 57
End: 2022-08-10
Payer: COMMERCIAL

## 2022-08-10 VITALS
HEIGHT: 61 IN | WEIGHT: 194 LBS | RESPIRATION RATE: 18 BRPM | DIASTOLIC BLOOD PRESSURE: 81 MMHG | TEMPERATURE: 98 F | BODY MASS INDEX: 36.63 KG/M2 | HEART RATE: 78 BPM | SYSTOLIC BLOOD PRESSURE: 120 MMHG

## 2022-08-10 DIAGNOSIS — E11.69 HYPERLIPIDEMIA ASSOCIATED WITH TYPE 2 DIABETES MELLITUS: Primary | ICD-10-CM

## 2022-08-10 DIAGNOSIS — E11.65 TYPE 2 DIABETES MELLITUS WITH HYPERGLYCEMIA, WITHOUT LONG-TERM CURRENT USE OF INSULIN: ICD-10-CM

## 2022-08-10 DIAGNOSIS — I10 ESSENTIAL HYPERTENSION: ICD-10-CM

## 2022-08-10 DIAGNOSIS — U07.1 COVID-19: ICD-10-CM

## 2022-08-10 DIAGNOSIS — E55.9 VITAMIN D DEFICIENCY: ICD-10-CM

## 2022-08-10 DIAGNOSIS — R73.01 IMPAIRED FASTING GLUCOSE: ICD-10-CM

## 2022-08-10 DIAGNOSIS — E78.5 HYPERLIPIDEMIA ASSOCIATED WITH TYPE 2 DIABETES MELLITUS: Primary | ICD-10-CM

## 2022-08-10 DIAGNOSIS — F31.9 BIPOLAR DEPRESSION: ICD-10-CM

## 2022-08-10 DIAGNOSIS — E11.9 TYPE 2 DIABETES MELLITUS WITHOUT COMPLICATION, WITHOUT LONG-TERM CURRENT USE OF INSULIN: ICD-10-CM

## 2022-08-10 LAB
ALBUMIN SERPL BCP-MCNC: 3.3 G/DL (ref 3.5–5)
ALBUMIN/GLOB SERPL: 0.9 {RATIO}
ALP SERPL-CCNC: 115 U/L (ref 46–118)
ALT SERPL W P-5'-P-CCNC: 20 U/L (ref 13–56)
ANION GAP SERPL CALCULATED.3IONS-SCNC: 13 MMOL/L (ref 7–16)
AST SERPL W P-5'-P-CCNC: 16 U/L (ref 15–37)
BILIRUB SERPL-MCNC: 0.3 MG/DL (ref 0–1.2)
BUN SERPL-MCNC: 18 MG/DL (ref 7–18)
BUN/CREAT SERPL: 19 (ref 6–20)
CALCIUM SERPL-MCNC: 9.2 MG/DL (ref 8.5–10.1)
CHLORIDE SERPL-SCNC: 100 MMOL/L (ref 98–107)
CHOLEST SERPL-MCNC: 197 MG/DL (ref 0–200)
CHOLEST/HDLC SERPL: 3.1 {RATIO}
CO2 SERPL-SCNC: 25 MMOL/L (ref 21–32)
CREAT SERPL-MCNC: 0.96 MG/DL (ref 0.55–1.02)
CREAT UR-MCNC: 157 MG/DL (ref 28–219)
EGFR (NO RACE VARIABLE) (RUSH/TITUS): 70 ML/MIN/1.73M²
EST. AVERAGE GLUCOSE BLD GHB EST-MCNC: 117 MG/DL
GLOBULIN SER-MCNC: 3.8 G/DL (ref 2–4)
GLUCOSE SERPL-MCNC: 170 MG/DL (ref 74–106)
HBA1C MFR BLD HPLC: 6.1 % (ref 4.5–6.6)
HDLC SERPL-MCNC: 64 MG/DL (ref 40–60)
LDLC SERPL CALC-MCNC: 118 MG/DL
LDLC/HDLC SERPL: 1.8 {RATIO}
MICROALBUMIN UR-MCNC: 1 MG/DL (ref 0–2.8)
MICROALBUMIN/CREAT RATIO PNL UR: 6.4 MG/G (ref 0–30)
NONHDLC SERPL-MCNC: 133 MG/DL
POTASSIUM SERPL-SCNC: 4.1 MMOL/L (ref 3.5–5.1)
PROT SERPL-MCNC: 7.1 G/DL (ref 6.4–8.2)
SODIUM SERPL-SCNC: 134 MMOL/L (ref 136–145)
TRIGL SERPL-MCNC: 74 MG/DL (ref 35–150)
VLDLC SERPL-MCNC: 15 MG/DL

## 2022-08-10 PROCEDURE — 1159F PR MEDICATION LIST DOCUMENTED IN MEDICAL RECORD: ICD-10-PCS | Mod: CPTII,,, | Performed by: NURSE PRACTITIONER

## 2022-08-10 PROCEDURE — 1159F MED LIST DOCD IN RCRD: CPT | Mod: CPTII,,, | Performed by: NURSE PRACTITIONER

## 2022-08-10 PROCEDURE — 3074F SYST BP LT 130 MM HG: CPT | Mod: CPTII,,, | Performed by: NURSE PRACTITIONER

## 2022-08-10 PROCEDURE — 99213 PR OFFICE/OUTPT VISIT, EST, LEVL III, 20-29 MIN: ICD-10-PCS | Mod: ,,, | Performed by: NURSE PRACTITIONER

## 2022-08-10 PROCEDURE — 80061 LIPID PANEL: CPT | Mod: ,,, | Performed by: CLINICAL MEDICAL LABORATORY

## 2022-08-10 PROCEDURE — 3044F PR MOST RECENT HEMOGLOBIN A1C LEVEL <7.0%: ICD-10-PCS | Mod: CPTII,,, | Performed by: NURSE PRACTITIONER

## 2022-08-10 PROCEDURE — 3079F DIAST BP 80-89 MM HG: CPT | Mod: CPTII,,, | Performed by: NURSE PRACTITIONER

## 2022-08-10 PROCEDURE — 3008F PR BODY MASS INDEX (BMI) DOCUMENTED: ICD-10-PCS | Mod: CPTII,,, | Performed by: NURSE PRACTITIONER

## 2022-08-10 PROCEDURE — 82570 ASSAY OF URINE CREATININE: CPT | Mod: ,,, | Performed by: CLINICAL MEDICAL LABORATORY

## 2022-08-10 PROCEDURE — 83036 HEMOGLOBIN A1C: ICD-10-PCS | Mod: ,,, | Performed by: CLINICAL MEDICAL LABORATORY

## 2022-08-10 PROCEDURE — 3008F BODY MASS INDEX DOCD: CPT | Mod: CPTII,,, | Performed by: NURSE PRACTITIONER

## 2022-08-10 PROCEDURE — 3079F PR MOST RECENT DIASTOLIC BLOOD PRESSURE 80-89 MM HG: ICD-10-PCS | Mod: CPTII,,, | Performed by: NURSE PRACTITIONER

## 2022-08-10 PROCEDURE — 1160F PR REVIEW ALL MEDS BY PRESCRIBER/CLIN PHARMACIST DOCUMENTED: ICD-10-PCS | Mod: CPTII,,, | Performed by: NURSE PRACTITIONER

## 2022-08-10 PROCEDURE — 80053 COMPREHENSIVE METABOLIC PANEL: ICD-10-PCS | Mod: ,,, | Performed by: CLINICAL MEDICAL LABORATORY

## 2022-08-10 PROCEDURE — 3074F PR MOST RECENT SYSTOLIC BLOOD PRESSURE < 130 MM HG: ICD-10-PCS | Mod: CPTII,,, | Performed by: NURSE PRACTITIONER

## 2022-08-10 PROCEDURE — 80053 COMPREHEN METABOLIC PANEL: CPT | Mod: ,,, | Performed by: CLINICAL MEDICAL LABORATORY

## 2022-08-10 PROCEDURE — 3061F NEG MICROALBUMINURIA REV: CPT | Mod: CPTII,,, | Performed by: NURSE PRACTITIONER

## 2022-08-10 PROCEDURE — 82570 MICROALBUMIN / CREATININE RATIO URINE: ICD-10-PCS | Mod: ,,, | Performed by: CLINICAL MEDICAL LABORATORY

## 2022-08-10 PROCEDURE — 3066F NEPHROPATHY DOC TX: CPT | Mod: CPTII,,, | Performed by: NURSE PRACTITIONER

## 2022-08-10 PROCEDURE — 83036 HEMOGLOBIN GLYCOSYLATED A1C: CPT | Mod: ,,, | Performed by: CLINICAL MEDICAL LABORATORY

## 2022-08-10 PROCEDURE — 3061F PR NEG MICROALBUMINURIA RESULT DOCUMENTED/REVIEW: ICD-10-PCS | Mod: CPTII,,, | Performed by: NURSE PRACTITIONER

## 2022-08-10 PROCEDURE — 80061 LIPID PANEL: ICD-10-PCS | Mod: ,,, | Performed by: CLINICAL MEDICAL LABORATORY

## 2022-08-10 PROCEDURE — 82043 UR ALBUMIN QUANTITATIVE: CPT | Mod: ,,, | Performed by: CLINICAL MEDICAL LABORATORY

## 2022-08-10 PROCEDURE — 3044F HG A1C LEVEL LT 7.0%: CPT | Mod: CPTII,,, | Performed by: NURSE PRACTITIONER

## 2022-08-10 PROCEDURE — 99213 OFFICE O/P EST LOW 20 MIN: CPT | Mod: ,,, | Performed by: NURSE PRACTITIONER

## 2022-08-10 PROCEDURE — 1160F RVW MEDS BY RX/DR IN RCRD: CPT | Mod: CPTII,,, | Performed by: NURSE PRACTITIONER

## 2022-08-10 PROCEDURE — 3066F PR DOCUMENTATION OF TREATMENT FOR NEPHROPATHY: ICD-10-PCS | Mod: CPTII,,, | Performed by: NURSE PRACTITIONER

## 2022-08-10 PROCEDURE — 82043 MICROALBUMIN / CREATININE RATIO URINE: ICD-10-PCS | Mod: ,,, | Performed by: CLINICAL MEDICAL LABORATORY

## 2022-08-10 RX ORDER — ERGOCALCIFEROL 1.25 MG/1
50000 CAPSULE ORAL
Qty: 90 CAPSULE | Refills: 3 | Status: SHIPPED | OUTPATIENT
Start: 2022-08-10 | End: 2023-09-27 | Stop reason: SDUPTHER

## 2022-08-10 RX ORDER — CARIPRAZINE 1.5 MG/1
1.5 CAPSULE, GELATIN COATED ORAL DAILY
Qty: 90 CAPSULE | Refills: 3 | Status: SHIPPED | OUTPATIENT
Start: 2022-08-10 | End: 2022-11-23 | Stop reason: SDUPTHER

## 2022-08-10 RX ORDER — METFORMIN HYDROCHLORIDE 1000 MG/1
1000 TABLET ORAL 2 TIMES DAILY
Qty: 60 TABLET | Refills: 3 | Status: SHIPPED | OUTPATIENT
Start: 2022-08-10 | End: 2022-11-23 | Stop reason: SDUPTHER

## 2022-08-10 RX ORDER — LANCETS 33 GAUGE
90 EACH MISCELLANEOUS 3 TIMES DAILY
Qty: 90 EACH | Refills: 11 | Status: SHIPPED | OUTPATIENT
Start: 2022-08-10 | End: 2022-11-23 | Stop reason: SDUPTHER

## 2022-08-10 RX ORDER — LOVASTATIN 40 MG/1
40 TABLET ORAL NIGHTLY
Qty: 90 TABLET | Refills: 3 | Status: SHIPPED | OUTPATIENT
Start: 2022-08-10 | End: 2022-11-23 | Stop reason: SDUPTHER

## 2022-08-10 RX ORDER — GLIPIZIDE 5 MG/1
5 TABLET ORAL 2 TIMES DAILY WITH MEALS
Qty: 180 TABLET | Refills: 3 | Status: SHIPPED | OUTPATIENT
Start: 2022-08-10 | End: 2022-11-23 | Stop reason: SDUPTHER

## 2022-08-10 RX ORDER — ESOMEPRAZOLE MAGNESIUM 40 MG/1
40 CAPSULE, DELAYED RELEASE ORAL DAILY
Qty: 90 CAPSULE | Refills: 3 | Status: SHIPPED | OUTPATIENT
Start: 2022-08-10 | End: 2022-11-23 | Stop reason: SDUPTHER

## 2022-08-10 RX ORDER — ASPIRIN 81 MG/1
81 TABLET ORAL DAILY
Qty: 90 TABLET | Refills: 3 | Status: SHIPPED | OUTPATIENT
Start: 2022-08-10 | End: 2022-11-23 | Stop reason: SDUPTHER

## 2022-08-10 RX ORDER — HYDROCHLOROTHIAZIDE 25 MG/1
25 TABLET ORAL DAILY
Qty: 90 TABLET | Refills: 3 | Status: SHIPPED | OUTPATIENT
Start: 2022-08-10 | End: 2022-11-23 | Stop reason: SDUPTHER

## 2022-08-10 RX ORDER — TRAZODONE HYDROCHLORIDE 100 MG/1
100 TABLET ORAL NIGHTLY PRN
Qty: 90 TABLET | Refills: 3 | Status: SHIPPED | OUTPATIENT
Start: 2022-08-10 | End: 2023-09-27 | Stop reason: SDUPTHER

## 2022-08-10 NOTE — ASSESSMENT & PLAN NOTE
Lab Results   Component Value Date    CHOL 166 05/04/2022    CHOL 179 02/01/2022    CHOL 164 11/16/2021    TRIG 84 05/04/2022    TRIG 80 02/01/2022    TRIG 99 11/16/2021    HDL 65 (H) 05/04/2022    HDL 74 (H) 02/01/2022    HDL 66 (H) 11/16/2021    LDLCALC 84 05/04/2022    LDLCALC 89 02/01/2022    LDLCALC 78 11/16/2021    CHOLHDL 2.6 05/04/2022    CHOLHDL 2.4 02/01/2022    CHOLHDL 2.5 11/16/2021    NONHDLCHOL 101 05/04/2022    NONHDLCHOL 105 02/01/2022    NONHDLCHOL 98 11/16/2021

## 2022-08-10 NOTE — PROGRESS NOTES
CHANDU Concepcion   [address] [place] [address]     PATIENT NAME: Hailey Duval  : 1965  DATE: 8/10/22  MRN: [id number]      Billing Provider: CHANDU Concepcion  Level of Service: [place] OFFICE/OUTPT VISIT, EST, LEVL III, 20-29 MIN  Patient PCP Information     Provider PCP Type    CHANDU Concepcion General          Reason for Visit / Chief Complaint: Follow-up (3 month follow up)       Update PCP  Update Chief Complaint         History of Present Illness / Problem Focused Workflow     Hailey Duval presents to the clinic with Follow-up (3 month follow up)     HPI    Review of Systems     Review of Systems   Constitutional: Negative for chills, diaphoresis, fatigue and fever.   HENT: Negative for nasal congestion, dental problem, ear pain and postnasal drip.    Eyes: Negative for visual disturbance.   Respiratory: Negative for shortness of breath and wheezing.    Cardiovascular: Negative for chest pain and palpitations.   Gastrointestinal: Negative for abdominal distention and abdominal pain.   Endocrine: Negative for cold intolerance and heat intolerance.   Genitourinary: Negative for enuresis.   Musculoskeletal: Negative for neck stiffness.   Integumentary:  Negative for pallor and rash.   Neurological: Negative for dizziness, seizures, speech difficulty and weakness.   Psychiatric/Behavioral: Negative for agitation.        Medical / Social / Family History     Past Medical History:   Diagnosis Date    Anemia     Anemia, vitamin B12 deficiency     Chronic idiopathic constipation     chronic kidney disease     Depression     Diabetes mellitus, type 2     Dysphagia     GERD (gastroesophageal reflux disease)     Hyperlipidemia     Hypertension     Left ventricular hypertrophy     Nonrheumatic tricuspid (valve) insufficiency     Obese     Osteoarthritis of knee, unspecified     Ulcer of heel and midfoot        Past Surgical History:   Procedure Laterality Date      "SECTION      FOOT SURGERY      JOINT REPLACEMENT Right     Hip    OTHER SURGICAL HISTORY      Foot and Toe surgery procedure screws in right foot        Social History  MsDc  reports that she has never smoked. She has never used smokeless tobacco. She reports current drug use. Drug: Marijuana. She reports that she does not drink alcohol.    Family History  Ms.'s family history includes Breast cancer in her sister; Diabetes in her mother; Glaucoma in her other; Heart disease in her other; Hyperlipidemia in her other; Hypertension in her father and mother.    PHQ9 5/9/2022   Total Score 2           Medications and Allergies     Medications  No outpatient medications have been marked as taking for the 8/10/22 encounter (Office Visit) with CHANDU Concepcion.       Allergies  Review of patient's allergies indicates:  No Known Allergies    Physical Examination   /81 (BP Location: Left arm, Patient Position: Sitting, BP Method: Medium (Automatic))   Pulse 78   Temp 98.1 °F (36.7 °C) (Oral)   Resp 18   Ht 5' 1" (1.549 m)   Wt 88 kg (194 lb)   BMI 36.66 kg/m²   Physical Exam  Vitals and nursing note reviewed.   Constitutional:       Appearance: Normal appearance.   HENT:      Head: Normocephalic and atraumatic.      Right Ear: External ear normal.      Left Ear: External ear normal.      Nose: Nose normal.      Mouth/Throat:      Mouth: Mucous membranes are moist.      Pharynx: Oropharynx is clear.   Eyes:      Pupils: Pupils are equal, round, and reactive to light.   Cardiovascular:      Rate and Rhythm: Normal rate and regular rhythm.      Pulses: Normal pulses.      Heart sounds: Normal heart sounds. No murmur heard.    No gallop.   Pulmonary:      Effort: Pulmonary effort is normal.      Breath sounds: Normal breath sounds. No wheezing or rales.   Abdominal:      General: Abdomen is flat. Bowel sounds are normal. There is no distension.      Palpations: Abdomen is soft.      Tenderness: There is no " abdominal tenderness.   Musculoskeletal:         General: Normal range of motion.      Cervical back: Normal range of motion and neck supple.   Skin:     General: Skin is warm and dry.      Capillary Refill: Capillary refill takes less than 2 seconds.   Neurological:      General: No focal deficit present.      Mental Status: She is alert and oriented to person, place, and time.   Psychiatric:         Mood and Affect: Mood normal.         Behavior: Behavior normal.          Assessment and Plan (including Health Maintenance)      Problem List  [employer]  Document Outside HM   :    Plan:         Health Maintenance Due   Topic Date Due    Pneumococcal Vaccines (Age 0-64) (2 - PCV) 01/10/2020    Mammogram  09/07/2022    Foot Exam  09/11/2022       Problem List Items Addressed This Visit        Psychiatric    Bipolar depression    Relevant Medications    cariprazine (VRAYLAR) 1.5 mg Cap       Cardiac/Vascular    Essential hypertension    Relevant Medications    aspirin (ECOTRIN) 81 MG EC tablet    hydroCHLOROthiazide (HYDRODIURIL) 25 MG tablet    Other Relevant Orders    Comprehensive Metabolic Panel    Microalbumin/Creatinine Ratio, Urine    Hyperlipidemia associated with type 2 diabetes mellitus - Primary    Current Assessment & Plan       Lab Results   Component Value Date    CHOL 166 05/04/2022    CHOL 179 02/01/2022    CHOL 164 11/16/2021    TRIG 84 05/04/2022    TRIG 80 02/01/2022    TRIG 99 11/16/2021    HDL 65 (H) 05/04/2022    HDL 74 (H) 02/01/2022    HDL 66 (H) 11/16/2021    LDLCALC 84 05/04/2022    LDLCALC 89 02/01/2022    LDLCALC 78 11/16/2021    CHOLHDL 2.6 05/04/2022    CHOLHDL 2.4 02/01/2022    CHOLHDL 2.5 11/16/2021    NONHDLCHOL 101 05/04/2022    NONHDLCHOL 105 02/01/2022    NONHDLCHOL 98 11/16/2021              Relevant Medications    lovastatin (MEVACOR) 40 MG tablet    metFORMIN (GLUCOPHAGE) 1000 MG tablet    semaglutide 2 mg/dose (8 mg/3 mL) PnIj    glipiZIDE (GLUCOTROL) 5 MG tablet    Other  Relevant Orders    Lipid Panel       Endocrine    Type 2 diabetes mellitus with hyperglycemia, without long-term current use of insulin    Current Assessment & Plan         Lab Results   Component Value Date    HGBA1C 6.3 05/04/2022    HGBA1C 7.1 (H) 02/01/2022    HGBA1C 7.5 (H) 11/16/2021    HGBA1C 7.0 (H) 08/17/2021    HGBA1C 6.7 (H) 05/17/2021       Wt Readings from Last 3 Encounters:   08/10/22 88 kg (194 lb)   08/04/22 88.5 kg (195 lb)   05/12/22 91.6 kg (202 lb)       Body mass index is 36.66 kg/m².    No results found for requested labs within last 720 hours.                        Relevant Medications    metFORMIN (GLUCOPHAGE) 1000 MG tablet    semaglutide 2 mg/dose (8 mg/3 mL) PnIj    glipiZIDE (GLUCOTROL) 5 MG tablet    Vitamin D deficiency    Relevant Medications    ergocalciferol (ERGOCALCIFEROL) 50,000 unit Cap    Type 2 diabetes mellitus without complication, without long-term current use of insulin    Relevant Medications    metFORMIN (GLUCOPHAGE) 1000 MG tablet    semaglutide 2 mg/dose (8 mg/3 mL) PnIj    glipiZIDE (GLUCOTROL) 5 MG tablet    Impaired fasting glucose    Relevant Orders    Hemoglobin A1C      Other Visit Diagnoses     COVID-19              Health Maintenance Topics with due status: Not Due       Topic Last Completion Date    Influenza Vaccine 09/15/2021    Cervical Cancer Screening 09/15/2021    Eye Exam 11/18/2021    Diabetes Urine Screening 05/04/2022    Lipid Panel 05/04/2022    Hemoglobin A1c 05/04/2022    Low Dose Statin 05/09/2022       Future Appointments   Date Time Provider Department Center   9/12/2022  7:30 AM [facility] MAMMO1 [facility] MM[facility] [place]   2/9/2023  9:00 AM ALEX Jackson [facility] CARD [facility]   5/10/2023  9:00 AM AWV NURSE, [facility] FAMILY MEDICINE [facility] [address] [place]        Follow up in about 3 months (around 11/10/2022), or if symptoms worsen or fail to improve.        Signature:  Valery Whitfield, CHARLIEP      [address] [facility] [place] [address]    Date of encounter: 8/10/22

## 2022-08-10 NOTE — ASSESSMENT & PLAN NOTE
Lab Results   Component Value Date    HGBA1C 6.3 05/04/2022    HGBA1C 7.1 (H) 02/01/2022    HGBA1C 7.5 (H) 11/16/2021    HGBA1C 7.0 (H) 08/17/2021    HGBA1C 6.7 (H) 05/17/2021       Wt Readings from Last 3 Encounters:   08/10/22 88 kg (194 lb)   08/04/22 88.5 kg (195 lb)   05/12/22 91.6 kg (202 lb)       Body mass index is 36.66 kg/m².    No results found for requested labs within last 720 hours.

## 2022-09-12 ENCOUNTER — HOSPITAL ENCOUNTER (OUTPATIENT)
Dept: RADIOLOGY | Facility: HOSPITAL | Age: 57
Discharge: HOME | End: 2022-09-12
Attending: NURSE PRACTITIONER
Payer: MEDICARE

## 2022-09-12 DIAGNOSIS — Z12.31 ENCOUNTER FOR SCREENING MAMMOGRAM FOR BREAST CANCER: ICD-10-CM

## 2022-09-12 PROCEDURE — 77067 SCR MAMMO BI INCL CAD: CPT | Mod: TC

## 2022-09-12 NOTE — TELEPHONE ENCOUNTER
----- Message from Keily Hernadez sent at 9/12/2022  9:58 AM CDT -----  She called and said that the drug store said that her Ozempic is on Back order and wanted to know if we had any Samples because she has been missing her shot.

## 2022-09-14 ENCOUNTER — TELEPHONE (OUTPATIENT)
Dept: FAMILY MEDICINE | Facility: CLINIC | Age: 57
End: 2022-09-14
Payer: MEDICARE

## 2022-09-14 RX ORDER — SEMAGLUTIDE 2.68 MG/ML
2 INJECTION, SOLUTION SUBCUTANEOUS
Qty: 3 ML | Refills: 3 | Status: SHIPPED | OUTPATIENT
Start: 2022-09-14 | End: 2022-10-31 | Stop reason: SDUPTHER

## 2022-09-14 NOTE — TELEPHONE ENCOUNTER
----- Message from Adri Hardin sent at 9/13/2022  3:52 PM CDT -----  Please call patient regarding getting her ozempic 2 MG sent to [facility] in [place] instead on the 1 MG because she was on the 2 MG originally. [phone].

## 2022-11-01 RX ORDER — SEMAGLUTIDE 2.68 MG/ML
2 INJECTION, SOLUTION SUBCUTANEOUS
Qty: 3 ML | Refills: 1 | Status: SHIPPED | OUTPATIENT
Start: 2022-11-01 | End: 2022-11-23 | Stop reason: SDUPTHER

## 2022-11-09 DIAGNOSIS — Z71.89 COMPLEX CARE COORDINATION: ICD-10-CM

## 2022-11-23 ENCOUNTER — OFFICE VISIT (OUTPATIENT)
Dept: FAMILY MEDICINE | Facility: CLINIC | Age: 57
End: 2022-11-23
Payer: MEDICARE

## 2022-11-23 VITALS
RESPIRATION RATE: 18 BRPM | WEIGHT: 193 LBS | DIASTOLIC BLOOD PRESSURE: 85 MMHG | HEIGHT: 61 IN | BODY MASS INDEX: 36.44 KG/M2 | OXYGEN SATURATION: 99 % | SYSTOLIC BLOOD PRESSURE: 114 MMHG | TEMPERATURE: 97 F | HEART RATE: 87 BPM

## 2022-11-23 DIAGNOSIS — I10 ESSENTIAL HYPERTENSION: Primary | ICD-10-CM

## 2022-11-23 DIAGNOSIS — Z23 INFLUENZA VACCINATION ADMINISTERED AT CURRENT VISIT: ICD-10-CM

## 2022-11-23 DIAGNOSIS — E11.69 HYPERLIPIDEMIA ASSOCIATED WITH TYPE 2 DIABETES MELLITUS: ICD-10-CM

## 2022-11-23 DIAGNOSIS — Z23 PNEUMOCOCCAL VACCINATION ADMINISTERED AT CURRENT VISIT: ICD-10-CM

## 2022-11-23 DIAGNOSIS — E11.65 TYPE 2 DIABETES MELLITUS WITH HYPERGLYCEMIA, WITHOUT LONG-TERM CURRENT USE OF INSULIN: ICD-10-CM

## 2022-11-23 DIAGNOSIS — E78.5 HYPERLIPIDEMIA ASSOCIATED WITH TYPE 2 DIABETES MELLITUS: ICD-10-CM

## 2022-11-23 DIAGNOSIS — F31.9 BIPOLAR DEPRESSION: ICD-10-CM

## 2022-11-23 DIAGNOSIS — E11.9 TYPE 2 DIABETES MELLITUS WITHOUT COMPLICATION, WITHOUT LONG-TERM CURRENT USE OF INSULIN: ICD-10-CM

## 2022-11-23 LAB
ALBUMIN SERPL BCP-MCNC: 3.4 G/DL (ref 3.5–5)
ALBUMIN/GLOB SERPL: 0.9 {RATIO}
ALP SERPL-CCNC: 127 U/L (ref 46–118)
ALT SERPL W P-5'-P-CCNC: 22 U/L (ref 13–56)
ANION GAP SERPL CALCULATED.3IONS-SCNC: 13 MMOL/L (ref 7–16)
AST SERPL W P-5'-P-CCNC: 11 U/L (ref 15–37)
BILIRUB SERPL-MCNC: 0.2 MG/DL (ref ?–1.2)
BUN SERPL-MCNC: 21 MG/DL (ref 7–18)
BUN/CREAT SERPL: 22 (ref 6–20)
CALCIUM SERPL-MCNC: 9.5 MG/DL (ref 8.5–10.1)
CHLORIDE SERPL-SCNC: 102 MMOL/L (ref 98–107)
CHOLEST SERPL-MCNC: 205 MG/DL (ref 0–200)
CHOLEST/HDLC SERPL: 2.7 {RATIO}
CO2 SERPL-SCNC: 27 MMOL/L (ref 21–32)
CREAT SERPL-MCNC: 0.94 MG/DL (ref 0.55–1.02)
EGFR (NO RACE VARIABLE) (RUSH/TITUS): 71 ML/MIN/1.73M²
EST. AVERAGE GLUCOSE BLD GHB EST-MCNC: 124 MG/DL
GLOBULIN SER-MCNC: 4 G/DL (ref 2–4)
GLUCOSE SERPL-MCNC: 126 MG/DL (ref 74–106)
HBA1C MFR BLD HPLC: 6.3 % (ref 4.5–6.6)
HDLC SERPL-MCNC: 75 MG/DL (ref 40–60)
LDLC SERPL CALC-MCNC: 108 MG/DL
LDLC/HDLC SERPL: 1.4 {RATIO}
NONHDLC SERPL-MCNC: 130 MG/DL
POTASSIUM SERPL-SCNC: 4.2 MMOL/L (ref 3.5–5.1)
PROT SERPL-MCNC: 7.4 G/DL (ref 6.4–8.2)
SODIUM SERPL-SCNC: 138 MMOL/L (ref 136–145)
TRIGL SERPL-MCNC: 109 MG/DL (ref 35–150)
VLDLC SERPL-MCNC: 22 MG/DL

## 2022-11-23 PROCEDURE — 3044F HG A1C LEVEL LT 7.0%: CPT | Mod: ,,, | Performed by: NURSE PRACTITIONER

## 2022-11-23 PROCEDURE — G0008 FLU VACCINE (QUAD) GREATER THAN OR EQUAL TO 3YO PRESERVATIVE FREE IM: ICD-10-PCS | Mod: ,,, | Performed by: NURSE PRACTITIONER

## 2022-11-23 PROCEDURE — 1159F PR MEDICATION LIST DOCUMENTED IN MEDICAL RECORD: ICD-10-PCS | Mod: ,,, | Performed by: NURSE PRACTITIONER

## 2022-11-23 PROCEDURE — 3044F PR MOST RECENT HEMOGLOBIN A1C LEVEL <7.0%: ICD-10-PCS | Mod: ,,, | Performed by: NURSE PRACTITIONER

## 2022-11-23 PROCEDURE — 80053 COMPREHENSIVE METABOLIC PANEL: ICD-10-PCS | Mod: ,,, | Performed by: CLINICAL MEDICAL LABORATORY

## 2022-11-23 PROCEDURE — 3008F BODY MASS INDEX DOCD: CPT | Mod: ,,, | Performed by: NURSE PRACTITIONER

## 2022-11-23 PROCEDURE — G0009 PNEUMOCOCCAL CONJUGATE VACCINE 20-VALENT: ICD-10-PCS | Mod: ,,, | Performed by: NURSE PRACTITIONER

## 2022-11-23 PROCEDURE — 90677 PCV20 VACCINE IM: CPT | Mod: ,,, | Performed by: NURSE PRACTITIONER

## 2022-11-23 PROCEDURE — 3074F PR MOST RECENT SYSTOLIC BLOOD PRESSURE < 130 MM HG: ICD-10-PCS | Mod: ,,, | Performed by: NURSE PRACTITIONER

## 2022-11-23 PROCEDURE — 80061 LIPID PANEL: CPT | Mod: ,,, | Performed by: CLINICAL MEDICAL LABORATORY

## 2022-11-23 PROCEDURE — 3008F PR BODY MASS INDEX (BMI) DOCUMENTED: ICD-10-PCS | Mod: ,,, | Performed by: NURSE PRACTITIONER

## 2022-11-23 PROCEDURE — 1159F MED LIST DOCD IN RCRD: CPT | Mod: ,,, | Performed by: NURSE PRACTITIONER

## 2022-11-23 PROCEDURE — G0008 ADMIN INFLUENZA VIRUS VAC: HCPCS | Mod: ,,, | Performed by: NURSE PRACTITIONER

## 2022-11-23 PROCEDURE — 83036 HEMOGLOBIN GLYCOSYLATED A1C: CPT | Mod: ,,, | Performed by: CLINICAL MEDICAL LABORATORY

## 2022-11-23 PROCEDURE — 3079F PR MOST RECENT DIASTOLIC BLOOD PRESSURE 80-89 MM HG: ICD-10-PCS | Mod: ,,, | Performed by: NURSE PRACTITIONER

## 2022-11-23 PROCEDURE — 99214 PR OFFICE/OUTPT VISIT, EST, LEVL IV, 30-39 MIN: ICD-10-PCS | Mod: ,,, | Performed by: NURSE PRACTITIONER

## 2022-11-23 PROCEDURE — 1160F PR REVIEW ALL MEDS BY PRESCRIBER/CLIN PHARMACIST DOCUMENTED: ICD-10-PCS | Mod: ,,, | Performed by: NURSE PRACTITIONER

## 2022-11-23 PROCEDURE — 99214 OFFICE O/P EST MOD 30 MIN: CPT | Mod: ,,, | Performed by: NURSE PRACTITIONER

## 2022-11-23 PROCEDURE — 90686 IIV4 VACC NO PRSV 0.5 ML IM: CPT | Mod: ,,, | Performed by: NURSE PRACTITIONER

## 2022-11-23 PROCEDURE — 80053 COMPREHEN METABOLIC PANEL: CPT | Mod: ,,, | Performed by: CLINICAL MEDICAL LABORATORY

## 2022-11-23 PROCEDURE — 3061F NEG MICROALBUMINURIA REV: CPT | Mod: ,,, | Performed by: NURSE PRACTITIONER

## 2022-11-23 PROCEDURE — 90686 FLU VACCINE (QUAD) GREATER THAN OR EQUAL TO 3YO PRESERVATIVE FREE IM: ICD-10-PCS | Mod: ,,, | Performed by: NURSE PRACTITIONER

## 2022-11-23 PROCEDURE — 83036 HEMOGLOBIN A1C: ICD-10-PCS | Mod: ,,, | Performed by: CLINICAL MEDICAL LABORATORY

## 2022-11-23 PROCEDURE — G0009 ADMIN PNEUMOCOCCAL VACCINE: HCPCS | Mod: ,,, | Performed by: NURSE PRACTITIONER

## 2022-11-23 PROCEDURE — 1160F RVW MEDS BY RX/DR IN RCRD: CPT | Mod: ,,, | Performed by: NURSE PRACTITIONER

## 2022-11-23 PROCEDURE — 3061F PR NEG MICROALBUMINURIA RESULT DOCUMENTED/REVIEW: ICD-10-PCS | Mod: ,,, | Performed by: NURSE PRACTITIONER

## 2022-11-23 PROCEDURE — 90677 PNEUMOCOCCAL CONJUGATE VACCINE 20-VALENT: ICD-10-PCS | Mod: ,,, | Performed by: NURSE PRACTITIONER

## 2022-11-23 PROCEDURE — 80061 LIPID PANEL: ICD-10-PCS | Mod: ,,, | Performed by: CLINICAL MEDICAL LABORATORY

## 2022-11-23 PROCEDURE — 3074F SYST BP LT 130 MM HG: CPT | Mod: ,,, | Performed by: NURSE PRACTITIONER

## 2022-11-23 PROCEDURE — 3079F DIAST BP 80-89 MM HG: CPT | Mod: ,,, | Performed by: NURSE PRACTITIONER

## 2022-11-23 PROCEDURE — 3066F NEPHROPATHY DOC TX: CPT | Mod: ,,, | Performed by: NURSE PRACTITIONER

## 2022-11-23 PROCEDURE — 3066F PR DOCUMENTATION OF TREATMENT FOR NEPHROPATHY: ICD-10-PCS | Mod: ,,, | Performed by: NURSE PRACTITIONER

## 2022-11-23 RX ORDER — ASPIRIN 81 MG/1
81 TABLET ORAL DAILY
Qty: 90 TABLET | Refills: 3 | Status: SHIPPED | OUTPATIENT
Start: 2022-11-23 | End: 2023-09-27 | Stop reason: SDUPTHER

## 2022-11-23 RX ORDER — ESOMEPRAZOLE MAGNESIUM 40 MG/1
40 CAPSULE, DELAYED RELEASE ORAL DAILY
Qty: 90 CAPSULE | Refills: 1 | Status: SHIPPED | OUTPATIENT
Start: 2022-11-23 | End: 2023-03-27 | Stop reason: SDUPTHER

## 2022-11-23 RX ORDER — CARIPRAZINE 1.5 MG/1
1.5 CAPSULE, GELATIN COATED ORAL DAILY
Qty: 90 CAPSULE | Refills: 3 | Status: SHIPPED | OUTPATIENT
Start: 2022-11-23 | End: 2023-08-21 | Stop reason: SDUPTHER

## 2022-11-23 RX ORDER — LANCETS
EACH MISCELLANEOUS
Qty: 100 EACH | Refills: 0 | Status: SHIPPED | OUTPATIENT
Start: 2022-11-23 | End: 2022-11-23

## 2022-11-23 RX ORDER — METFORMIN HYDROCHLORIDE 1000 MG/1
1000 TABLET ORAL 2 TIMES DAILY
Qty: 60 TABLET | Refills: 1 | Status: SHIPPED | OUTPATIENT
Start: 2022-11-23 | End: 2023-03-27 | Stop reason: SDUPTHER

## 2022-11-23 RX ORDER — HYDROCHLOROTHIAZIDE 25 MG/1
25 TABLET ORAL DAILY
Qty: 90 TABLET | Refills: 1 | Status: SHIPPED | OUTPATIENT
Start: 2022-11-23 | End: 2023-03-27 | Stop reason: SDUPTHER

## 2022-11-23 RX ORDER — LOVASTATIN 40 MG/1
40 TABLET ORAL NIGHTLY
Qty: 90 TABLET | Refills: 1 | Status: SHIPPED | OUTPATIENT
Start: 2022-11-23 | End: 2023-03-27 | Stop reason: SDUPTHER

## 2022-11-23 RX ORDER — SEMAGLUTIDE 2.68 MG/ML
2 INJECTION, SOLUTION SUBCUTANEOUS
Qty: 3 ML | Refills: 1 | Status: SHIPPED | OUTPATIENT
Start: 2022-11-23 | End: 2023-03-01 | Stop reason: SDUPTHER

## 2022-11-23 RX ORDER — LANCETS 33 GAUGE
90 EACH MISCELLANEOUS 3 TIMES DAILY
Qty: 90 EACH | Refills: 11 | Status: SHIPPED | OUTPATIENT
Start: 2022-11-23 | End: 2022-11-23

## 2022-11-23 RX ORDER — ZINC GLUCONATE 50 MG
50 TABLET ORAL DAILY
Qty: 90 TABLET | Refills: 0 | Status: SHIPPED | OUTPATIENT
Start: 2022-11-23 | End: 2022-12-22

## 2022-11-23 RX ORDER — GLIPIZIDE 5 MG/1
5 TABLET ORAL 2 TIMES DAILY WITH MEALS
Qty: 180 TABLET | Refills: 1 | Status: SHIPPED | OUTPATIENT
Start: 2022-11-23 | End: 2023-03-27 | Stop reason: SDUPTHER

## 2022-11-23 NOTE — PROGRESS NOTES
ALEX Rodriguez   [facility] [address] [place] [address]  [phone]      PATIENT NAME: Hailey Duval  : 1965  DATE: 22  MRN: [id number]      Billing Provider: ALEX Rodriguez  Level of Service:   Patient PCP Information       Provider PCP Type    ALEX Rodriguez General            Reason for Visit / Chief Complaint: Hypertension, Hyperlipidemia, Diabetes, and Follow-up       Update PCP  Update Chief Complaint         History of Present Illness / Problem Focused Workflow     Hailey Duval presents to the clinic with Hypertension, Hyperlipidemia, Diabetes, and Follow-up     Routine follow up with lab and med refills. She denies any complaints  Overall doing well. Bp well controlled. Home meds reviewed.       Review of Systems     Review of Systems   Constitutional:  Negative for fatigue and fever.   HENT:  Negative for nasal congestion and sore throat.    Eyes:  Negative for visual disturbance.   Respiratory:  Negative for chest tightness and shortness of breath.    Cardiovascular:  Negative for chest pain and leg swelling.   Gastrointestinal:  Negative for abdominal pain, change in bowel habit and change in bowel habit.   Endocrine: Negative for polydipsia, polyphagia and polyuria.   Genitourinary:  Negative for dysuria and hematuria.   Musculoskeletal:  Negative for back pain and leg pain.   Integumentary:  Negative for rash.   Neurological:  Negative for dizziness, syncope, weakness and light-headedness.      Medical / Social / Family History     Past Medical History:   Diagnosis Date    Anemia     Anemia, vitamin B12 deficiency     Chronic idiopathic constipation     chronic kidney disease     Depression     Diabetes mellitus, type 2     Dysphagia     GERD (gastroesophageal reflux disease)     Hyperlipidemia     Hypertension     Left ventricular hypertrophy     Nonrheumatic  tricuspid (valve) insufficiency     Obese     Osteoarthritis of knee, unspecified     Ulcer of heel and midfoot        Past Surgical History:   Procedure Laterality Date     SECTION      FOOT SURGERY      JOINT REPLACEMENT Right     Hip    OTHER SURGICAL HISTORY      Foot and Toe surgery procedure screws in right foot        Social History  Ms. Duval  reports that she has never smoked. She has never used smokeless tobacco. She reports current drug use. Drug: Marijuana. She reports that she does not drink alcohol.    Family History  Ms. Duval's family history includes Breast cancer in her sister; Diabetes in her mother; Glaucoma in an other family member; Heart disease in an other family member; Hyperlipidemia in an other family member; Hypertension in her father and mother.    Medications and Allergies     Medications  Outpatient Medications Marked as Taking for the 22 encounter (Office Visit) with ALEX Rodriguez   Medication Sig Dispense Refill    alcohol swabs (BD ALCOHOL SWABS) PadM Use as directed 300 each 4    ascorbic acid, vitamin C, (VITAMIN C) 1000 MG tablet Take 1 tablet (1,000 mg total) by mouth once daily. 28 tablet 0    azithromycin (Z-JAMES) 250 MG tablet Take 2 tablets by mouth on day 1; Take 1 tablet by mouth on days 2-5 6 tablet 0    blood-glucose meter (ACCU-CHEK GUIDE GLUCOSE METER) Misc Use as directed 1 each 3    diclofenac sodium (VOLTAREN) 1 % Gel Apply 2 g topically 4 (four) times daily. 200 g 0    ergocalciferol (ERGOCALCIFEROL) 50,000 unit Cap Take 1 capsule (50,000 Units total) by mouth every 7 days. 90 capsule 3    HYDROcodone-acetaminophen (NORCO)  mg per tablet Take 1 tablet by mouth daily as needed.       traZODone (DESYREL) 100 MG tablet Take 1 tablet (100 mg total) by mouth nightly as needed for Insomnia. 90 tablet 3    zinc sulfate 50 mg zinc (220 mg) Tab tablet Take 1 tablet (220 mg total) by mouth once daily. 20 tablet 0    [DISCONTINUED] aspirin  (ECOTRIN) 81 MG EC tablet Take 1 tablet (81 mg total) by mouth once daily. 90 tablet 3    [DISCONTINUED] blood sugar diagnostic (ACCU-CHEK GUIDE TEST STRIPS) Strp Use as directed 100 strip 0    [DISCONTINUED] cariprazine (VRAYLAR) 1.5 mg Cap Take 1 capsule (1.5 mg total) by mouth once daily. 90 capsule 3    [DISCONTINUED] esomeprazole (NEXIUM) 40 MG capsule Take 1 capsule (40 mg total) by mouth once daily. 90 capsule 3    [DISCONTINUED] glipiZIDE (GLUCOTROL) 5 MG tablet Take 1 tablet (5 mg total) by mouth 2 (two) times daily with meals. 180 tablet 3    [DISCONTINUED] hydroCHLOROthiazide (HYDRODIURIL) 25 MG tablet Take 1 tablet (25 mg total) by mouth once daily. 90 tablet 3    [DISCONTINUED] lancets (ACCU-CHEK SOFTCLIX LANCETS) Misc USE THREE TIMES DAILY 100 each 0    [DISCONTINUED] lancets (ONETOUCH DELICA LANCETS) 33 gauge Misc 90 lancets by Misc.(Non-Drug; Combo Route) route 3 (three) times daily. 90 each 11    [DISCONTINUED] lovastatin (MEVACOR) 40 MG tablet Take 1 tablet (40 mg total) by mouth every evening. 90 tablet 3    [DISCONTINUED] metFORMIN (GLUCOPHAGE) 1000 MG tablet Take 1 tablet (1,000 mg total) by mouth 2 (two) times daily. 60 tablet 3    [DISCONTINUED] semaglutide (OZEMPIC) 2 mg/dose (8 mg/3 mL) PnIj Inject 2 mg into the skin every 7 days. 3 mL 1    [DISCONTINUED] zinc gluconate 50 mg tablet Take 50 mg by mouth once daily.         Allergies  Review of patient's allergies indicates:  No Known Allergies    Physical Examination     Vitals:    11/23/22 0811   BP: 114/85   Pulse: 87   Resp: 18   Temp: 97.1 °F (36.2 °C)     Physical Exam  Eyes:      Pupils: Pupils are equal, round, and reactive to light.   Cardiovascular:      Rate and Rhythm: Normal rate and regular rhythm.      Heart sounds: Normal heart sounds. No murmur heard.  Pulmonary:      Breath sounds: Normal breath sounds. No wheezing, rhonchi or rales.   Abdominal:      General: Bowel sounds are normal.   Musculoskeletal:         General: No  swelling.      Cervical back: Normal range of motion and neck supple.   Skin:     General: Skin is warm and dry.   Neurological:      Mental Status: She is alert and oriented to person, place, and time.        Assessment and Plan (including Health Maintenance)      Problem List  [employer]  Document Outside HM   :    Plan:     Cont home meds  Lab today     Health Maintenance Due   Topic Date Due    TETANUS VACCINE  Never done    COVID-19 Vaccine (4 - Booster for Gurdeep series) 06/29/2022    Foot Exam  09/11/2022    Eye Exam  11/18/2022       Problem List Items Addressed This Visit          Psychiatric    Bipolar depression    Relevant Medications    cariprazine (VRAYLAR) 1.5 mg Cap       Cardiac/Vascular    Essential hypertension - Primary    Relevant Medications    aspirin (ECOTRIN) 81 MG EC tablet    hydroCHLOROthiazide (HYDRODIURIL) 25 MG tablet    Other Relevant Orders    Comprehensive Metabolic Panel    Hyperlipidemia associated with type 2 diabetes mellitus    Relevant Medications    glipiZIDE (GLUCOTROL) 5 MG tablet    lovastatin (MEVACOR) 40 MG tablet    metFORMIN (GLUCOPHAGE) 1000 MG tablet    semaglutide (OZEMPIC) 2 mg/dose (8 mg/3 mL) PnIj    Other Relevant Orders    Lipid Panel       Endocrine    Type 2 diabetes mellitus with hyperglycemia, without long-term current use of insulin    Relevant Medications    glipiZIDE (GLUCOTROL) 5 MG tablet    metFORMIN (GLUCOPHAGE) 1000 MG tablet    semaglutide (OZEMPIC) 2 mg/dose (8 mg/3 mL) PnIj    Type 2 diabetes mellitus without complication, without long-term current use of insulin    Relevant Medications    glipiZIDE (GLUCOTROL) 5 MG tablet    metFORMIN (GLUCOPHAGE) 1000 MG tablet    semaglutide (OZEMPIC) 2 mg/dose (8 mg/3 mL) PnIj    Other Relevant Orders    Hemoglobin A1C     Other Visit Diagnoses       Influenza vaccination administered at current visit        Relevant Orders    Influenza - Quadrivalent (PF) (Completed)    Pneumococcal vaccination administered  at current visit        Relevant Orders    Pneumococcal Conjugate Vaccine (20 Valent) (IM) (Completed)            Health Maintenance Topics with due status: Not Due       Topic Last Completion Date    Cervical Cancer Screening 09/15/2021    Diabetes Urine Screening 08/10/2022    Lipid Panel 08/10/2022    Hemoglobin A1c 08/10/2022    Mammogram 09/12/2022    Shingles Vaccine 10/17/2022    Low Dose Statin 11/23/2022       Future Appointments   Date Time Provider Department Center   2/9/2023  9:00 AM ALEX [facility]   3/28/2023  8:20 AM ALEX [facility] [place]   5/10/2023  9:00 AM AWV NURSE, [facility] FAMILY MEDICINE [facility]   9/18/2023  7:45 AM R[facility] MAMMO1 [facility] MMIC [facility] [place]            Signature:  ALEX [facility] [address] [place] [address]  [phone]    Date of encounter: 11/23/22

## 2022-12-22 ENCOUNTER — OFFICE VISIT (OUTPATIENT)
Dept: FAMILY MEDICINE | Facility: CLINIC | Age: 57
End: 2022-12-22
Payer: MEDICARE

## 2022-12-22 VITALS
RESPIRATION RATE: 16 BRPM | HEART RATE: 97 BPM | WEIGHT: 194 LBS | OXYGEN SATURATION: 98 % | BODY MASS INDEX: 36.63 KG/M2 | SYSTOLIC BLOOD PRESSURE: 112 MMHG | HEIGHT: 61 IN | TEMPERATURE: 98 F | DIASTOLIC BLOOD PRESSURE: 81 MMHG

## 2022-12-22 DIAGNOSIS — M19.90 ARTHRITIS: Primary | ICD-10-CM

## 2022-12-22 PROCEDURE — 3061F NEG MICROALBUMINURIA REV: CPT | Mod: ,,, | Performed by: NURSE PRACTITIONER

## 2022-12-22 PROCEDURE — 3008F PR BODY MASS INDEX (BMI) DOCUMENTED: ICD-10-PCS | Mod: ,,, | Performed by: NURSE PRACTITIONER

## 2022-12-22 PROCEDURE — 3074F SYST BP LT 130 MM HG: CPT | Mod: ,,, | Performed by: NURSE PRACTITIONER

## 2022-12-22 PROCEDURE — 96372 PR INJECTION,THERAP/PROPH/DIAG2ST, IM OR SUBCUT: ICD-10-PCS | Mod: ,,, | Performed by: NURSE PRACTITIONER

## 2022-12-22 PROCEDURE — 3074F PR MOST RECENT SYSTOLIC BLOOD PRESSURE < 130 MM HG: ICD-10-PCS | Mod: ,,, | Performed by: NURSE PRACTITIONER

## 2022-12-22 PROCEDURE — 99213 OFFICE O/P EST LOW 20 MIN: CPT | Mod: 25,,, | Performed by: NURSE PRACTITIONER

## 2022-12-22 PROCEDURE — 1159F PR MEDICATION LIST DOCUMENTED IN MEDICAL RECORD: ICD-10-PCS | Mod: ,,, | Performed by: NURSE PRACTITIONER

## 2022-12-22 PROCEDURE — 99213 PR OFFICE/OUTPT VISIT, EST, LEVL III, 20-29 MIN: ICD-10-PCS | Mod: 25,,, | Performed by: NURSE PRACTITIONER

## 2022-12-22 PROCEDURE — 3061F PR NEG MICROALBUMINURIA RESULT DOCUMENTED/REVIEW: ICD-10-PCS | Mod: ,,, | Performed by: NURSE PRACTITIONER

## 2022-12-22 PROCEDURE — 3044F HG A1C LEVEL LT 7.0%: CPT | Mod: ,,, | Performed by: NURSE PRACTITIONER

## 2022-12-22 PROCEDURE — 3066F NEPHROPATHY DOC TX: CPT | Mod: ,,, | Performed by: NURSE PRACTITIONER

## 2022-12-22 PROCEDURE — 3066F PR DOCUMENTATION OF TREATMENT FOR NEPHROPATHY: ICD-10-PCS | Mod: ,,, | Performed by: NURSE PRACTITIONER

## 2022-12-22 PROCEDURE — 96372 THER/PROPH/DIAG INJ SC/IM: CPT | Mod: ,,, | Performed by: NURSE PRACTITIONER

## 2022-12-22 PROCEDURE — 3079F DIAST BP 80-89 MM HG: CPT | Mod: ,,, | Performed by: NURSE PRACTITIONER

## 2022-12-22 PROCEDURE — 3008F BODY MASS INDEX DOCD: CPT | Mod: ,,, | Performed by: NURSE PRACTITIONER

## 2022-12-22 PROCEDURE — 3044F PR MOST RECENT HEMOGLOBIN A1C LEVEL <7.0%: ICD-10-PCS | Mod: ,,, | Performed by: NURSE PRACTITIONER

## 2022-12-22 PROCEDURE — 3079F PR MOST RECENT DIASTOLIC BLOOD PRESSURE 80-89 MM HG: ICD-10-PCS | Mod: ,,, | Performed by: NURSE PRACTITIONER

## 2022-12-22 PROCEDURE — 1159F MED LIST DOCD IN RCRD: CPT | Mod: ,,, | Performed by: NURSE PRACTITIONER

## 2022-12-22 RX ORDER — IBUPROFEN 600 MG/1
600 TABLET ORAL EVERY 6 HOURS PRN
Qty: 30 TABLET | Refills: 0 | Status: SHIPPED | OUTPATIENT
Start: 2022-12-22

## 2022-12-22 RX ORDER — KETOROLAC TROMETHAMINE 30 MG/ML
60 INJECTION, SOLUTION INTRAMUSCULAR; INTRAVENOUS
Status: COMPLETED | OUTPATIENT
Start: 2022-12-22 | End: 2022-12-22

## 2022-12-22 RX ADMIN — KETOROLAC TROMETHAMINE 60 MG: 30 INJECTION, SOLUTION INTRAMUSCULAR; INTRAVENOUS at 02:12

## 2022-12-22 NOTE — PROGRESS NOTES
ALEX Rodriguez   [facility] [address] [place] [address]  [phone]      PATIENT NAME: Hailey Duval  : 1965  DATE: 22  MRN: [id number]      Billing Provider: ALEX Rodriguez  Level of Service:   Patient PCP Information       Provider PCP Type    ALEX Rodriguez General            Reason for Visit / Chief Complaint: Leg Pain       Update PCP  Update Chief Complaint         History of Present Illness / Problem Focused Workflow     Hailey Duval presents to the clinic with Leg Pain     Pt presents for evaluation of acute on chronic right leg pain. She reports previous surgery of rods and screws in her left femur. Over the last 2 days she reports worsening pain and discomfort.   Discussed likely arthritis.       Review of Systems     Review of Systems   Musculoskeletal:  Positive for leg pain and myalgias. Negative for back pain, gait problem, joint swelling and joint deformity.      Medical / Social / Family History     Past Medical History:   Diagnosis Date    Anemia     Anemia, vitamin B12 deficiency     Chronic idiopathic constipation     chronic kidney disease     Depression     Diabetes mellitus, type 2     Dysphagia     GERD (gastroesophageal reflux disease)     Hyperlipidemia     Hypertension     Left ventricular hypertrophy     Nonrheumatic tricuspid (valve) insufficiency     Obese     Osteoarthritis of knee, unspecified     Ulcer of heel and midfoot        Past Surgical History:   Procedure Laterality Date     SECTION      FOOT SURGERY      JOINT REPLACEMENT Right     Hip    OTHER SURGICAL HISTORY      Foot and Toe surgery procedure screws in right foot        Social History  Ms. Duval  reports that she has never smoked. She has never used smokeless tobacco. She reports current drug use. Drug: Marijuana. She reports that she does not drink alcohol.    Family  History  Ms. Duval's family history includes Breast cancer in her sister; Diabetes in her mother; Glaucoma in an other family member; Heart disease in an other family member; Hyperlipidemia in an other family member; Hypertension in her father and mother.    Medications and Allergies     Medications  Outpatient Medications Marked as Taking for the 12/22/22 encounter (Office Visit) with ALEX Rodriguez   Medication Sig Dispense Refill    alcohol swabs (BD ALCOHOL SWABS) PadM Use as directed 300 each 4    aspirin (ECOTRIN) 81 MG EC tablet Take 1 tablet (81 mg total) by mouth once daily. 90 tablet 3    blood-glucose meter (ACCU-CHEK GUIDE GLUCOSE METER) Misc Use as directed 1 each 3    cariprazine (VRAYLAR) 1.5 mg Cap Take 1 capsule (1.5 mg total) by mouth once daily. 90 capsule 3    diclofenac sodium (VOLTAREN) 1 % Gel Apply 2 g topically 4 (four) times daily. 200 g 0    ergocalciferol (ERGOCALCIFEROL) 50,000 unit Cap Take 1 capsule (50,000 Units total) by mouth every 7 days. 90 capsule 3    esomeprazole (NEXIUM) 40 MG capsule Take 1 capsule (40 mg total) by mouth once daily. 90 capsule 1    glipiZIDE (GLUCOTROL) 5 MG tablet Take 1 tablet (5 mg total) by mouth 2 (two) times daily with meals. 180 tablet 1    hydroCHLOROthiazide (HYDRODIURIL) 25 MG tablet Take 1 tablet (25 mg total) by mouth once daily. 90 tablet 1    HYDROcodone-acetaminophen (NORCO)  mg per tablet Take 1 tablet by mouth daily as needed.       lovastatin (MEVACOR) 40 MG tablet Take 1 tablet (40 mg total) by mouth every evening. 90 tablet 1    metFORMIN (GLUCOPHAGE) 1000 MG tablet Take 1 tablet (1,000 mg total) by mouth 2 (two) times daily. 60 tablet 1    semaglutide (OZEMPIC) 2 mg/dose (8 mg/3 mL) PnIj Inject 2 mg into the skin every 7 days. 3 mL 1    traZODone (DESYREL) 100 MG tablet Take 1 tablet (100 mg total) by mouth nightly as needed for Insomnia. 90 tablet 3     Current Facility-Administered Medications for the 12/22/22 encounter  (Office Visit) with ALEX Rodriguez   Medication Dose Route Frequency Provider Last Rate Last Admin    [COMPLETED] ketorolac injection 60 mg  60 mg Intramuscular 1 time in Clinic/HOD ALEX Rodriguez   60 mg at 12/22/22 1442       Allergies  Review of patient's allergies indicates:  No Known Allergies    Physical Examination     Vitals:    12/22/22 1356   BP: 112/81   Pulse: 97   Resp: 16   Temp: 98.2 °F (36.8 °C)     Physical Exam  Eyes:      Pupils: Pupils are equal, round, and reactive to light.   Cardiovascular:      Rate and Rhythm: Normal rate and regular rhythm.      Heart sounds: No murmur heard.  Pulmonary:      Breath sounds: Normal breath sounds. No wheezing, rhonchi or rales.   Abdominal:      General: Bowel sounds are normal.   Musculoskeletal:         General: No swelling, tenderness or deformity.      Cervical back: Normal range of motion and neck supple.      Right lower leg: No edema.   Skin:     General: Skin is warm and dry.   Neurological:      Mental Status: She is alert and oriented to person, place, and time.        Assessment and Plan (including Health Maintenance)      Problem List  [employer]  Document Outside HM   :    Plan:   Arthritis       Health Maintenance Due   Topic Date Due    TETANUS VACCINE  Never done    COVID-19 Vaccine (4 - Booster for Gurdeep series) 06/29/2022    Foot Exam  09/11/2022    Eye Exam  11/18/2022    Shingles Vaccine (2 of 2) 12/12/2022       Problem List Items Addressed This Visit    None  Visit Diagnoses       Arthritis    -  Primary    Relevant Medications    ketorolac injection 60 mg (Completed)    ibuprofen (ADVIL,MOTRIN) 600 MG tablet            Health Maintenance Topics with due status: Not Due       Topic Last Completion Date    Cervical Cancer Screening 09/15/2021    Diabetes Urine Screening 08/10/2022    Mammogram 09/12/2022    Lipid Panel 11/23/2022    Hemoglobin A1c 11/23/2022    Low Dose Statin 12/22/2022       Future Appointments    Date Time Provider Department Center   2/9/2023  9:00 AM ALEX [facility]   3/28/2023  8:20 AM ALEX Rodriguez [facility] [place]   5/10/2023  9:00 AM AWV NURSE, [facility] [place]   9/18/2023  7:45 AM [facility] MAMMO1 [facility] MMIC [facility] [place]            Signature:  ALEX Rodriguez  [facility] [address] [place] [address]  [phone]    Date of encounter: 12/22/22

## 2023-03-01 RX ORDER — SEMAGLUTIDE 2.68 MG/ML
2 INJECTION, SOLUTION SUBCUTANEOUS
Qty: 3 PEN | Refills: 1 | Status: SHIPPED | OUTPATIENT
Start: 2023-03-01 | End: 2023-08-24 | Stop reason: SDUPTHER

## 2023-03-28 ENCOUNTER — OFFICE VISIT (OUTPATIENT)
Dept: FAMILY MEDICINE | Facility: CLINIC | Age: 58
End: 2023-03-28
Payer: MEDICARE

## 2023-03-28 VITALS
RESPIRATION RATE: 18 BRPM | BODY MASS INDEX: 36.25 KG/M2 | DIASTOLIC BLOOD PRESSURE: 84 MMHG | SYSTOLIC BLOOD PRESSURE: 114 MMHG | WEIGHT: 192 LBS | TEMPERATURE: 97 F | HEART RATE: 80 BPM | HEIGHT: 61 IN | OXYGEN SATURATION: 100 %

## 2023-03-28 DIAGNOSIS — I10 ESSENTIAL HYPERTENSION: Primary | ICD-10-CM

## 2023-03-28 DIAGNOSIS — E78.5 HYPERLIPIDEMIA ASSOCIATED WITH TYPE 2 DIABETES MELLITUS: ICD-10-CM

## 2023-03-28 DIAGNOSIS — E11.65 TYPE 2 DIABETES MELLITUS WITH HYPERGLYCEMIA, WITHOUT LONG-TERM CURRENT USE OF INSULIN: ICD-10-CM

## 2023-03-28 DIAGNOSIS — E11.9 TYPE 2 DIABETES MELLITUS WITHOUT COMPLICATION, WITHOUT LONG-TERM CURRENT USE OF INSULIN: ICD-10-CM

## 2023-03-28 DIAGNOSIS — E11.69 HYPERLIPIDEMIA ASSOCIATED WITH TYPE 2 DIABETES MELLITUS: ICD-10-CM

## 2023-03-28 PROBLEM — Z11.59 SCREENING FOR VIRAL DISEASE: Status: RESOLVED | Noted: 2021-09-15 | Resolved: 2023-03-28

## 2023-03-28 PROBLEM — M25.569 KNEE PAIN: Status: RESOLVED | Noted: 2022-05-21 | Resolved: 2023-03-28

## 2023-03-28 PROBLEM — Z12.4 ENCOUNTER FOR PAPANICOLAOU SMEAR OF CERVIX: Status: RESOLVED | Noted: 2021-09-15 | Resolved: 2023-03-28

## 2023-03-28 PROBLEM — U07.1 COVID: Status: RESOLVED | Noted: 2022-05-19 | Resolved: 2023-03-28

## 2023-03-28 PROBLEM — R10.9 ABDOMINAL PAIN: Status: RESOLVED | Noted: 2021-12-02 | Resolved: 2023-03-28

## 2023-03-28 PROBLEM — Z11.4 ENCOUNTER FOR SCREENING FOR HIV: Status: RESOLVED | Noted: 2022-02-01 | Resolved: 2023-03-28

## 2023-03-28 PROBLEM — Z11.59 NEED FOR HEPATITIS C SCREENING TEST: Status: RESOLVED | Noted: 2022-02-01 | Resolved: 2023-03-28

## 2023-03-28 LAB
ALBUMIN SERPL BCP-MCNC: 3.4 G/DL (ref 3.5–5)
ALBUMIN/GLOB SERPL: 0.9 {RATIO}
ALP SERPL-CCNC: 103 U/L (ref 46–118)
ALT SERPL W P-5'-P-CCNC: 17 U/L (ref 13–56)
ANION GAP SERPL CALCULATED.3IONS-SCNC: 10 MMOL/L (ref 7–16)
AST SERPL W P-5'-P-CCNC: 10 U/L (ref 15–37)
BILIRUB SERPL-MCNC: 0.2 MG/DL (ref ?–1.2)
BUN SERPL-MCNC: 18 MG/DL (ref 7–18)
BUN/CREAT SERPL: 18 (ref 6–20)
CALCIUM SERPL-MCNC: 9.4 MG/DL (ref 8.5–10.1)
CHLORIDE SERPL-SCNC: 102 MMOL/L (ref 98–107)
CHOLEST SERPL-MCNC: 190 MG/DL (ref 0–200)
CHOLEST/HDLC SERPL: 3.1 {RATIO}
CO2 SERPL-SCNC: 27 MMOL/L (ref 21–32)
CREAT SERPL-MCNC: 1.01 MG/DL (ref 0.55–1.02)
EGFR (NO RACE VARIABLE) (RUSH/TITUS): 65 ML/MIN/1.73M²
EST. AVERAGE GLUCOSE BLD GHB EST-MCNC: 124 MG/DL
GLOBULIN SER-MCNC: 4 G/DL (ref 2–4)
GLUCOSE SERPL-MCNC: 141 MG/DL (ref 74–106)
HBA1C MFR BLD HPLC: 6.3 % (ref 4.5–6.6)
HDLC SERPL-MCNC: 62 MG/DL (ref 40–60)
LDLC SERPL CALC-MCNC: 104 MG/DL
LDLC/HDLC SERPL: 1.7 {RATIO}
NONHDLC SERPL-MCNC: 128 MG/DL
POTASSIUM SERPL-SCNC: 3.4 MMOL/L (ref 3.5–5.1)
PROT SERPL-MCNC: 7.4 G/DL (ref 6.4–8.2)
SODIUM SERPL-SCNC: 136 MMOL/L (ref 136–145)
TRIGL SERPL-MCNC: 119 MG/DL (ref 35–150)
VLDLC SERPL-MCNC: 24 MG/DL

## 2023-03-28 PROCEDURE — 83036 HEMOGLOBIN A1C: ICD-10-PCS | Mod: ,,, | Performed by: CLINICAL MEDICAL LABORATORY

## 2023-03-28 PROCEDURE — 80053 COMPREHEN METABOLIC PANEL: CPT | Mod: ,,, | Performed by: CLINICAL MEDICAL LABORATORY

## 2023-03-28 PROCEDURE — 99213 PR OFFICE/OUTPT VISIT, EST, LEVL III, 20-29 MIN: ICD-10-PCS | Mod: ,,, | Performed by: NURSE PRACTITIONER

## 2023-03-28 PROCEDURE — 3074F PR MOST RECENT SYSTOLIC BLOOD PRESSURE < 130 MM HG: ICD-10-PCS | Mod: ,,, | Performed by: NURSE PRACTITIONER

## 2023-03-28 PROCEDURE — 1160F RVW MEDS BY RX/DR IN RCRD: CPT | Mod: ,,, | Performed by: NURSE PRACTITIONER

## 2023-03-28 PROCEDURE — 3074F SYST BP LT 130 MM HG: CPT | Mod: ,,, | Performed by: NURSE PRACTITIONER

## 2023-03-28 PROCEDURE — 80053 COMPREHENSIVE METABOLIC PANEL: ICD-10-PCS | Mod: ,,, | Performed by: CLINICAL MEDICAL LABORATORY

## 2023-03-28 PROCEDURE — 99213 OFFICE O/P EST LOW 20 MIN: CPT | Mod: ,,, | Performed by: NURSE PRACTITIONER

## 2023-03-28 PROCEDURE — 1160F PR REVIEW ALL MEDS BY PRESCRIBER/CLIN PHARMACIST DOCUMENTED: ICD-10-PCS | Mod: ,,, | Performed by: NURSE PRACTITIONER

## 2023-03-28 PROCEDURE — 80061 LIPID PANEL: ICD-10-PCS | Mod: ,,, | Performed by: CLINICAL MEDICAL LABORATORY

## 2023-03-28 PROCEDURE — 1159F PR MEDICATION LIST DOCUMENTED IN MEDICAL RECORD: ICD-10-PCS | Mod: ,,, | Performed by: NURSE PRACTITIONER

## 2023-03-28 PROCEDURE — 3079F DIAST BP 80-89 MM HG: CPT | Mod: ,,, | Performed by: NURSE PRACTITIONER

## 2023-03-28 PROCEDURE — 3008F BODY MASS INDEX DOCD: CPT | Mod: ,,, | Performed by: NURSE PRACTITIONER

## 2023-03-28 PROCEDURE — 3079F PR MOST RECENT DIASTOLIC BLOOD PRESSURE 80-89 MM HG: ICD-10-PCS | Mod: ,,, | Performed by: NURSE PRACTITIONER

## 2023-03-28 PROCEDURE — 80061 LIPID PANEL: CPT | Mod: ,,, | Performed by: CLINICAL MEDICAL LABORATORY

## 2023-03-28 PROCEDURE — 83036 HEMOGLOBIN GLYCOSYLATED A1C: CPT | Mod: ,,, | Performed by: CLINICAL MEDICAL LABORATORY

## 2023-03-28 PROCEDURE — 1159F MED LIST DOCD IN RCRD: CPT | Mod: ,,, | Performed by: NURSE PRACTITIONER

## 2023-03-28 PROCEDURE — 3008F PR BODY MASS INDEX (BMI) DOCUMENTED: ICD-10-PCS | Mod: ,,, | Performed by: NURSE PRACTITIONER

## 2023-03-28 RX ORDER — ESOMEPRAZOLE MAGNESIUM 40 MG/1
40 CAPSULE, DELAYED RELEASE ORAL DAILY
Qty: 90 CAPSULE | Refills: 1 | Status: SHIPPED | OUTPATIENT
Start: 2023-03-28 | End: 2023-09-27 | Stop reason: SDUPTHER

## 2023-03-28 RX ORDER — HYDROCHLOROTHIAZIDE 25 MG/1
25 TABLET ORAL DAILY
Qty: 90 TABLET | Refills: 1 | Status: SHIPPED | OUTPATIENT
Start: 2023-03-28 | End: 2023-09-27 | Stop reason: SDUPTHER

## 2023-03-28 RX ORDER — LOVASTATIN 40 MG/1
40 TABLET ORAL NIGHTLY
Qty: 90 TABLET | Refills: 1 | Status: SHIPPED | OUTPATIENT
Start: 2023-03-28 | End: 2023-09-27 | Stop reason: SDUPTHER

## 2023-03-28 RX ORDER — GLIPIZIDE 5 MG/1
5 TABLET ORAL 2 TIMES DAILY WITH MEALS
Qty: 180 TABLET | Refills: 1 | Status: SHIPPED | OUTPATIENT
Start: 2023-03-28 | End: 2023-09-27 | Stop reason: SDUPTHER

## 2023-03-28 RX ORDER — METFORMIN HYDROCHLORIDE 1000 MG/1
1000 TABLET ORAL 2 TIMES DAILY
Qty: 180 TABLET | Refills: 1 | Status: SHIPPED | OUTPATIENT
Start: 2023-03-28 | End: 2023-09-27 | Stop reason: SDUPTHER

## 2023-03-28 NOTE — ASSESSMENT & PLAN NOTE
"The current medical regimen is effective;  continue present plan and medications  Vitals:    03/28/23 0840   BP: 114/84   BP Location: Left arm   Patient Position: Sitting   BP Method: Large (Automatic)   Pulse: 80   Resp: 18   Temp: 97.3 °F (36.3 °C)   TempSrc: Oral   SpO2: 100%   Weight: 87.1 kg (192 lb)   Height: 5' 1" (1.549 m)     "

## 2023-03-28 NOTE — ASSESSMENT & PLAN NOTE
The current medical regimen is effective;  continue present plan and medications.  Hemoglobin A1C   Date Value Ref Range Status   11/23/2022 6.3 4.5 - 6.6 % Final     Comment:       Normal:               <5.7%  Pre-Diabetic:       5.7% to 6.4%  Diabetic:             >6.4%  Diabetic Goal:     <7%   08/10/2022 6.1 4.5 - 6.6 % Final     Comment:       Normal:               <5.7%  Pre-Diabetic:       5.7% to 6.4%  Diabetic:             >6.4%  Diabetic Goal:     <7%   05/04/2022 6.3 4.5 - 6.6 % Final     Comment:       Normal:               <5.7%  Pre-Diabetic:       5.7% to 6.4%  Diabetic:             >6.4%  Diabetic Goal:     <7%

## 2023-03-28 NOTE — PROGRESS NOTES
ALEX Rodriguez   [facility] [address] [place] [address]  [phone]      PATIENT NAME: Hailey Duval  : 1965  DATE: 3/28/23  MRN: [id number]      Billing Provider: ALEX Rodriguez  Level of Service:   Patient PCP Information       Provider PCP Type    ALEX Rodriguez General            Reason for Visit / Chief Complaint: Hypertension, Hyperlipidemia, Diabetes, and Follow-up (3 mos)       Update PCP  Update Chief Complaint         History of Present Illness / Problem Focused Workflow     Hailey Dvual presents to the clinic with Hypertension, Hyperlipidemia, Diabetes, and Follow-up (3 mos)     Pt presents for routine follow up with lab and med refills. Overall doing well.      BP appears well controlled today. Home meds reviewed  The current medical regimen is effective;  continue present plan and medications. Recommended patient to check home readings to monitor and see me for followup as scheduled or sooner as needed.   Discussed sodium restriction, maintaining ideal body weight and regular exercise program as physiologic means to continue to achieve blood pressure control in addition to medication compliance.  Patient was educated that both decongestant and anti-inflammatory medication may raise blood pressure.    Diabetes treatment goals: (unless otherwise indicated due to risk factor stratification)  To achieve normal or near normal glucose levels.  Fasting glucose:   Before meals: 100-140  2 hours after meal:less 150  Bedtime goal glucose > 100          Review of Systems     Review of Systems   Constitutional:  Negative for fatigue and fever.   HENT:  Negative for nasal congestion and sore throat.    Eyes:  Negative for visual disturbance.   Respiratory:  Negative for chest tightness and shortness of breath.    Cardiovascular:  Negative for chest pain and leg swelling.    Gastrointestinal:  Negative for abdominal pain, change in bowel habit and change in bowel habit.   Endocrine: Negative for polydipsia, polyphagia and polyuria.   Genitourinary:  Negative for dysuria and hematuria.   Musculoskeletal:  Negative for back pain and leg pain.   Integumentary:  Negative for rash.   Neurological:  Negative for dizziness, syncope, weakness and light-headedness.      Medical / Social / Family History     Past Medical History:   Diagnosis Date    Anemia     Anemia, vitamin B12 deficiency     Chronic idiopathic constipation     chronic kidney disease     Depression     Diabetes mellitus, type 2     Dysphagia     GERD (gastroesophageal reflux disease)     Hyperlipidemia     Hypertension     Left ventricular hypertrophy     Nonrheumatic tricuspid (valve) insufficiency     Obese     Osteoarthritis of knee, unspecified     Ulcer of heel and midfoot        Past Surgical History:   Procedure Laterality Date     SECTION      FOOT SURGERY      JOINT REPLACEMENT Right     Hip    OTHER SURGICAL HISTORY      Foot and Toe surgery procedure screws in right foot        Social History  Ms. Duval  reports that she has never smoked. She has never used smokeless tobacco. She reports current drug use. Drug: Marijuana. She reports that she does not drink alcohol.    Family History  Ms. Duval's family history includes Breast cancer in her sister; Diabetes in her mother; Glaucoma in an other family member; Heart disease in an other family member; Hyperlipidemia in an other family member; Hypertension in her father and mother.    Medications and Allergies     Medications  Outpatient Medications Marked as Taking for the 3/28/23 encounter (Office Visit) with ALEX Rodriguez   Medication Sig Dispense Refill    alcohol swabs (BD ALCOHOL SWABS) PadM Use as directed 300 each 4    aspirin (ECOTRIN) 81 MG EC tablet Take 1 tablet (81 mg total) by mouth once daily. 90 tablet 3    blood-glucose meter  (ACCU-CHEK GUIDE GLUCOSE METER) Misc Use as directed 1 each 3    cariprazine (VRAYLAR) 1.5 mg Cap Take 1 capsule (1.5 mg total) by mouth once daily. 90 capsule 3    diclofenac sodium (VOLTAREN) 1 % Gel Apply 2 g topically 4 (four) times daily. 200 g 0    ergocalciferol (ERGOCALCIFEROL) 50,000 unit Cap Take 1 capsule (50,000 Units total) by mouth every 7 days. 90 capsule 3    HYDROcodone-acetaminophen (NORCO)  mg per tablet Take 1 tablet by mouth daily as needed.       ibuprofen (ADVIL,MOTRIN) 600 MG tablet Take 1 tablet (600 mg total) by mouth every 6 (six) hours as needed for Pain. 30 tablet 0    semaglutide (OZEMPIC) 2 mg/dose (8 mg/3 mL) PnIj Inject 2 mg into the skin every 7 days. 3 pen 1    traZODone (DESYREL) 100 MG tablet Take 1 tablet (100 mg total) by mouth nightly as needed for Insomnia. 90 tablet 3       Allergies  Review of patient's allergies indicates:  No Known Allergies    Physical Examination     Vitals:    03/28/23 0840   BP: 114/84   Pulse: 80   Resp: 18   Temp: 97.3 °F (36.3 °C)     Physical Exam  Eyes:      Pupils: Pupils are equal, round, and reactive to light.   Cardiovascular:      Rate and Rhythm: Normal rate and regular rhythm.      Heart sounds: Normal heart sounds. No murmur heard.  Pulmonary:      Breath sounds: Normal breath sounds. No wheezing, rhonchi or rales.   Abdominal:      General: Bowel sounds are normal.   Musculoskeletal:         General: No swelling.      Cervical back: Normal range of motion and neck supple.   Skin:     General: Skin is warm and dry.   Neurological:      Mental Status: She is alert and oriented to person, place, and time.        Assessment and Plan (including Health Maintenance)      Problem List  [employer]  Document Outside HM   :    Plan:   1. Essential hypertension  Comments:  well controlled  Assessment & Plan:  The current medical regimen is effective;  continue present plan and medications  Vitals:    03/28/23 0840   BP: 114/84   BP Location:  "Left arm   Patient Position: Sitting   BP Method: Large (Automatic)   Pulse: 80   Resp: 18   Temp: 97.3 °F (36.3 °C)   TempSrc: Oral   SpO2: 100%   Weight: 87.1 kg (192 lb)   Height: 5' 1" (1.549 m)       Orders:  -     Comprehensive Metabolic Panel; Future; Expected date: 03/28/2023  -     hydroCHLOROthiazide (HYDRODIURIL) 25 MG tablet; Take 1 tablet (25 mg total) by mouth once daily.  Dispense: 90 tablet; Refill: 1    2. Hyperlipidemia associated with type 2 diabetes mellitus  -     Lipid Panel; Future; Expected date: 03/28/2023  -     lovastatin (MEVACOR) 40 MG tablet; Take 1 tablet (40 mg total) by mouth every evening.  Dispense: 90 tablet; Refill: 1    3. Type 2 diabetes mellitus without complication, without long-term current use of insulin  Comments:  cont home meds    Orders:  -     Hemoglobin A1C; Future; Expected date: 03/28/2023  -     glipiZIDE (GLUCOTROL) 5 MG tablet; Take 1 tablet (5 mg total) by mouth 2 (two) times daily with meals.  Dispense: 180 tablet; Refill: 1  -     metFORMIN (GLUCOPHAGE) 1000 MG tablet; Take 1 tablet (1,000 mg total) by mouth 2 (two) times daily.  Dispense: 180 tablet; Refill: 1    4. Type 2 diabetes mellitus with hyperglycemia, without long-term current use of insulin  Assessment & Plan:  The current medical regimen is effective;  continue present plan and medications.  Hemoglobin A1C   Date Value Ref Range Status   11/23/2022 6.3 4.5 - 6.6 % Final     Comment:       Normal:               <5.7%  Pre-Diabetic:       5.7% to 6.4%  Diabetic:             >6.4%  Diabetic Goal:     <7%   08/10/2022 6.1 4.5 - 6.6 % Final     Comment:       Normal:               <5.7%  Pre-Diabetic:       5.7% to 6.4%  Diabetic:             >6.4%  Diabetic Goal:     <7%   05/04/2022 6.3 4.5 - 6.6 % Final     Comment:       Normal:               <5.7%  Pre-Diabetic:       5.7% to 6.4%  Diabetic:             >6.4%  Diabetic Goal:     <7%           Other orders  -     esomeprazole (NEXIUM) 40 MG capsule; " "Take 1 capsule (40 mg total) by mouth once daily.  Dispense: 90 capsule; Refill: 1           Health Maintenance Due   Topic Date Due    Foot Exam  09/11/2022       Problem List Items Addressed This Visit          Cardiac/Vascular    Essential hypertension - Primary    Current Assessment & Plan     The current medical regimen is effective;  continue present plan and medications  Vitals:    03/28/23 0840   BP: 114/84   BP Location: Left arm   Patient Position: Sitting   BP Method: Large (Automatic)   Pulse: 80   Resp: 18   Temp: 97.3 °F (36.3 °C)   TempSrc: Oral   SpO2: 100%   Weight: 87.1 kg (192 lb)   Height: 5' 1" (1.549 m)            Relevant Medications    hydroCHLOROthiazide (HYDRODIURIL) 25 MG tablet    Other Relevant Orders    Comprehensive Metabolic Panel    Hyperlipidemia associated with type 2 diabetes mellitus    Relevant Medications    glipiZIDE (GLUCOTROL) 5 MG tablet    lovastatin (MEVACOR) 40 MG tablet    metFORMIN (GLUCOPHAGE) 1000 MG tablet    Other Relevant Orders    Lipid Panel       Endocrine    Type 2 diabetes mellitus with hyperglycemia, without long-term current use of insulin    Current Assessment & Plan     The current medical regimen is effective;  continue present plan and medications.  Hemoglobin A1C   Date Value Ref Range Status   11/23/2022 6.3 4.5 - 6.6 % Final     Comment:       Normal:               <5.7%  Pre-Diabetic:       5.7% to 6.4%  Diabetic:             >6.4%  Diabetic Goal:     <7%   08/10/2022 6.1 4.5 - 6.6 % Final     Comment:       Normal:               <5.7%  Pre-Diabetic:       5.7% to 6.4%  Diabetic:             >6.4%  Diabetic Goal:     <7%   05/04/2022 6.3 4.5 - 6.6 % Final     Comment:       Normal:               <5.7%  Pre-Diabetic:       5.7% to 6.4%  Diabetic:             >6.4%  Diabetic Goal:     <7%              Relevant Medications    glipiZIDE (GLUCOTROL) 5 MG tablet    metFORMIN (GLUCOPHAGE) 1000 MG tablet    Type 2 diabetes mellitus without complication, " without long-term current use of insulin    Relevant Medications    glipiZIDE (GLUCOTROL) 5 MG tablet    metFORMIN (GLUCOPHAGE) 1000 MG tablet    Other Relevant Orders    Hemoglobin A1C       Health Maintenance Topics with due status: Not Due       Topic Last Completion Date    Cervical Cancer Screening 09/15/2021    Diabetes Urine Screening 08/10/2022    Mammogram 09/12/2022    Lipid Panel 11/23/2022    Hemoglobin A1c 11/23/2022    Low Dose Statin 03/28/2023       Future Appointments   Date Time Provider Department Center   5/10/2023  9:00 AM AWV NURSE, [facility] [place]   9/18/2023  7:45 AM [facility] MAMMO1 [facility] MMIC [facility] [place]   9/28/2023  8:00 AM ALEX Rodriguez [facility] [place]            Signature:  [facility] [address] [place] [address]  [phone]    Date of encounter: 3/28/23

## 2023-05-04 ENCOUNTER — PATIENT OUTREACH (OUTPATIENT)
Dept: FAMILY MEDICINE | Facility: CLINIC | Age: 58
End: 2023-05-04
Payer: MEDICARE

## 2023-05-04 ENCOUNTER — OFFICE VISIT (OUTPATIENT)
Dept: CARDIOLOGY | Facility: CLINIC | Age: 58
End: 2023-05-04
Payer: MEDICARE

## 2023-05-04 VITALS
BODY MASS INDEX: 37.27 KG/M2 | OXYGEN SATURATION: 99 % | DIASTOLIC BLOOD PRESSURE: 80 MMHG | WEIGHT: 197.38 LBS | SYSTOLIC BLOOD PRESSURE: 138 MMHG | HEART RATE: 82 BPM | HEIGHT: 61 IN

## 2023-05-04 DIAGNOSIS — E11.69 HYPERLIPIDEMIA ASSOCIATED WITH TYPE 2 DIABETES MELLITUS: ICD-10-CM

## 2023-05-04 DIAGNOSIS — I10 HYPERTENSION, UNSPECIFIED TYPE: Primary | ICD-10-CM

## 2023-05-04 DIAGNOSIS — I10 ESSENTIAL HYPERTENSION: ICD-10-CM

## 2023-05-04 DIAGNOSIS — I51.7 LVH (LEFT VENTRICULAR HYPERTROPHY): ICD-10-CM

## 2023-05-04 DIAGNOSIS — E78.5 HYPERLIPIDEMIA ASSOCIATED WITH TYPE 2 DIABETES MELLITUS: ICD-10-CM

## 2023-05-04 PROCEDURE — 3044F PR MOST RECENT HEMOGLOBIN A1C LEVEL <7.0%: ICD-10-PCS | Mod: CPTII,,, | Performed by: NURSE PRACTITIONER

## 2023-05-04 PROCEDURE — 3008F BODY MASS INDEX DOCD: CPT | Mod: CPTII,,, | Performed by: NURSE PRACTITIONER

## 2023-05-04 PROCEDURE — 99214 PR OFFICE/OUTPT VISIT, EST, LEVL IV, 30-39 MIN: ICD-10-PCS | Mod: S$PBB,,, | Performed by: NURSE PRACTITIONER

## 2023-05-04 PROCEDURE — 93010 ELECTROCARDIOGRAM REPORT: CPT | Mod: S$PBB,,, | Performed by: INTERNAL MEDICINE

## 2023-05-04 PROCEDURE — 1159F MED LIST DOCD IN RCRD: CPT | Mod: CPTII,,, | Performed by: NURSE PRACTITIONER

## 2023-05-04 PROCEDURE — 1159F PR MEDICATION LIST DOCUMENTED IN MEDICAL RECORD: ICD-10-PCS | Mod: CPTII,,, | Performed by: NURSE PRACTITIONER

## 2023-05-04 PROCEDURE — 3075F SYST BP GE 130 - 139MM HG: CPT | Mod: CPTII,,, | Performed by: NURSE PRACTITIONER

## 2023-05-04 PROCEDURE — 1160F PR REVIEW ALL MEDS BY PRESCRIBER/CLIN PHARMACIST DOCUMENTED: ICD-10-PCS | Mod: CPTII,,, | Performed by: NURSE PRACTITIONER

## 2023-05-04 PROCEDURE — 99214 OFFICE O/P EST MOD 30 MIN: CPT | Mod: PBBFAC | Performed by: NURSE PRACTITIONER

## 2023-05-04 PROCEDURE — 93005 ELECTROCARDIOGRAM TRACING: CPT | Mod: PBBFAC | Performed by: INTERNAL MEDICINE

## 2023-05-04 PROCEDURE — 1160F RVW MEDS BY RX/DR IN RCRD: CPT | Mod: CPTII,,, | Performed by: NURSE PRACTITIONER

## 2023-05-04 PROCEDURE — 3079F PR MOST RECENT DIASTOLIC BLOOD PRESSURE 80-89 MM HG: ICD-10-PCS | Mod: CPTII,,, | Performed by: NURSE PRACTITIONER

## 2023-05-04 PROCEDURE — 3079F DIAST BP 80-89 MM HG: CPT | Mod: CPTII,,, | Performed by: NURSE PRACTITIONER

## 2023-05-04 PROCEDURE — 99214 OFFICE O/P EST MOD 30 MIN: CPT | Mod: S$PBB,,, | Performed by: NURSE PRACTITIONER

## 2023-05-04 PROCEDURE — 93010 EKG 12-LEAD: ICD-10-PCS | Mod: S$PBB,,, | Performed by: INTERNAL MEDICINE

## 2023-05-04 PROCEDURE — 3075F PR MOST RECENT SYSTOLIC BLOOD PRESS GE 130-139MM HG: ICD-10-PCS | Mod: CPTII,,, | Performed by: NURSE PRACTITIONER

## 2023-05-04 PROCEDURE — 3008F PR BODY MASS INDEX (BMI) DOCUMENTED: ICD-10-PCS | Mod: CPTII,,, | Performed by: NURSE PRACTITIONER

## 2023-05-04 PROCEDURE — 3044F HG A1C LEVEL LT 7.0%: CPT | Mod: CPTII,,, | Performed by: NURSE PRACTITIONER

## 2023-05-04 NOTE — PROGRESS NOTES
PCP: ALEX Rodriguez    Referring Provider:     Subjective:   Hailey Duval is a 57 y.o. female with hx of LVH with impaired relaxation (echo 2017), HTN, DM, and HLD,  who presents for routine follow up. She is doing well and denies any complaints.         Fhx:  Family History   Problem Relation Age of Onset    Diabetes Mother     Hypertension Mother     Hypertension Father     Glaucoma Other     Heart disease Other     Hyperlipidemia Other     Breast cancer Sister      Shx:   Social History     Socioeconomic History    Marital status: Single   Tobacco Use    Smoking status: Never    Smokeless tobacco: Never   Substance and Sexual Activity    Alcohol use: Never    Drug use: Yes     Types: Marijuana     Comment:  per pt stoppled crack x20yrs      Sexual activity: Yes     Social Determinants of Health     Financial Resource Strain: Low Risk     Difficulty of Paying Living Expenses: Not hard at all   Food Insecurity: No Food Insecurity    Worried About Running Out of Food in the Last Year: Never true    Ran Out of Food in the Last Year: Never true   Transportation Needs: No Transportation Needs    Lack of Transportation (Medical): No    Lack of Transportation (Non-Medical): No   Physical Activity: Inactive    Days of Exercise per Week: 0 days    Minutes of Exercise per Session: 0 min   Stress: No Stress Concern Present    Feeling of Stress : Not at all   Social Connections: Moderately Integrated    Frequency of Communication with Friends and Family: Once a week    Frequency of Social Gatherings with Friends and Family: Once a week    Attends Buddhist Services: 1 to 4 times per year    Active Member of Clubs or Organizations: Yes    Attends Club or Organization Meetings: 1 to 4 times per year    Marital Status:    Housing Stability: Unknown    Unable to Pay for Housing in the Last Year: No    Unstable Housing in the Last Year: No       EKG 5/4/2023 RSR with HR 73 bpm; NSTWA  8/4/2022 RSR with HR 67 bpm;  NSTWA    ECHO 2/28/2021  Normal chamber size, wall motion and systolic function with EF >55%  Concentric LVH with impaired relaxation  Mild TR with RVSP 33 mmHg  Mild PI    Lexiscan 5/22/2017  No definite findings of significant reversible ischemia detected by the study  Normal LV size and systolic function  LVEF 71%      Lab Results   Component Value Date     03/28/2023    K 3.4 (L) 03/28/2023     03/28/2023    CO2 27 03/28/2023    BUN 18 03/28/2023    CREATININE 1.01 03/28/2023    CALCIUM 9.4 03/28/2023    ANIONGAP 10 03/28/2023    ESTGFRAFRICA 68 11/16/2021    EGFRNONAA 62 05/04/2022       Lab Results   Component Value Date    CHOL 190 03/28/2023    CHOL 205 (H) 11/23/2022    CHOL 197 08/10/2022     Lab Results   Component Value Date    HDL 62 (H) 03/28/2023    HDL 75 (H) 11/23/2022    HDL 64 (H) 08/10/2022     Lab Results   Component Value Date    LDLCALC 104 03/28/2023    LDLCALC 108 11/23/2022    LDLCALC 118 08/10/2022     Lab Results   Component Value Date    TRIG 119 03/28/2023    TRIG 109 11/23/2022    TRIG 74 08/10/2022     Lab Results   Component Value Date    CHOLHDL 3.1 03/28/2023    CHOLHDL 2.7 11/23/2022    CHOLHDL 3.1 08/10/2022       Lab Results   Component Value Date    WBC 7.73 05/04/2022    HGB 11.6 (L) 05/04/2022    HCT 38.4 05/04/2022    MCV 83.5 05/04/2022     05/04/2022           Current Outpatient Medications:     alcohol swabs (BD ALCOHOL SWABS) PadM, Use as directed, Disp: 300 each, Rfl: 4    aspirin (ECOTRIN) 81 MG EC tablet, Take 1 tablet (81 mg total) by mouth once daily., Disp: 90 tablet, Rfl: 3    blood-glucose meter (ACCU-CHEK GUIDE GLUCOSE METER) Misc, Use as directed, Disp: 1 each, Rfl: 3    cariprazine (VRAYLAR) 1.5 mg Cap, Take 1 capsule (1.5 mg total) by mouth once daily., Disp: 90 capsule, Rfl: 3    diclofenac sodium (VOLTAREN) 1 % Gel, Apply 2 g topically 4 (four) times daily., Disp: 200 g, Rfl: 0    ergocalciferol (ERGOCALCIFEROL) 50,000 unit Cap, Take 1  "capsule (50,000 Units total) by mouth every 7 days., Disp: 90 capsule, Rfl: 3    esomeprazole (NEXIUM) 40 MG capsule, Take 1 capsule (40 mg total) by mouth once daily., Disp: 90 capsule, Rfl: 1    glipiZIDE (GLUCOTROL) 5 MG tablet, Take 1 tablet (5 mg total) by mouth 2 (two) times daily with meals., Disp: 180 tablet, Rfl: 1    hydroCHLOROthiazide (HYDRODIURIL) 25 MG tablet, Take 1 tablet (25 mg total) by mouth once daily., Disp: 90 tablet, Rfl: 1    HYDROcodone-acetaminophen (NORCO)  mg per tablet, Take 1 tablet by mouth daily as needed. , Disp: , Rfl:     ibuprofen (ADVIL,MOTRIN) 600 MG tablet, Take 1 tablet (600 mg total) by mouth every 6 (six) hours as needed for Pain., Disp: 30 tablet, Rfl: 0    lovastatin (MEVACOR) 40 MG tablet, Take 1 tablet (40 mg total) by mouth every evening., Disp: 90 tablet, Rfl: 1    metFORMIN (GLUCOPHAGE) 1000 MG tablet, Take 1 tablet (1,000 mg total) by mouth 2 (two) times daily., Disp: 180 tablet, Rfl: 1    semaglutide (OZEMPIC) 2 mg/dose (8 mg/3 mL) PnIj, Inject 2 mg into the skin every 7 days., Disp: 3 pen, Rfl: 1    traZODone (DESYREL) 100 MG tablet, Take 1 tablet (100 mg total) by mouth nightly as needed for Insomnia., Disp: 90 tablet, Rfl: 3  Meds reviewed but not reconciled. She did not bring her meds.      Review of Systems   Respiratory:  Negative for shortness of breath.    Cardiovascular:  Negative for chest pain, palpitations, orthopnea, claudication, leg swelling and PND.   Neurological:  Negative for dizziness, loss of consciousness and weakness.         Objective:   /80 (BP Location: Left arm, Patient Position: Sitting)   Pulse 82   Ht 5' 1" (1.549 m)   Wt 89.5 kg (197 lb 6.4 oz)   SpO2 99%   BMI 37.30 kg/m²     Physical Exam  Vitals and nursing note reviewed.   Constitutional:       Appearance: Normal appearance. She is obese.   HENT:      Head: Normocephalic and atraumatic.   Neck:      Vascular: No carotid bruit.   Cardiovascular:      Rate and " Rhythm: Normal rate and regular rhythm.      Pulses: Normal pulses.      Heart sounds: Normal heart sounds.   Pulmonary:      Effort: Pulmonary effort is normal.      Breath sounds: Normal breath sounds.   Abdominal:      Palpations: Abdomen is soft.   Musculoskeletal:      Cervical back: Neck supple.      Right lower leg: No edema.      Left lower leg: No edema.   Skin:     General: Skin is warm and dry.      Capillary Refill: Capillary refill takes less than 2 seconds.   Neurological:      General: No focal deficit present.      Mental Status: She is alert.         Assessment:     1. Hypertension, unspecified type  EKG 12-lead    EKG 12-lead      2. Essential hypertension        3. Hyperlipidemia associated with type 2 diabetes mellitus        4. LVH (left ventricular hypertrophy)              Plan:   Essential hypertension  138/80 mmHg  BPusually  ranges 110s-120s/80s    Hyperlipidemia associated with type 2 diabetes mellitus  Lipid panel results from 3/28/2023 reviewed  Trig 119 mg/dl   mg/dl  HDL 62 mg/dl  Lovastatin 40 mg po daily  Lipids followed by PCP    LVH (left ventricular hypertrophy)  Concentric LVH with impaired relaxation by echo 5/22/2017  asymptomatic    RTC: 6 months

## 2023-05-04 NOTE — ASSESSMENT & PLAN NOTE
Lipid panel results from 3/28/2023 reviewed  Trig 119 mg/dl   mg/dl  HDL 62 mg/dl  Lovastatin 40 mg po daily  Lipids followed by PCP

## 2023-05-22 ENCOUNTER — PATIENT OUTREACH (OUTPATIENT)
Dept: ADMINISTRATIVE | Facility: HOSPITAL | Age: 58
End: 2023-05-22

## 2023-05-22 NOTE — PROGRESS NOTES
[employer] chart audit for the following:  Eye exam - last 2021  DM Urine screen in 2023- last 8/2022

## 2023-06-09 DIAGNOSIS — Z71.89 COMPLEX CARE COORDINATION: ICD-10-CM

## 2023-07-25 ENCOUNTER — TELEPHONE (OUTPATIENT)
Dept: FAMILY MEDICINE | Facility: CLINIC | Age: 58
End: 2023-07-25
Payer: MEDICARE

## 2023-07-25 NOTE — TELEPHONE ENCOUNTER
----- Message from ALEX Rodriguez sent at 7/25/2023  3:24 PM CDT -----  Regarding: RE: call  She can have her Rx sent to another pharmacy    ----- Message -----  From: Annemarie Rowley RN  Sent: 7/25/2023   3:24 PM CDT  To: ALEX Rodriguez  Subject: FW: call                                         Please advise  ----- Message -----  From: Amee Cole  Sent: 7/25/2023   3:07 PM CDT  To: Annemarie Rowley RN  Subject: call                                             Pt states the pharmacy is out of ozempic. Can please call to discuss other options? [phone]

## 2023-08-07 ENCOUNTER — PATIENT OUTREACH (OUTPATIENT)
Dept: ADMINISTRATIVE | Facility: HOSPITAL | Age: 58
End: 2023-08-07

## 2023-08-07 ENCOUNTER — OFFICE VISIT (OUTPATIENT)
Dept: FAMILY MEDICINE | Facility: CLINIC | Age: 58
End: 2023-08-07
Payer: MEDICARE

## 2023-08-07 VITALS
SYSTOLIC BLOOD PRESSURE: 110 MMHG | HEART RATE: 79 BPM | HEIGHT: 59 IN | DIASTOLIC BLOOD PRESSURE: 76 MMHG | BODY MASS INDEX: 38.91 KG/M2 | WEIGHT: 193 LBS | OXYGEN SATURATION: 99 % | RESPIRATION RATE: 20 BRPM

## 2023-08-07 DIAGNOSIS — F31.9 BIPOLAR DEPRESSION: ICD-10-CM

## 2023-08-07 DIAGNOSIS — E11.69 HYPERLIPIDEMIA ASSOCIATED WITH TYPE 2 DIABETES MELLITUS: ICD-10-CM

## 2023-08-07 DIAGNOSIS — E11.9 TYPE 2 DIABETES MELLITUS WITHOUT COMPLICATION, WITHOUT LONG-TERM CURRENT USE OF INSULIN: ICD-10-CM

## 2023-08-07 DIAGNOSIS — E78.5 HYPERLIPIDEMIA ASSOCIATED WITH TYPE 2 DIABETES MELLITUS: ICD-10-CM

## 2023-08-07 DIAGNOSIS — E66.01 MORBID OBESITY: ICD-10-CM

## 2023-08-07 DIAGNOSIS — Z00.00 ENCOUNTER FOR SUBSEQUENT ANNUAL WELLNESS VISIT (AWV) IN MEDICARE PATIENT: Primary | ICD-10-CM

## 2023-08-07 DIAGNOSIS — I10 ESSENTIAL HYPERTENSION: ICD-10-CM

## 2023-08-07 DIAGNOSIS — K21.9 GASTROESOPHAGEAL REFLUX DISEASE, UNSPECIFIED WHETHER ESOPHAGITIS PRESENT: ICD-10-CM

## 2023-08-07 PROCEDURE — 1159F MED LIST DOCD IN RCRD: CPT | Mod: ,,, | Performed by: NURSE PRACTITIONER

## 2023-08-07 PROCEDURE — 3078F PR MOST RECENT DIASTOLIC BLOOD PRESSURE < 80 MM HG: ICD-10-PCS | Mod: ,,, | Performed by: NURSE PRACTITIONER

## 2023-08-07 PROCEDURE — 3074F SYST BP LT 130 MM HG: CPT | Mod: ,,, | Performed by: NURSE PRACTITIONER

## 2023-08-07 PROCEDURE — 3008F BODY MASS INDEX DOCD: CPT | Mod: ,,, | Performed by: NURSE PRACTITIONER

## 2023-08-07 PROCEDURE — 3074F PR MOST RECENT SYSTOLIC BLOOD PRESSURE < 130 MM HG: ICD-10-PCS | Mod: ,,, | Performed by: NURSE PRACTITIONER

## 2023-08-07 PROCEDURE — 1160F PR REVIEW ALL MEDS BY PRESCRIBER/CLIN PHARMACIST DOCUMENTED: ICD-10-PCS | Mod: ,,, | Performed by: NURSE PRACTITIONER

## 2023-08-07 PROCEDURE — 3078F DIAST BP <80 MM HG: CPT | Mod: ,,, | Performed by: NURSE PRACTITIONER

## 2023-08-07 PROCEDURE — 1159F PR MEDICATION LIST DOCUMENTED IN MEDICAL RECORD: ICD-10-PCS | Mod: ,,, | Performed by: NURSE PRACTITIONER

## 2023-08-07 PROCEDURE — 3044F PR MOST RECENT HEMOGLOBIN A1C LEVEL <7.0%: ICD-10-PCS | Mod: ,,, | Performed by: NURSE PRACTITIONER

## 2023-08-07 PROCEDURE — 3008F PR BODY MASS INDEX (BMI) DOCUMENTED: ICD-10-PCS | Mod: ,,, | Performed by: NURSE PRACTITIONER

## 2023-08-07 PROCEDURE — G0439 PR MEDICARE ANNUAL WELLNESS SUBSEQUENT VISIT: ICD-10-PCS | Mod: ,,, | Performed by: NURSE PRACTITIONER

## 2023-08-07 PROCEDURE — G0439 PPPS, SUBSEQ VISIT: HCPCS | Mod: ,,, | Performed by: NURSE PRACTITIONER

## 2023-08-07 PROCEDURE — 1160F RVW MEDS BY RX/DR IN RCRD: CPT | Mod: ,,, | Performed by: NURSE PRACTITIONER

## 2023-08-07 PROCEDURE — 3044F HG A1C LEVEL LT 7.0%: CPT | Mod: ,,, | Performed by: NURSE PRACTITIONER

## 2023-08-07 NOTE — PROGRESS NOTES
[facility]       PATIENT NAME: Hailey Duval   : 1965    AGE: 57 y.o. DATE: 2023   MRN: [id number]        Reason for Visit / Chief Complaint: Medicare AWV Follow Up ([employer] WELLNESS SUBSEQUENT VISIT)        Hailey Duval presents for an Subsequent Medicare AWV today.     The following components were reviewed and updated:    Medical/Social/Family History:  Past Medical History:   Diagnosis Date    Anemia     Anemia, vitamin B12 deficiency     Chronic idiopathic constipation     chronic kidney disease     Depression     Diabetes mellitus, type 2     Dysphagia     GERD (gastroesophageal reflux disease)     Hyperlipidemia     Hypertension     Left ventricular hypertrophy     Nonrheumatic tricuspid (valve) insufficiency     Obese     Osteoarthritis of knee, unspecified     Ulcer of heel and midfoot         Family History   Problem Relation Age of Onset    Diabetes Mother     Hypertension Mother     Hypertension Father     Glaucoma Other     Heart disease Other     Hyperlipidemia Other     Breast cancer Sister         Social History     Tobacco Use   Smoking Status Never   Smokeless Tobacco Never      Social History     Substance and Sexual Activity   Alcohol Use Never       Family History   Problem Relation Age of Onset    Diabetes Mother     Hypertension Mother     Hypertension Father     Glaucoma Other     Heart disease Other     Hyperlipidemia Other     Breast cancer Sister        Past Surgical History:   Procedure Laterality Date     SECTION      FOOT SURGERY      JOINT REPLACEMENT Right     Hip    OTHER SURGICAL HISTORY      Foot and Toe surgery procedure screws in right foot          Allergies and Current Medications   Review of patient's allergies indicates:  No Known Allergies    Current Outpatient Medications:     alcohol swabs (BD ALCOHOL SWABS) PadM, Use as directed, Disp: 300 each, Rfl: 4    aspirin (ECOTRIN) 81 MG EC  tablet, Take 1 tablet (81 mg total) by mouth once daily., Disp: 90 tablet, Rfl: 3    blood-glucose meter (ACCU-CHEK GUIDE GLUCOSE METER) Misc, Use as directed, Disp: 1 each, Rfl: 3    cariprazine (VRAYLAR) 1.5 mg Cap, Take 1 capsule (1.5 mg total) by mouth once daily., Disp: 90 capsule, Rfl: 3    diclofenac sodium (VOLTAREN) 1 % Gel, Apply 2 g topically 4 (four) times daily., Disp: 200 g, Rfl: 0    ergocalciferol (ERGOCALCIFEROL) 50,000 unit Cap, Take 1 capsule (50,000 Units total) by mouth every 7 days., Disp: 90 capsule, Rfl: 3    esomeprazole (NEXIUM) 40 MG capsule, Take 1 capsule (40 mg total) by mouth once daily., Disp: 90 capsule, Rfl: 1    glipiZIDE (GLUCOTROL) 5 MG tablet, Take 1 tablet (5 mg total) by mouth 2 (two) times daily with meals., Disp: 180 tablet, Rfl: 1    hydroCHLOROthiazide (HYDRODIURIL) 25 MG tablet, Take 1 tablet (25 mg total) by mouth once daily., Disp: 90 tablet, Rfl: 1    HYDROcodone-acetaminophen (NORCO)  mg per tablet, Take 1 tablet by mouth daily as needed. , Disp: , Rfl:     ibuprofen (ADVIL,MOTRIN) 600 MG tablet, Take 1 tablet (600 mg total) by mouth every 6 (six) hours as needed for Pain., Disp: 30 tablet, Rfl: 0    lovastatin (MEVACOR) 40 MG tablet, Take 1 tablet (40 mg total) by mouth every evening., Disp: 90 tablet, Rfl: 1    metFORMIN (GLUCOPHAGE) 1000 MG tablet, Take 1 tablet (1,000 mg total) by mouth 2 (two) times daily., Disp: 180 tablet, Rfl: 1    semaglutide (OZEMPIC) 2 mg/dose (8 mg/3 mL) PnIj, Inject 2 mg into the skin every 7 days., Disp: 3 pen, Rfl: 1    traZODone (DESYREL) 100 MG tablet, Take 1 tablet (100 mg total) by mouth nightly as needed for Insomnia., Disp: 90 tablet, Rfl: 3    Health Risk Assessment   Fall Risk: No   Obesity: BMI 38.98     Advance Directive:  Does not have an advanced directive. Verbal education and written education included in today's AVS.    X Patient is interested in learning more about how to make advanced directives.  I provided them  paperwork and offered to discuss this with them.   Depression: PHQ9 -0    HTN: 110/76    T2DM: A1C- 6.3   Tobacco use: Denies tobacco use  STI: Not at risk    Alcohol misuse: Denies alcohol use Cage Score: N/a   Statin Use: Continue statin use. Encouraged heart healthy diet & exercise   Mini Co/5      Health Risk Assessment  What is your age?:  (50-60)  Are you male or female?: Female  During the past four weeks, how much have you been bothered by emotional problems such as feeling anxious, depressed, irritable, sad, or downhearted and blue?: Not at all  During the past five weeks, has your physical and/or emotional health limited your social activities with family, friends, neighbors, or groups?: Not at all  During the past four weeks, how much bodily pain have you generally had?: No pain  During the past four weeks, was someone available to help if you needed and wanted help?: Yes, as much as I wanted  During the past four weeks, what was the hardest physical activity you could do for at least two minutes?: Heavy  Can you get to places out of walking distance without help?  (For example, can you travel alone on buses or taxis, or drive your own car?): Yes  Can you go shopping for groceries or clothes without someone's help?: Yes  Can you prepare your own meals?: Yes  Can you do your own housework without help?: Yes  Because of any health problems, do you need the help of another person with your personal care needs such as eating, bathing, dressing, or getting around the house?: No  Can you handle your own money without help?: Yes  During the past four weeks, how would you rate your health in general?: Excellent  How have things been going for you during the past four weeks?: Pretty well  Are you having difficulties driving your car?: No  Do you always fasten your seat belt when you are in a car?: Yes, usually  How often in the past four weeks have you been bothered by falling or dizzy when standing up?:  Never  How often in the past four weeks have you been bothered by sexual problems?: Never  How often in the past four weeks have you been bothered by trouble eating well?: Never  How often in the past four weeks have you been bothered by teeth or denture problems?: Never  How often in the past four weeks have you been bothered with problems using the telephone?: Never  How often in the past four weeks have you been bothered by tiredness or fatigue?: Never  Have you fallen two or more times in the past year?: No  Are you afraid of falling?: No  Are you a smoker?: No  During the past four weeks, how many drinks of wine, beer, or other alcoholic beverages did you have?: No alcohol at all  Do you exercise for about 20 minutes three or more days a week?: No, I usually do not exercise this much  Have you been given any information to help you with hazards in your house that might hurt you?: Yes  Have you been given any information to help you with keeping track of your medications?: Yes  How often do you have trouble taking medicines the way you've been told to take them?: I always take them as prescribed  How confident are you that you can control and manage most of your health problems?: Very confident  What is your race? (Check all that apply.):     Opioid Risk Assessment:  Opioid risk assessment performed today. Patient is LOW risk for opoid abuse.     Opioid Risk Score         Value Time User    Opioid Risk Score  13 8/7/2023 11:08 AM Gisel Abdullahi, RN                 Health Maintenance   Last eye exam: 2022 with Dr. Kay   Last CV screen with lipids: 03/28/2023   Diabetes screening with fasting glucose or A1c: 03/28/2023   Colonoscopy: 06/09/2019- Repeat in 5 years   Flu Vaccine: Out of season   Pneumonia vaccines: 11/23/2022; 01/10/2019   COVID vaccine: 03/10/2021; 10/27/2021; 05/04/2022; 10/06/2022   Hep B vaccine: Not at risk   DEXA: N/A   Last pap/pelvic: 09/15/2021- Negative/Satisfactory    Last Mammogram: 2022. Has appt on 2023   Last PSA screen: N/A   AAA screening: N/A (once in lifetime for males 65-75 who have smoked > 100 cigarettes in lifetime)  HIV Screein2022- Nonreactive  Hepatitis C Screen: 2022- Nonreactive  Low Dose CT Scan: N/A    Health Maintenance Topics with due status: Not Due       Topic Last Completion Date    Colorectal Cancer Screening 2019    Cervical Cancer Screening 09/15/2021    Influenza Vaccine 2022    Low Dose Statin 2023    Lipid Panel 2023    Hemoglobin A1c 2023     Health Maintenance Due   Topic Date Due    Foot Exam  2022    Eye Exam  2022    Diabetes Urine Screening  08/10/2023    Mammogram  2023       Incontinence  Bowel: No  Bladder: No    Lab results available in Epic or see dates from [facility] above:   Lab Results   Component Value Date    CHOL 190 2023    CHOL 205 (H) 2022    CHOL 197 08/10/2022     Lab Results   Component Value Date    HDL 62 (H) 2023    HDL 75 (H) 2022    HDL 64 (H) 08/10/2022     Lab Results   Component Value Date    LDLCALC 104 2023    LDLCALC 108 2022    LDLCALC 118 08/10/2022     Lab Results   Component Value Date    TRIG 119 2023    TRIG 109 2022    TRIG 74 08/10/2022     Lab Results   Component Value Date    CHOLHDL 3.1 2023    CHOLHDL 2.7 2022    CHOLHDL 3.1 08/10/2022       Lab Results   Component Value Date    HGBA1C 6.3 2023       Sodium   Date Value Ref Range Status   2023 136 136 - 145 mmol/L Final     Potassium   Date Value Ref Range Status   2023 3.4 (L) 3.5 - 5.1 mmol/L Final     Chloride   Date Value Ref Range Status   2023 102 98 - 107 mmol/L Final     CO2   Date Value Ref Range Status   2023 27 21 - 32 mmol/L Final     Glucose   Date Value Ref Range Status   2023 141 (H) 74 - 106 mg/dL Final     BUN   Date Value Ref Range Status   2023 18 7 - 18 mg/dL Final  "    Creatinine   Date Value Ref Range Status   03/28/2023 1.01 0.55 - 1.02 mg/dL Final     Calcium   Date Value Ref Range Status   03/28/2023 9.4 8.5 - 10.1 mg/dL Final     Total Protein   Date Value Ref Range Status   03/28/2023 7.4 6.4 - 8.2 g/dL Final     Albumin   Date Value Ref Range Status   03/28/2023 3.4 (L) 3.5 - 5.0 g/dL Final     Bilirubin, Total   Date Value Ref Range Status   03/28/2023 0.2 >0.0 - 1.2 mg/dL Final     Alk Phos   Date Value Ref Range Status   03/28/2023 103 46 - 118 U/L Final     AST   Date Value Ref Range Status   03/28/2023 10 (L) 15 - 37 U/L Final     ALT   Date Value Ref Range Status   03/28/2023 17 13 - 56 U/L Final     Anion Gap   Date Value Ref Range Status   03/28/2023 10 7 - 16 mmol/L Final     eGFR    Date Value Ref Range Status   11/16/2021 68 >=60 mL/min/1.73m² Final     eGFR   Date Value Ref Range Status   05/04/2022 62 >=60 mL/min/1.73m² Final         Care Team   PCP: ALEX Silva    Eye specialist: Rahul   Cardiologist: Luis          **See Completed Assessments for Annual Wellness visit within the encounter summary    The following assessments were completed & reviewed:  Depression Screening  Cognitive function Screening  Timed Get Up Test  Whisper Test  Vision Screen  Health Risk Assessment  Checklist of ADLs and IADLs      Objective  Vitals:    08/07/23 1105   BP: 110/76   Pulse: 79   Resp: 20   SpO2: 99%   Weight: 87.5 kg (193 lb)   Height: 4' 11" (1.499 m)   PainSc: 0-No pain      Body mass index is 38.98 kg/m².  Ideal body weight: 48.8 kg (107 lb 8.4 oz)       Physical Exam  Constitutional:       General: She is not in acute distress.     Appearance: Normal appearance.   HENT:      Head: Normocephalic.   Eyes:      Pupils: Pupils are equal, round, and reactive to light.   Cardiovascular:      Rate and Rhythm: Normal rate and regular rhythm.      Heart sounds: Normal heart sounds. No murmur heard.  Pulmonary:      Effort: Pulmonary effort is " normal.      Breath sounds: Normal breath sounds.   Abdominal:      General: Bowel sounds are normal. There is no distension.      Hernia: No hernia is present.   Musculoskeletal:         General: No swelling or tenderness.      Right lower leg: No edema.      Left lower leg: No edema.   Skin:     General: Skin is warm and dry.   Neurological:      General: No focal deficit present.      Mental Status: She is alert and oriented to person, place, and time.           Assessment:     1. Encounter for subsequent annual wellness visit (AWV) in Medicare patient    2. Type 2 diabetes mellitus without complication, without long-term current use of insulin  - Ambulatory referral/consult to Ophthalmology; Future  - Microalbumin/Creatinine Ratio, Urine; Future    3. Essential hypertension    4. Hyperlipidemia associated with type 2 diabetes mellitus    5. Bipolar depression    6. Gastroesophageal reflux disease, unspecified whether esophagitis present    7. BMI 38.0-38.9,adult    8. Morbid obesity         Plan:    Referrals/Orders:  None     Advised to call office if does not hear from anyone with referral appt within 2-3 weeks to check on status of referral. Voiced understanding.    Keep current on appts & continue medications as ordered. Prevent falls by wearing good non-skid shoes. Encouraged diet & exercise to decrease weight/BMI.      Discussed and provided with a screening schedule and personal prevention plan in accordance with USPSTF age appropriate recommendations and Medicare screening guidelines.   Education, counseling, and referrals were provided as needed.  After Visit Summary printed and given to patient which includes written education and a list of any referrals if indicated. All education discussed with patient & handouts given to patient.      F/u plan for yearly AWV.    Signature: ALEX Silva

## 2023-08-07 NOTE — PATIENT INSTRUCTIONS
Counseling and Referral of Other Preventative  (Italic type indicates deductible and co-insurance are waived)    Patient Name: Hailey Duval  Today's Date: 8/7/2023    Health Maintenance       Date Due Completion Date    Foot Exam 09/11/2022 9/11/2021 (Done)    Override on 9/11/2021: Done    Eye Exam 11/18/2022 11/18/2021    Diabetes Urine Screening 08/10/2023 8/10/2022    Mammogram 09/12/2023 9/12/2022    TETANUS VACCINE 03/28/2024 (Originally 10/17/1984) ---    Influenza Vaccine (1) 09/01/2023 11/23/2022    Hemoglobin A1c 09/28/2023 3/28/2023    Low Dose Statin 03/28/2024 3/28/2023    Lipid Panel 03/28/2024 3/28/2023    Colorectal Cancer Screening 06/09/2024 6/9/2019    Cervical Cancer Screening 09/15/2026 9/15/2021        Orders Placed This Encounter   Procedures    Microalbumin/Creatinine Ratio, Urine    Ambulatory referral/consult to Ophthalmology     The following information is provided to all patients.  This information is to help you find resources for any of the problems found today that may be affecting your health:                Living healthy guide: www.[facility].[place].gov      Understanding Diabetes: www.diabetes.org      Eating healthy: www.cdc.gov/healthyweight      CDC home safety checklist: www.cdc.gov/steadi/patient.html      Agency on Aging: www.goea.[place].[facility]      Alcoholics anonymous (AA): www.aa.org      Physical Activity: www.shanice.nih.gov/qk3oooi      Tobacco use: www.quitwithusla.org

## 2023-08-07 NOTE — LETTER
AUTHORIZATION FOR RELEASE OF   CONFIDENTIAL INFORMATION    Dear [facility],    We are seeing Hailey Duval, date of birth 1965, in the clinic at [facility]. Raiza David ACNP is the patient's PCP. Hailey Duval has an outstanding lab/procedure at the time we reviewed her chart. In order to help keep her health information updated, she has authorized us to request the following medical record(s):        (  )  MAMMOGRAM                                      (  )  COLONOSCOPY      (  )  PAP SMEAR                                          (  )  OUTSIDE LAB RESULTS     (  )  DEXA SCAN                                          (X)  EYE EXAM            (  )  FOOT EXAM                                          (  )  ENTIRE RECORD     (  )  OUTSIDE IMMUNIZATIONS                 (  )  _______________         Please fax records to Raiza David ACNP, [phone]     If you have any questions, please contact Raiza Jane at [phone].           Patient Name: Hailey Duval  : 1965  Patient Phone #: [phone]

## 2023-08-07 NOTE — PROGRESS NOTES
08/07/2023   --Chart accessed for: RECORD REQUEST  --Care Gaps addressed: DM EYE EXAM  Outreach made to patient via CHART REVIEW  Care Everywhere updates requested and reviewed.  Media reports reviewed.  [facility] and [facility] reviewed.  Immunization Database (Imm[employer]/MIXX) reviewed. Vaccinations uploaded: NONE  [facility] abstracted.  Requested DM EYE EXAM records from [facility]

## 2023-08-21 DIAGNOSIS — F31.9 BIPOLAR DEPRESSION: ICD-10-CM

## 2023-08-21 RX ORDER — SEMAGLUTIDE 2.68 MG/ML
2 INJECTION, SOLUTION SUBCUTANEOUS
Qty: 3 EACH | Refills: 1 | Status: CANCELLED | OUTPATIENT
Start: 2023-08-21

## 2023-08-21 RX ORDER — CARIPRAZINE 1.5 MG/1
1.5 CAPSULE, GELATIN COATED ORAL DAILY
Qty: 90 CAPSULE | Refills: 1 | Status: SHIPPED | OUTPATIENT
Start: 2023-08-21 | End: 2024-03-25 | Stop reason: SDUPTHER

## 2023-08-21 NOTE — TELEPHONE ENCOUNTER
----- Message from Adri Hardin sent at 8/21/2023 12:25 PM CDT -----  Patient needs refills on her ozempic sent to [facility] in [place] [phone].

## 2023-08-24 RX ORDER — SEMAGLUTIDE 2.68 MG/ML
2 INJECTION, SOLUTION SUBCUTANEOUS
Qty: 3 EACH | Refills: 1 | Status: SHIPPED | OUTPATIENT
Start: 2023-08-24 | End: 2023-10-18 | Stop reason: SDUPTHER

## 2023-09-15 LAB
LEFT EYE DM RETINOPATHY: NORMAL
RIGHT EYE DM RETINOPATHY: NORMAL

## 2023-09-28 ENCOUNTER — OFFICE VISIT (OUTPATIENT)
Dept: FAMILY MEDICINE | Facility: CLINIC | Age: 58
End: 2023-09-28
Payer: MEDICARE

## 2023-09-28 VITALS
WEIGHT: 196.69 LBS | OXYGEN SATURATION: 99 % | HEART RATE: 74 BPM | RESPIRATION RATE: 16 BRPM | BODY MASS INDEX: 39.65 KG/M2 | SYSTOLIC BLOOD PRESSURE: 125 MMHG | HEIGHT: 59 IN | TEMPERATURE: 98 F | DIASTOLIC BLOOD PRESSURE: 84 MMHG

## 2023-09-28 DIAGNOSIS — E11.69 HYPERLIPIDEMIA ASSOCIATED WITH TYPE 2 DIABETES MELLITUS: ICD-10-CM

## 2023-09-28 DIAGNOSIS — I10 ESSENTIAL HYPERTENSION: Primary | ICD-10-CM

## 2023-09-28 DIAGNOSIS — E11.9 TYPE 2 DIABETES MELLITUS WITHOUT COMPLICATION, WITHOUT LONG-TERM CURRENT USE OF INSULIN: ICD-10-CM

## 2023-09-28 DIAGNOSIS — E78.5 HYPERLIPIDEMIA ASSOCIATED WITH TYPE 2 DIABETES MELLITUS: ICD-10-CM

## 2023-09-28 DIAGNOSIS — I10 ESSENTIAL HYPERTENSION: ICD-10-CM

## 2023-09-28 DIAGNOSIS — E55.9 VITAMIN D DEFICIENCY: ICD-10-CM

## 2023-09-28 DIAGNOSIS — Z23 INFLUENZA VACCINATION ADMINISTERED AT CURRENT VISIT: ICD-10-CM

## 2023-09-28 DIAGNOSIS — F31.9 BIPOLAR DEPRESSION: ICD-10-CM

## 2023-09-28 PROBLEM — N30.01 ACUTE CYSTITIS WITH HEMATURIA: Status: RESOLVED | Noted: 2021-12-02 | Resolved: 2023-09-28

## 2023-09-28 PROBLEM — R73.01 IMPAIRED FASTING GLUCOSE: Status: RESOLVED | Noted: 2022-08-10 | Resolved: 2023-09-28

## 2023-09-28 PROBLEM — E66.3 OVERWEIGHT: Status: RESOLVED | Noted: 2022-05-09 | Resolved: 2023-09-28

## 2023-09-28 PROCEDURE — 3074F PR MOST RECENT SYSTOLIC BLOOD PRESSURE < 130 MM HG: ICD-10-PCS | Mod: ,,, | Performed by: NURSE PRACTITIONER

## 2023-09-28 PROCEDURE — 1159F MED LIST DOCD IN RCRD: CPT | Mod: ,,, | Performed by: NURSE PRACTITIONER

## 2023-09-28 PROCEDURE — 83036 HEMOGLOBIN A1C: ICD-10-PCS | Mod: ,,, | Performed by: CLINICAL MEDICAL LABORATORY

## 2023-09-28 PROCEDURE — 99213 OFFICE O/P EST LOW 20 MIN: CPT | Mod: ,,, | Performed by: NURSE PRACTITIONER

## 2023-09-28 PROCEDURE — 80053 COMPREHEN METABOLIC PANEL: CPT | Mod: ,,, | Performed by: CLINICAL MEDICAL LABORATORY

## 2023-09-28 PROCEDURE — 83036 HEMOGLOBIN GLYCOSYLATED A1C: CPT | Mod: ,,, | Performed by: CLINICAL MEDICAL LABORATORY

## 2023-09-28 PROCEDURE — 3079F DIAST BP 80-89 MM HG: CPT | Mod: ,,, | Performed by: NURSE PRACTITIONER

## 2023-09-28 PROCEDURE — 3008F PR BODY MASS INDEX (BMI) DOCUMENTED: ICD-10-PCS | Mod: ,,, | Performed by: NURSE PRACTITIONER

## 2023-09-28 PROCEDURE — 82043 UR ALBUMIN QUANTITATIVE: CPT | Mod: ,,, | Performed by: CLINICAL MEDICAL LABORATORY

## 2023-09-28 PROCEDURE — 3074F SYST BP LT 130 MM HG: CPT | Mod: ,,, | Performed by: NURSE PRACTITIONER

## 2023-09-28 PROCEDURE — 1160F PR REVIEW ALL MEDS BY PRESCRIBER/CLIN PHARMACIST DOCUMENTED: ICD-10-PCS | Mod: ,,, | Performed by: NURSE PRACTITIONER

## 2023-09-28 PROCEDURE — 90686 IIV4 VACC NO PRSV 0.5 ML IM: CPT | Mod: ,,, | Performed by: NURSE PRACTITIONER

## 2023-09-28 PROCEDURE — 80053 COMPREHENSIVE METABOLIC PANEL: ICD-10-PCS | Mod: ,,, | Performed by: CLINICAL MEDICAL LABORATORY

## 2023-09-28 PROCEDURE — 80061 LIPID PANEL: ICD-10-PCS | Mod: ,,, | Performed by: CLINICAL MEDICAL LABORATORY

## 2023-09-28 PROCEDURE — 3044F HG A1C LEVEL LT 7.0%: CPT | Mod: ,,, | Performed by: NURSE PRACTITIONER

## 2023-09-28 PROCEDURE — 80061 LIPID PANEL: CPT | Mod: ,,, | Performed by: CLINICAL MEDICAL LABORATORY

## 2023-09-28 PROCEDURE — 90686 FLU VACCINE (QUAD) GREATER THAN OR EQUAL TO 3YO PRESERVATIVE FREE IM: ICD-10-PCS | Mod: ,,, | Performed by: NURSE PRACTITIONER

## 2023-09-28 PROCEDURE — 82570 MICROALBUMIN / CREATININE RATIO URINE: ICD-10-PCS | Mod: ,,, | Performed by: CLINICAL MEDICAL LABORATORY

## 2023-09-28 PROCEDURE — 1160F RVW MEDS BY RX/DR IN RCRD: CPT | Mod: ,,, | Performed by: NURSE PRACTITIONER

## 2023-09-28 PROCEDURE — 82570 ASSAY OF URINE CREATININE: CPT | Mod: ,,, | Performed by: CLINICAL MEDICAL LABORATORY

## 2023-09-28 PROCEDURE — G0008 ADMIN INFLUENZA VIRUS VAC: HCPCS | Mod: ,,, | Performed by: NURSE PRACTITIONER

## 2023-09-28 PROCEDURE — 82043 MICROALBUMIN / CREATININE RATIO URINE: ICD-10-PCS | Mod: ,,, | Performed by: CLINICAL MEDICAL LABORATORY

## 2023-09-28 PROCEDURE — 3079F PR MOST RECENT DIASTOLIC BLOOD PRESSURE 80-89 MM HG: ICD-10-PCS | Mod: ,,, | Performed by: NURSE PRACTITIONER

## 2023-09-28 PROCEDURE — 3008F BODY MASS INDEX DOCD: CPT | Mod: ,,, | Performed by: NURSE PRACTITIONER

## 2023-09-28 PROCEDURE — G0008 FLU VACCINE (QUAD) GREATER THAN OR EQUAL TO 3YO PRESERVATIVE FREE IM: ICD-10-PCS | Mod: ,,, | Performed by: NURSE PRACTITIONER

## 2023-09-28 PROCEDURE — 1159F PR MEDICATION LIST DOCUMENTED IN MEDICAL RECORD: ICD-10-PCS | Mod: ,,, | Performed by: NURSE PRACTITIONER

## 2023-09-28 PROCEDURE — 3044F PR MOST RECENT HEMOGLOBIN A1C LEVEL <7.0%: ICD-10-PCS | Mod: ,,, | Performed by: NURSE PRACTITIONER

## 2023-09-28 PROCEDURE — 99213 PR OFFICE/OUTPT VISIT, EST, LEVL III, 20-29 MIN: ICD-10-PCS | Mod: ,,, | Performed by: NURSE PRACTITIONER

## 2023-09-28 RX ORDER — ASPIRIN 81 MG/1
81 TABLET ORAL DAILY
Qty: 90 TABLET | Refills: 3 | Status: SHIPPED | OUTPATIENT
Start: 2023-09-28

## 2023-09-28 RX ORDER — LOVASTATIN 40 MG/1
40 TABLET ORAL NIGHTLY
Qty: 90 TABLET | Refills: 1 | Status: SHIPPED | OUTPATIENT
Start: 2023-09-28

## 2023-09-28 RX ORDER — ERGOCALCIFEROL 1.25 MG/1
50000 CAPSULE ORAL
Qty: 90 CAPSULE | Refills: 3 | Status: SHIPPED | OUTPATIENT
Start: 2023-09-28

## 2023-09-28 RX ORDER — TRAZODONE HYDROCHLORIDE 100 MG/1
100 TABLET ORAL NIGHTLY PRN
Qty: 90 TABLET | Refills: 1 | Status: SHIPPED | OUTPATIENT
Start: 2023-09-28

## 2023-09-28 RX ORDER — ESOMEPRAZOLE MAGNESIUM 40 MG/1
40 CAPSULE, DELAYED RELEASE ORAL DAILY
Qty: 90 CAPSULE | Refills: 1 | Status: SHIPPED | OUTPATIENT
Start: 2023-09-28 | End: 2024-03-25 | Stop reason: SDUPTHER

## 2023-09-28 RX ORDER — GLIPIZIDE 5 MG/1
5 TABLET ORAL 2 TIMES DAILY WITH MEALS
Qty: 180 TABLET | Refills: 1 | Status: SHIPPED | OUTPATIENT
Start: 2023-09-28 | End: 2024-03-25 | Stop reason: SDUPTHER

## 2023-09-28 RX ORDER — HYDROCHLOROTHIAZIDE 25 MG/1
25 TABLET ORAL DAILY
Qty: 90 TABLET | Refills: 1 | Status: SHIPPED | OUTPATIENT
Start: 2023-09-28 | End: 2024-03-25 | Stop reason: SDUPTHER

## 2023-09-28 RX ORDER — METFORMIN HYDROCHLORIDE 1000 MG/1
1000 TABLET ORAL 2 TIMES DAILY
Qty: 180 TABLET | Refills: 1 | Status: SHIPPED | OUTPATIENT
Start: 2023-09-28 | End: 2024-03-25 | Stop reason: SDUPTHER

## 2023-09-28 NOTE — ASSESSMENT & PLAN NOTE
"Lab Results   Component Value Date    CHOL 190 03/28/2023    CHOL 205 (H) 11/23/2022    CHOL 197 08/10/2022     Lab Results   Component Value Date    HDL 62 (H) 03/28/2023    HDL 75 (H) 11/23/2022    HDL 64 (H) 08/10/2022     Lab Results   Component Value Date    LDLCALC 104 03/28/2023    LDLCALC 108 11/23/2022    LDLCALC 118 08/10/2022     No results found for: "DLDL"  Lab Results   Component Value Date    TRIG 119 03/28/2023    TRIG 109 11/23/2022    TRIG 74 08/10/2022       f1   Lab Results   Component Value Date    CHOLHDL 3.1 03/28/2023    CHOLHDL 2.7 11/23/2022    CHOLHDL 3.1 08/10/2022     The current medical regimen is effective;  continue present plan and medications.    "

## 2023-09-28 NOTE — ASSESSMENT & PLAN NOTE
BP Readings from Last 3 Encounters:   09/28/23 125/84   08/07/23 110/76   05/04/23 138/80     The current medical regimen is effective;  continue present plan and medications.

## 2023-09-28 NOTE — PROGRESS NOTES
ALEX Rodriguez   [facility] [address] [place] [address]  [phone]      PATIENT NAME: Hailey Duval  : 1965  DATE: 23  MRN: [id number]      Billing Provider: ALEX Rodriguez  Level of Service:   Patient PCP Information       Provider PCP Type    ALEX Rodriguez General            Reason for Visit / Chief Complaint: Follow-up, Hypertension, Hyperlipidemia, and Diabetes       Update PCP  Update Chief Complaint         History of Present Illness / Problem Focused Workflow     Hailey Duval presents to the clinic with Follow-up, Hypertension, Hyperlipidemia, and Diabetes     Pt presents for routine follow up with lab and med refills. Overall doing well.      BP appears  controlled today. Home meds reviewed  The current medical regimen is effective;  continue present plan and medications. Recommended patient to check home readings to monitor and see me for followup as scheduled or sooner as needed.   Discussed sodium restriction, maintaining ideal body weight and regular exercise program as physiologic means to continue to achieve blood pressure control in addition to medication compliance.  Patient was educated that both decongestant and anti-inflammatory medication may raise blood pressure.    Discussed need for low fat/low cholesterol diet, regular exercise, and weight control.   Cardiovascular risk and specific lipid/LDL goals reviewed.      Diabetes treatment goals: (unless otherwise indicated due to risk factor stratification)  To achieve normal or near normal glucose levels.  Fasting glucose:   Before meals: 100-140  2 hours after meal:less 130  Bedtime goal glucose > 100            Review of Systems     Review of Systems   Constitutional:  Negative for fatigue and fever.   HENT:  Negative for nasal congestion and sore throat.    Eyes:  Negative for visual disturbance.    Respiratory:  Negative for chest tightness and shortness of breath.    Cardiovascular:  Negative for chest pain and leg swelling.   Gastrointestinal:  Negative for abdominal pain and change in bowel habit.   Endocrine: Negative for polydipsia, polyphagia and polyuria.   Genitourinary:  Negative for dysuria and hematuria.   Musculoskeletal:  Negative for back pain and leg pain.   Integumentary:  Negative for rash.   Neurological:  Negative for dizziness, syncope, weakness and light-headedness.        Medical / Social / Family History     Past Medical History:   Diagnosis Date    Anemia     Anemia, vitamin B12 deficiency     Chronic idiopathic constipation     chronic kidney disease     Depression     Diabetes mellitus, type 2     Dysphagia     GERD (gastroesophageal reflux disease)     Hyperlipidemia     Hypertension     Left ventricular hypertrophy     Nonrheumatic tricuspid (valve) insufficiency     Obese     Osteoarthritis of knee, unspecified     Ulcer of heel and midfoot        Past Surgical History:   Procedure Laterality Date     SECTION      FOOT SURGERY      JOINT REPLACEMENT Right     Hip    OTHER SURGICAL HISTORY      Foot and Toe surgery procedure screws in right foot        Social History  Ms. Duval  reports that she has never smoked. She has never used smokeless tobacco. She reports current drug use. Drug: Marijuana. She reports that she does not drink alcohol.    Family History  Ms. Duval's family history includes Breast cancer in her sister; Diabetes in her mother; Glaucoma in an other family member; Heart disease in an other family member; Hyperlipidemia in an other family member; Hypertension in her father and mother.    Medications and Allergies     Medications  Outpatient Medications Marked as Taking for the 23 encounter (Office Visit) with Raiza David ACNP   Medication Sig Dispense Refill    alcohol swabs (BD ALCOHOL SWABS) PadM Use as directed 300 each 4    blood-glucose  meter (ACCU-CHEK GUIDE GLUCOSE METER) Misc Use as directed 1 each 3    cariprazine (VRAYLAR) 1.5 mg Cap Take 1 capsule (1.5 mg total) by mouth once daily. 90 capsule 1    diclofenac sodium (VOLTAREN) 1 % Gel Apply 2 g topically 4 (four) times daily. 200 g 0    HYDROcodone-acetaminophen (NORCO)  mg per tablet Take 1 tablet by mouth daily as needed.       ibuprofen (ADVIL,MOTRIN) 600 MG tablet Take 1 tablet (600 mg total) by mouth every 6 (six) hours as needed for Pain. 30 tablet 0    semaglutide (OZEMPIC) 2 mg/dose (8 mg/3 mL) PnIj Inject 2 mg into the skin every 7 days. 3 each 1       Allergies  Review of patient's allergies indicates:  No Known Allergies    Physical Examination     Vitals:    09/28/23 0823   BP: 125/84   Pulse: 74   Resp: 16   Temp: 98.1 °F (36.7 °C)     Physical Exam  Eyes:      Pupils: Pupils are equal, round, and reactive to light.   Cardiovascular:      Rate and Rhythm: Normal rate and regular rhythm.      Pulses:           Dorsalis pedis pulses are 2+ on the right side and 2+ on the left side.      Heart sounds: Normal heart sounds. No murmur heard.  Pulmonary:      Breath sounds: Normal breath sounds. No wheezing, rhonchi or rales.   Abdominal:      General: Bowel sounds are normal.   Musculoskeletal:         General: No swelling.      Cervical back: Normal range of motion and neck supple.   Feet:      Right foot:      Protective Sensation: 10 sites tested.  10 sites sensed.      Skin integrity: Skin integrity normal.      Toenail Condition: Right toenails are normal.      Left foot:      Protective Sensation: 10 sites tested.  10 sites sensed.      Skin integrity: Skin integrity normal.      Comments: Protective Sensation (w/ 10 gram monofilament):  Right: Intact  Left: Intact    Visual Inspection:  Normal -  Bilateral    Pedal Pulses:   Right: Present  Left: Present    Posterior Tibialis Pulses:   Right:Present  Left: Present     Skin:     General: Skin is warm and dry.    Neurological:      Mental Status: She is alert and oriented to person, place, and time.          Lab Results   Component Value Date    WBC 7.73 05/04/2022    HGB 11.6 (L) 05/04/2022    HCT 38.4 05/04/2022    MCV 83.5 05/04/2022     05/04/2022        Sodium   Date Value Ref Range Status   03/28/2023 136 136 - 145 mmol/L Final     Potassium   Date Value Ref Range Status   03/28/2023 3.4 (L) 3.5 - 5.1 mmol/L Final     Chloride   Date Value Ref Range Status   03/28/2023 102 98 - 107 mmol/L Final     CO2   Date Value Ref Range Status   03/28/2023 27 21 - 32 mmol/L Final     Glucose   Date Value Ref Range Status   03/28/2023 141 (H) 74 - 106 mg/dL Final     BUN   Date Value Ref Range Status   03/28/2023 18 7 - 18 mg/dL Final     Creatinine   Date Value Ref Range Status   03/28/2023 1.01 0.55 - 1.02 mg/dL Final     Calcium   Date Value Ref Range Status   03/28/2023 9.4 8.5 - 10.1 mg/dL Final     Total Protein   Date Value Ref Range Status   03/28/2023 7.4 6.4 - 8.2 g/dL Final     Albumin   Date Value Ref Range Status   03/28/2023 3.4 (L) 3.5 - 5.0 g/dL Final     Bilirubin, Total   Date Value Ref Range Status   03/28/2023 0.2 >0.0 - 1.2 mg/dL Final     Alk Phos   Date Value Ref Range Status   03/28/2023 103 46 - 118 U/L Final     AST   Date Value Ref Range Status   03/28/2023 10 (L) 15 - 37 U/L Final     ALT   Date Value Ref Range Status   03/28/2023 17 13 - 56 U/L Final     Anion Gap   Date Value Ref Range Status   03/28/2023 10 7 - 16 mmol/L Final     eGFR   Date Value Ref Range Status   03/28/2023 65 >=60 mL/min/1.73m² Final      Lab Results   Component Value Date    HGBA1C 6.3 03/28/2023      Lab Results   Component Value Date    CHOL 190 03/28/2023    CHOL 205 (H) 11/23/2022    CHOL 197 08/10/2022     Lab Results   Component Value Date    HDL 62 (H) 03/28/2023    HDL 75 (H) 11/23/2022    HDL 64 (H) 08/10/2022     Lab Results   Component Value Date    LDLCALC 104 03/28/2023    LDLCALC 108 11/23/2022    LDLCALC  "118 08/10/2022     No results found for: "DLDL"  Lab Results   Component Value Date    TRIG 119 03/28/2023    TRIG 109 11/23/2022    TRIG 74 08/10/2022     Lab Results   Component Value Date    CHOLHDL 3.1 03/28/2023    CHOLHDL 2.7 11/23/2022    CHOLHDL 3.1 08/10/2022      Lab Results   Component Value Date    TSH 1.690 02/01/2022        Assessment and Plan (including Health Maintenance)      Problem List  Smart Sets  Document Outside HM   :    Plan:     1. Essential hypertension  Assessment & Plan:  BP Readings from Last 3 Encounters:   09/28/23 125/84   08/07/23 110/76   05/04/23 138/80     The current medical regimen is effective;  continue present plan and medications.      Orders:  -     Comprehensive Metabolic Panel; Future; Expected date: 09/28/2023  -     Microalbumin/Creatinine Ratio, Urine; Future; Expected date: 09/28/2023  -     aspirin (ECOTRIN) 81 MG EC tablet; Take 1 tablet (81 mg total) by mouth once daily.  Dispense: 90 tablet; Refill: 3  -     hydroCHLOROthiazide (HYDRODIURIL) 25 MG tablet; Take 1 tablet (25 mg total) by mouth once daily.  Dispense: 90 tablet; Refill: 1    2. Hyperlipidemia associated with type 2 diabetes mellitus  Assessment & Plan:  Lab Results   Component Value Date    CHOL 190 03/28/2023    CHOL 205 (H) 11/23/2022    CHOL 197 08/10/2022     Lab Results   Component Value Date    HDL 62 (H) 03/28/2023    HDL 75 (H) 11/23/2022    HDL 64 (H) 08/10/2022     Lab Results   Component Value Date    LDLCALC 104 03/28/2023    LDLCALC 108 11/23/2022    LDLCALC 118 08/10/2022     No results found for: "DLDL"  Lab Results   Component Value Date    TRIG 119 03/28/2023    TRIG 109 11/23/2022    TRIG 74 08/10/2022       f1   Lab Results   Component Value Date    CHOLHDL 3.1 03/28/2023    CHOLHDL 2.7 11/23/2022    CHOLHDL 3.1 08/10/2022     The current medical regimen is effective;  continue present plan and medications.      Orders:  -     Lipid Panel; Future; Expected date: 09/28/2023  -     " lovastatin (MEVACOR) 40 MG tablet; Take 1 tablet (40 mg total) by mouth every evening.  Dispense: 90 tablet; Refill: 1    3. Type 2 diabetes mellitus without complication, without long-term current use of insulin  Assessment & Plan:  Lab Results   Component Value Date    HGBA1C 6.3 03/28/2023    HGBA1C 6.3 11/23/2022    HGBA1C 6.1 08/10/2022     Lab Results   Component Value Date    MICROALBUR 1.0 08/10/2022    LDLCALC 104 03/28/2023    CREATININE 1.01 03/28/2023     Reports BG ranging 90-150s  Goal a1c less 7  Cont home meds    Orders:  -     Hemoglobin A1C; Future; Expected date: 09/28/2023  -     glipiZIDE (GLUCOTROL) 5 MG tablet; Take 1 tablet (5 mg total) by mouth 2 (two) times daily with meals.  Dispense: 180 tablet; Refill: 1  -     metFORMIN (GLUCOPHAGE) 1000 MG tablet; Take 1 tablet (1,000 mg total) by mouth 2 (two) times daily.  Dispense: 180 tablet; Refill: 1  -     Microalbumin/Creatinine Ratio, Urine    4. Essential hypertension  Comments:  well controlled  Assessment & Plan:  BP Readings from Last 3 Encounters:   09/28/23 125/84   08/07/23 110/76   05/04/23 138/80     The current medical regimen is effective;  continue present plan and medications.      Orders:  -     Comprehensive Metabolic Panel; Future; Expected date: 09/28/2023  -     Microalbumin/Creatinine Ratio, Urine; Future; Expected date: 09/28/2023  -     aspirin (ECOTRIN) 81 MG EC tablet; Take 1 tablet (81 mg total) by mouth once daily.  Dispense: 90 tablet; Refill: 3  -     hydroCHLOROthiazide (HYDRODIURIL) 25 MG tablet; Take 1 tablet (25 mg total) by mouth once daily.  Dispense: 90 tablet; Refill: 1    5. Vitamin D deficiency  -     ergocalciferol (ERGOCALCIFEROL) 50,000 unit Cap; Take 1 capsule (50,000 Units total) by mouth every 7 days.  Dispense: 90 capsule; Refill: 3    6. Type 2 diabetes mellitus without complication, without long-term current use of insulin  Comments:  cont home meds    Assessment & Plan:  Lab Results   Component  Value Date    HGBA1C 6.3 03/28/2023    HGBA1C 6.3 11/23/2022    HGBA1C 6.1 08/10/2022     Lab Results   Component Value Date    MICROALBUR 1.0 08/10/2022    LDLCALC 104 03/28/2023    CREATININE 1.01 03/28/2023     Reports BG ranging 90-150s  Goal a1c less 7  Cont home meds    Orders:  -     Hemoglobin A1C; Future; Expected date: 09/28/2023  -     glipiZIDE (GLUCOTROL) 5 MG tablet; Take 1 tablet (5 mg total) by mouth 2 (two) times daily with meals.  Dispense: 180 tablet; Refill: 1  -     metFORMIN (GLUCOPHAGE) 1000 MG tablet; Take 1 tablet (1,000 mg total) by mouth 2 (two) times daily.  Dispense: 180 tablet; Refill: 1  -     Microalbumin/Creatinine Ratio, Urine    7. Influenza vaccination administered at current visit  -     Influenza - Quadrivalent (PF)    8. Bipolar depression  Assessment & Plan:  Stable  Cont home meds      Other orders  -     esomeprazole (NEXIUM) 40 MG capsule; Take 1 capsule (40 mg total) by mouth once daily.  Dispense: 90 capsule; Refill: 1  -     traZODone (DESYREL) 100 MG tablet; Take 1 tablet (100 mg total) by mouth nightly as needed for Insomnia.  Dispense: 90 tablet; Refill: 1         There are no Patient Instructions on file for this visit.     Health Maintenance Due   Topic Date Due    Foot Exam  09/11/2022    Eye Exam  11/18/2022    Influenza Vaccine (1) 09/01/2023    Mammogram  09/12/2023    Diabetes Urine Screening  08/10/2023    Hemoglobin A1c  09/28/2023         Health Maintenance Topics with due status: Not Due       Topic Last Completion Date    Colorectal Cancer Screening 06/09/2019    Cervical Cancer Screening 09/15/2021    Lipid Panel 03/28/2023    Low Dose Statin 09/28/2023       Future Appointments   Date Time Provider Department Center   11/7/2023  8:45 AM Bridget Smith ACNP [facility]   1/9/2024  7:45 AM [facility]   3/28/2024  8:40 AM Raiza David ACNP [facility]   8/8/2024 11:00 AM [facility] NURSE, [facility]  [facility]            Signature:  Raiza David, [facility] [address] [place] [address]  [phone]    Date of encounter: 9/28/23

## 2023-09-28 NOTE — ASSESSMENT & PLAN NOTE
Lab Results   Component Value Date    HGBA1C 6.3 03/28/2023    HGBA1C 6.3 11/23/2022    HGBA1C 6.1 08/10/2022     Lab Results   Component Value Date    MICROALBUR 1.0 08/10/2022    LDLCALC 104 03/28/2023    CREATININE 1.01 03/28/2023     Reports BG ranging 90-150s  Goal a1c less 7  Cont home meds

## 2023-09-29 LAB
ALBUMIN SERPL BCP-MCNC: 3.2 G/DL (ref 3.5–5)
ALBUMIN/GLOB SERPL: 0.8 {RATIO}
ALP SERPL-CCNC: 99 U/L (ref 46–118)
ALT SERPL W P-5'-P-CCNC: 23 U/L (ref 13–56)
ANION GAP SERPL CALCULATED.3IONS-SCNC: 9 MMOL/L (ref 7–16)
AST SERPL W P-5'-P-CCNC: 16 U/L (ref 15–37)
BILIRUB SERPL-MCNC: 0.1 MG/DL (ref ?–1.2)
BUN SERPL-MCNC: 21 MG/DL (ref 7–18)
BUN/CREAT SERPL: 24 (ref 6–20)
CALCIUM SERPL-MCNC: 9.5 MG/DL (ref 8.5–10.1)
CHLORIDE SERPL-SCNC: 108 MMOL/L (ref 98–107)
CHOLEST SERPL-MCNC: 197 MG/DL (ref 0–200)
CHOLEST/HDLC SERPL: 2.9 {RATIO}
CO2 SERPL-SCNC: 26 MMOL/L (ref 21–32)
CREAT SERPL-MCNC: 0.86 MG/DL (ref 0.55–1.02)
CREAT UR-MCNC: 68 MG/DL (ref 28–219)
EGFR (NO RACE VARIABLE) (RUSH/TITUS): 79 ML/MIN/1.73M2
EST. AVERAGE GLUCOSE BLD GHB EST-MCNC: 117 MG/DL
GLOBULIN SER-MCNC: 3.8 G/DL (ref 2–4)
GLUCOSE SERPL-MCNC: 80 MG/DL (ref 74–106)
HBA1C MFR BLD HPLC: 6.1 % (ref 4.5–6.6)
HDLC SERPL-MCNC: 68 MG/DL (ref 40–60)
LDLC SERPL CALC-MCNC: 101 MG/DL
LDLC/HDLC SERPL: 1.5 {RATIO}
MICROALBUMIN UR-MCNC: <0.5 MG/DL (ref 0–2.8)
MICROALBUMIN/CREAT RATIO PNL UR: <7.4 MG/G (ref 0–30)
NONHDLC SERPL-MCNC: 129 MG/DL
POTASSIUM SERPL-SCNC: 4.2 MMOL/L (ref 3.5–5.1)
PROT SERPL-MCNC: 7 G/DL (ref 6.4–8.2)
SODIUM SERPL-SCNC: 139 MMOL/L (ref 136–145)
TRIGL SERPL-MCNC: 140 MG/DL (ref 35–150)
VLDLC SERPL-MCNC: 28 MG/DL

## 2023-10-11 ENCOUNTER — PATIENT OUTREACH (OUTPATIENT)
Dept: FAMILY MEDICINE | Facility: CLINIC | Age: 58
End: 2023-10-11
Payer: MEDICARE

## 2023-10-18 RX ORDER — SEMAGLUTIDE 2.68 MG/ML
2 INJECTION, SOLUTION SUBCUTANEOUS
Qty: 4 EACH | Refills: 2 | Status: SHIPPED | OUTPATIENT
Start: 2023-10-18 | End: 2024-02-12 | Stop reason: SDUPTHER

## 2023-11-09 ENCOUNTER — TELEPHONE (OUTPATIENT)
Dept: FAMILY MEDICINE | Facility: CLINIC | Age: 58
End: 2023-11-09
Payer: MEDICARE

## 2023-11-09 NOTE — TELEPHONE ENCOUNTER
----- Message from Magali Garcia sent at 11/9/2023  9:23 AM CST -----  Pt anderson had Ozempic in 2 weeks due to pharmacy being out. [phone]

## 2023-11-09 NOTE — TELEPHONE ENCOUNTER
Instructed pt to call around to other pharmacies and see if Ozempic available. If so have presc transferred

## 2024-01-08 ENCOUNTER — HOSPITAL ENCOUNTER (OUTPATIENT)
Dept: RADIOLOGY | Facility: HOSPITAL | Age: 59
Discharge: HOME | End: 2024-01-08
Attending: NURSE PRACTITIONER
Payer: MEDICARE

## 2024-01-08 VITALS — HEIGHT: 57 IN | BODY MASS INDEX: 42.28 KG/M2 | WEIGHT: 196 LBS

## 2024-01-08 DIAGNOSIS — Z12.31 VISIT FOR SCREENING MAMMOGRAM: Primary | ICD-10-CM

## 2024-01-08 DIAGNOSIS — Z12.31 BREAST CANCER SCREENING BY MAMMOGRAM: ICD-10-CM

## 2024-01-08 PROCEDURE — 77067 SCR MAMMO BI INCL CAD: CPT | Mod: TC

## 2024-01-09 DIAGNOSIS — Z71.89 COMPLEX CARE COORDINATION: ICD-10-CM

## 2024-02-12 RX ORDER — SEMAGLUTIDE 2.68 MG/ML
2 INJECTION, SOLUTION SUBCUTANEOUS
Qty: 4 EACH | Refills: 2 | Status: SHIPPED | OUTPATIENT
Start: 2024-02-12 | End: 2024-05-01 | Stop reason: SDUPTHER

## 2024-02-12 NOTE — TELEPHONE ENCOUNTER
----- Message from Magali Garcia sent at 2/12/2024  9:22 AM CST -----  Pt is out of Ozempic. Needs today please.

## 2024-02-28 ENCOUNTER — OFFICE VISIT (OUTPATIENT)
Dept: CARDIOLOGY | Facility: CLINIC | Age: 59
End: 2024-02-28
Payer: MEDICARE

## 2024-02-28 VITALS
HEART RATE: 83 BPM | HEIGHT: 61 IN | SYSTOLIC BLOOD PRESSURE: 104 MMHG | BODY MASS INDEX: 36.37 KG/M2 | DIASTOLIC BLOOD PRESSURE: 70 MMHG | OXYGEN SATURATION: 99 % | WEIGHT: 192.63 LBS

## 2024-02-28 DIAGNOSIS — E11.69 HYPERLIPIDEMIA ASSOCIATED WITH TYPE 2 DIABETES MELLITUS: ICD-10-CM

## 2024-02-28 DIAGNOSIS — I10 ESSENTIAL HYPERTENSION: ICD-10-CM

## 2024-02-28 DIAGNOSIS — I51.7 LVH (LEFT VENTRICULAR HYPERTROPHY): ICD-10-CM

## 2024-02-28 DIAGNOSIS — I10 HYPERTENSION, UNSPECIFIED TYPE: Primary | ICD-10-CM

## 2024-02-28 DIAGNOSIS — E78.5 HYPERLIPIDEMIA ASSOCIATED WITH TYPE 2 DIABETES MELLITUS: ICD-10-CM

## 2024-02-28 PROCEDURE — 3074F SYST BP LT 130 MM HG: CPT | Mod: CPTII,,, | Performed by: NURSE PRACTITIONER

## 2024-02-28 PROCEDURE — 3078F DIAST BP <80 MM HG: CPT | Mod: CPTII,,, | Performed by: NURSE PRACTITIONER

## 2024-02-28 PROCEDURE — 99215 OFFICE O/P EST HI 40 MIN: CPT | Mod: PBBFAC | Performed by: NURSE PRACTITIONER

## 2024-02-28 PROCEDURE — 3008F BODY MASS INDEX DOCD: CPT | Mod: CPTII,,, | Performed by: NURSE PRACTITIONER

## 2024-02-28 PROCEDURE — 1160F RVW MEDS BY RX/DR IN RCRD: CPT | Mod: CPTII,,, | Performed by: NURSE PRACTITIONER

## 2024-02-28 PROCEDURE — 1159F MED LIST DOCD IN RCRD: CPT | Mod: CPTII,,, | Performed by: NURSE PRACTITIONER

## 2024-02-28 PROCEDURE — 93005 ELECTROCARDIOGRAM TRACING: CPT | Mod: PBBFAC | Performed by: INTERNAL MEDICINE

## 2024-02-28 PROCEDURE — 93010 ELECTROCARDIOGRAM REPORT: CPT | Mod: S$PBB,,, | Performed by: INTERNAL MEDICINE

## 2024-02-28 PROCEDURE — 99214 OFFICE O/P EST MOD 30 MIN: CPT | Mod: S$PBB,,, | Performed by: NURSE PRACTITIONER

## 2024-02-28 NOTE — ASSESSMENT & PLAN NOTE
Lab Results   Component Value Date    CHOL 197 09/28/2023    CHOL 190 03/28/2023    CHOL 205 (H) 11/23/2022     Lab Results   Component Value Date    HDL 68 (H) 09/28/2023    HDL 62 (H) 03/28/2023    HDL 75 (H) 11/23/2022     Lab Results   Component Value Date    LDLCALC 101 09/28/2023    LDLCALC 104 03/28/2023    LDLCALC 108 11/23/2022     Lab Results   Component Value Date    TRIG 140 09/28/2023    TRIG 119 03/28/2023    TRIG 109 11/23/2022       Lab Results   Component Value Date    CHOLHDL 2.9 09/28/2023    CHOLHDL 3.1 03/28/2023    CHOLHDL 2.7 11/23/2022     Lipids followed by PCP  Lovastatin

## 2024-02-28 NOTE — PROGRESS NOTES
PCP: Raiza David ACNP    Referring Provider:     Subjective:   Hailey Duval is a 58 y.o. female with hx of LVH with impaired relaxation (echo 2017), HTN, DM, and HLD,  who presents for routine follow up. She states that she is doing well and denies complaints.    5/4/2023 presents for routine follow up. She is doing well and denies any complaints.         Fhx:  Family History   Problem Relation Age of Onset    Diabetes Mother     Hypertension Mother     Hypertension Father     Glaucoma Other     Heart disease Other     Hyperlipidemia Other     Breast cancer Sister      Shx:   Social History     Socioeconomic History    Marital status: Single   Tobacco Use    Smoking status: Never    Smokeless tobacco: Never   Substance and Sexual Activity    Alcohol use: Never    Drug use: Yes     Types: Marijuana     Comment:  per pt stoppled crack x20yrs      Sexual activity: Yes     Social Determinants of Health     Financial Resource Strain: Low Risk  (5/4/2022)    Overall Financial Resource Strain (CARDIA)     Difficulty of Paying Living Expenses: Not hard at all   Food Insecurity: No Food Insecurity (5/4/2022)    Hunger Vital Sign     Worried About Running Out of Food in the Last Year: Never true     Ran Out of Food in the Last Year: Never true   Transportation Needs: No Transportation Needs (5/4/2022)    PRAPARE - Transportation     Lack of Transportation (Medical): No     Lack of Transportation (Non-Medical): No   Physical Activity: Inactive (5/4/2022)    Exercise Vital Sign     Days of Exercise per Week: 0 days     Minutes of Exercise per Session: 0 min   Stress: No Stress Concern Present (5/4/2022)    [employer] - Occupational Stress Questionnaire     Feeling of Stress : Not at all   Social Connections: Moderately Integrated (5/4/2022)    Social Connection and Isolation Panel [NHANES]     Frequency of Communication with Friends and Family: Once a week     Frequency of Social  Gatherings with Friends and Family: Once a week     Attends Sabianism Services: 1 to 4 times per year     Active Member of Clubs or Organizations: Yes     Attends Club or Organization Meetings: 1 to 4 times per year     Marital Status:    Housing Stability: Unknown (5/4/2022)    Housing Stability Vital Sign     Unable to Pay for Housing in the Last Year: No     Unstable Housing in the Last Year: No       EKG 2/28/2024 RSR with HR 76 bpm; low voltage QRS; NSTWA; when compared with EKG 5/4/2023 no significant change was found  5/4/2023 RSR with HR 73 bpm; NSTWA  8/4/2022 RSR with HR 67 bpm; NSTWA    ECHO 2/28/2021  Normal chamber size, wall motion and systolic function with EF >55%  Concentric LVH with impaired relaxation  Mild TR with RVSP 33 mmHg  Mild PI    Lexiscan 5/22/2017  No definite findings of significant reversible ischemia detected by the study  Normal LV size and systolic function  LVEF 71%      Lab Results   Component Value Date     09/28/2023    K 4.2 09/28/2023     (H) 09/28/2023    CO2 26 09/28/2023    BUN 21 (H) 09/28/2023    CREATININE 0.86 09/28/2023    CALCIUM 9.5 09/28/2023    ANIONGAP 9 09/28/2023    ESTGFRAFRICA 68 11/16/2021    EGFRNONAA 62 05/04/2022       Lab Results   Component Value Date    CHOL 197 09/28/2023    CHOL 190 03/28/2023    CHOL 205 (H) 11/23/2022     Lab Results   Component Value Date    HDL 68 (H) 09/28/2023    HDL 62 (H) 03/28/2023    HDL 75 (H) 11/23/2022     Lab Results   Component Value Date    LDLCALC 101 09/28/2023    LDLCALC 104 03/28/2023    LDLCALC 108 11/23/2022     Lab Results   Component Value Date    TRIG 140 09/28/2023    TRIG 119 03/28/2023    TRIG 109 11/23/2022     Lab Results   Component Value Date    CHOLHDL 2.9 09/28/2023    CHOLHDL 3.1 03/28/2023    CHOLHDL 2.7 11/23/2022       Lab Results   Component Value Date    WBC 7.73 05/04/2022    HGB 11.6 (L) 05/04/2022    HCT 38.4 05/04/2022    MCV 83.5 05/04/2022     05/04/2022            Current Outpatient Medications:     alcohol swabs (BD ALCOHOL SWABS) Pad, Use as directed, Disp: 300 each, Rfl: 4    aspirin (ECOTRIN) 81 MG EC tablet, Take 1 tablet (81 mg total) by mouth once daily., Disp: 90 tablet, Rfl: 3    blood sugar diagnostic Strp, Use twice daily for glucose monitoring, Disp: 150 strip, Rfl: 4    blood-glucose meter (ACCU-CHEK GUIDE GLUCOSE METER) Misc, Use as directed, Disp: 1 each, Rfl: 3    cariprazine (VRAYLAR) 1.5 mg Cap, Take 1 capsule (1.5 mg total) by mouth once daily., Disp: 90 capsule, Rfl: 1    ergocalciferol (ERGOCALCIFEROL) 50,000 unit Cap, Take 1 capsule (50,000 Units total) by mouth every 7 days., Disp: 90 capsule, Rfl: 3    esomeprazole (NEXIUM) 40 MG capsule, Take 1 capsule (40 mg total) by mouth once daily., Disp: 90 capsule, Rfl: 1    glipiZIDE (GLUCOTROL) 5 MG tablet, Take 1 tablet (5 mg total) by mouth 2 (two) times daily with meals., Disp: 180 tablet, Rfl: 1    hydroCHLOROthiazide (HYDRODIURIL) 25 MG tablet, Take 1 tablet (25 mg total) by mouth once daily., Disp: 90 tablet, Rfl: 1    lancets 32 gauge Misc, Use twice daily for monitoring blood glucose, Disp: 100 each, Rfl: 5    metFORMIN (GLUCOPHAGE) 1000 MG tablet, Take 1 tablet (1,000 mg total) by mouth 2 (two) times daily., Disp: 180 tablet, Rfl: 1    semaglutide (OZEMPIC) 2 mg/dose (8 mg/3 mL) PnIj, Inject 2 mg into the skin every 7 days., Disp: 4 each, Rfl: 2    diclofenac sodium (VOLTAREN) 1 % Gel, Apply 2 g topically 4 (four) times daily., Disp: 200 g, Rfl: 0    HYDROcodone-acetaminophen (NORCO)  mg per tablet, Take 1 tablet by mouth daily as needed. , Disp: , Rfl:     ibuprofen (ADVIL,MOTRIN) 600 MG tablet, Take 1 tablet (600 mg total) by mouth every 6 (six) hours as needed for Pain. (Patient not taking: Reported on 2/28/2024), Disp: 30 tablet, Rfl: 0    lovastatin (MEVACOR) 40 MG tablet, Take 1 tablet (40 mg total) by mouth every evening. (Patient not taking: Reported on 2/28/2024), Disp: 90  "tablet, Rfl: 1    traZODone (DESYREL) 100 MG tablet, Take 1 tablet (100 mg total) by mouth nightly as needed for Insomnia. (Patient not taking: Reported on 2/28/2024), Disp: 90 tablet, Rfl: 1  Meds reviewed and reconciled.     Review of Systems   Respiratory:  Negative for shortness of breath.    Cardiovascular:  Negative for chest pain, palpitations, orthopnea, claudication, leg swelling and PND.   Neurological:  Negative for dizziness, loss of consciousness and weakness.           Objective:   /70 (BP Location: Left arm, Patient Position: Sitting)   Pulse 83   Ht 5' 1" (1.549 m)   Wt 87.4 kg (192 lb 9.6 oz)   SpO2 99%   BMI 36.39 kg/m²     Physical Exam  Vitals and nursing note reviewed.   Constitutional:       Appearance: Normal appearance. She is obese.   HENT:      Head: Normocephalic and atraumatic.   Neck:      Vascular: No carotid bruit.   Cardiovascular:      Rate and Rhythm: Normal rate and regular rhythm.      Pulses: Normal pulses.      Heart sounds: Normal heart sounds.   Pulmonary:      Effort: Pulmonary effort is normal.      Breath sounds: Normal breath sounds.   Abdominal:      Palpations: Abdomen is soft.   Musculoskeletal:      Cervical back: Neck supple.      Right lower leg: No edema.      Left lower leg: No edema.   Skin:     General: Skin is warm and dry.      Capillary Refill: Capillary refill takes less than 2 seconds.   Neurological:      General: No focal deficit present.      Mental Status: She is alert.           Assessment:     1. Hypertension, unspecified type  EKG 12-lead    EKG 12-lead      2. LVH (left ventricular hypertrophy)        3. Hyperlipidemia associated with type 2 diabetes mellitus        4. Essential hypertension              Plan:   LVH (left ventricular hypertrophy)  Concentric LVH with impaired relaxation by echo 5/22/20174  Asymptomatic  BP well controlled    Hyperlipidemia associated with type 2 diabetes mellitus  Lab Results   Component Value Date    CHOL " 197 09/28/2023    CHOL 190 03/28/2023    CHOL 205 (H) 11/23/2022     Lab Results   Component Value Date    HDL 68 (H) 09/28/2023    HDL 62 (H) 03/28/2023    HDL 75 (H) 11/23/2022     Lab Results   Component Value Date    LDLCALC 101 09/28/2023    LDLCALC 104 03/28/2023    LDLCALC 108 11/23/2022     Lab Results   Component Value Date    TRIG 140 09/28/2023    TRIG 119 03/28/2023    TRIG 109 11/23/2022       Lab Results   Component Value Date    CHOLHDL 2.9 09/28/2023    CHOLHDL 3.1 03/28/2023    CHOLHDL 2.7 11/23/2022     Lipids followed by PCP  Lovastatin     Essential hypertension  104/70 mmHg    RTC: 6 months

## 2024-02-29 LAB
OHS QRS DURATION: 64 MS
OHS QTC CALCULATION: 393 MS

## 2024-03-28 ENCOUNTER — OFFICE VISIT (OUTPATIENT)
Dept: FAMILY MEDICINE | Facility: CLINIC | Age: 59
End: 2024-03-28
Payer: MEDICARE

## 2024-03-28 VITALS
BODY MASS INDEX: 36.06 KG/M2 | OXYGEN SATURATION: 98 % | RESPIRATION RATE: 16 BRPM | HEART RATE: 84 BPM | HEIGHT: 61 IN | DIASTOLIC BLOOD PRESSURE: 86 MMHG | SYSTOLIC BLOOD PRESSURE: 122 MMHG | TEMPERATURE: 98 F | WEIGHT: 191 LBS

## 2024-03-28 DIAGNOSIS — E66.01 MORBID OBESITY: ICD-10-CM

## 2024-03-28 DIAGNOSIS — E11.69 HYPERLIPIDEMIA ASSOCIATED WITH TYPE 2 DIABETES MELLITUS: ICD-10-CM

## 2024-03-28 DIAGNOSIS — I10 ESSENTIAL HYPERTENSION: Primary | ICD-10-CM

## 2024-03-28 DIAGNOSIS — E78.5 HYPERLIPIDEMIA ASSOCIATED WITH TYPE 2 DIABETES MELLITUS: ICD-10-CM

## 2024-03-28 DIAGNOSIS — Z12.11 SCREENING FOR COLON CANCER: ICD-10-CM

## 2024-03-28 DIAGNOSIS — E11.9 TYPE 2 DIABETES MELLITUS WITHOUT COMPLICATION, WITHOUT LONG-TERM CURRENT USE OF INSULIN: ICD-10-CM

## 2024-03-28 DIAGNOSIS — F31.9 BIPOLAR DEPRESSION: ICD-10-CM

## 2024-03-28 PROBLEM — E11.65 TYPE 2 DIABETES MELLITUS WITH HYPERGLYCEMIA, WITHOUT LONG-TERM CURRENT USE OF INSULIN: Status: RESOLVED | Noted: 2021-05-17 | Resolved: 2024-03-28

## 2024-03-28 LAB
ALBUMIN SERPL BCP-MCNC: 3.6 G/DL (ref 3.5–5)
ALBUMIN/GLOB SERPL: 0.9 {RATIO}
ALP SERPL-CCNC: 105 U/L (ref 46–118)
ALT SERPL W P-5'-P-CCNC: 24 U/L (ref 13–56)
ANION GAP SERPL CALCULATED.3IONS-SCNC: 10 MMOL/L (ref 7–16)
AST SERPL W P-5'-P-CCNC: 16 U/L (ref 15–37)
BASOPHILS # BLD AUTO: 0.02 K/UL (ref 0–0.2)
BASOPHILS NFR BLD AUTO: 0.3 % (ref 0–1)
BILIRUB SERPL-MCNC: 0.4 MG/DL (ref ?–1.2)
BUN SERPL-MCNC: 22 MG/DL (ref 7–18)
BUN/CREAT SERPL: 21 (ref 6–20)
CALCIUM SERPL-MCNC: 9.8 MG/DL (ref 8.5–10.1)
CHLORIDE SERPL-SCNC: 105 MMOL/L (ref 98–107)
CHOLEST SERPL-MCNC: 224 MG/DL (ref 0–200)
CHOLEST/HDLC SERPL: 3 {RATIO}
CO2 SERPL-SCNC: 29 MMOL/L (ref 21–32)
CREAT SERPL-MCNC: 1.05 MG/DL (ref 0.55–1.02)
DIFFERENTIAL METHOD BLD: ABNORMAL
EGFR (NO RACE VARIABLE) (RUSH/TITUS): 62 ML/MIN/1.73M2
EOSINOPHIL # BLD AUTO: 0.1 K/UL (ref 0–0.5)
EOSINOPHIL NFR BLD AUTO: 1.5 % (ref 1–4)
ERYTHROCYTE [DISTWIDTH] IN BLOOD BY AUTOMATED COUNT: 14.5 % (ref 11.5–14.5)
EST. AVERAGE GLUCOSE BLD GHB EST-MCNC: 134 MG/DL
GLOBULIN SER-MCNC: 4.2 G/DL (ref 2–4)
GLUCOSE SERPL-MCNC: 123 MG/DL (ref 74–106)
HBA1C MFR BLD HPLC: 6.3 % (ref 4.5–6.6)
HCT VFR BLD AUTO: 42.2 % (ref 38–47)
HDLC SERPL-MCNC: 74 MG/DL (ref 40–60)
HGB BLD-MCNC: 12.7 G/DL (ref 12–16)
IMM GRANULOCYTES # BLD AUTO: 0.02 K/UL (ref 0–0.04)
IMM GRANULOCYTES NFR BLD: 0.3 % (ref 0–0.4)
LDLC SERPL CALC-MCNC: 136 MG/DL
LDLC/HDLC SERPL: 1.8 {RATIO}
LYMPHOCYTES # BLD AUTO: 2.87 K/UL (ref 1–4.8)
LYMPHOCYTES NFR BLD AUTO: 44.4 % (ref 27–41)
MCH RBC QN AUTO: 26.2 PG (ref 27–31)
MCHC RBC AUTO-ENTMCNC: 30.1 G/DL (ref 32–36)
MCV RBC AUTO: 87.2 FL (ref 80–96)
MONOCYTES # BLD AUTO: 0.59 K/UL (ref 0–0.8)
MONOCYTES NFR BLD AUTO: 9.1 % (ref 2–6)
MPC BLD CALC-MCNC: 11.4 FL (ref 9.4–12.4)
NEUTROPHILS # BLD AUTO: 2.87 K/UL (ref 1.8–7.7)
NEUTROPHILS NFR BLD AUTO: 44.4 % (ref 53–65)
NONHDLC SERPL-MCNC: 150 MG/DL
NRBC # BLD AUTO: 0 X10E3/UL
NRBC, AUTO (.00): 0 %
PLATELET # BLD AUTO: 264 K/UL (ref 150–400)
POTASSIUM SERPL-SCNC: 4 MMOL/L (ref 3.5–5.1)
PROT SERPL-MCNC: 7.8 G/DL (ref 6.4–8.2)
RBC # BLD AUTO: 4.84 M/UL (ref 4.2–5.4)
SODIUM SERPL-SCNC: 140 MMOL/L (ref 136–145)
TRIGL SERPL-MCNC: 70 MG/DL (ref 35–150)
VLDLC SERPL-MCNC: 14 MG/DL
WBC # BLD AUTO: 6.47 K/UL (ref 4.5–11)

## 2024-03-28 PROCEDURE — 80053 COMPREHEN METABOLIC PANEL: CPT | Mod: ,,, | Performed by: CLINICAL MEDICAL LABORATORY

## 2024-03-28 PROCEDURE — 3074F SYST BP LT 130 MM HG: CPT | Mod: ,,, | Performed by: NURSE PRACTITIONER

## 2024-03-28 PROCEDURE — 83036 HEMOGLOBIN GLYCOSYLATED A1C: CPT | Mod: ,,, | Performed by: CLINICAL MEDICAL LABORATORY

## 2024-03-28 PROCEDURE — 85025 COMPLETE CBC W/AUTO DIFF WBC: CPT | Mod: ,,, | Performed by: CLINICAL MEDICAL LABORATORY

## 2024-03-28 PROCEDURE — 3079F DIAST BP 80-89 MM HG: CPT | Mod: ,,, | Performed by: NURSE PRACTITIONER

## 2024-03-28 PROCEDURE — 1160F RVW MEDS BY RX/DR IN RCRD: CPT | Mod: ,,, | Performed by: NURSE PRACTITIONER

## 2024-03-28 PROCEDURE — 1159F MED LIST DOCD IN RCRD: CPT | Mod: ,,, | Performed by: NURSE PRACTITIONER

## 2024-03-28 PROCEDURE — 99214 OFFICE O/P EST MOD 30 MIN: CPT | Mod: ,,, | Performed by: NURSE PRACTITIONER

## 2024-03-28 PROCEDURE — 3008F BODY MASS INDEX DOCD: CPT | Mod: ,,, | Performed by: NURSE PRACTITIONER

## 2024-03-28 PROCEDURE — 80061 LIPID PANEL: CPT | Mod: ,,, | Performed by: CLINICAL MEDICAL LABORATORY

## 2024-03-28 RX ORDER — NYSTATIN 100000 U/G
CREAM TOPICAL 2 TIMES DAILY
Qty: 30 G | Refills: 1 | Status: SHIPPED | OUTPATIENT
Start: 2024-03-28

## 2024-03-28 RX ORDER — ESOMEPRAZOLE MAGNESIUM 40 MG/1
40 CAPSULE, DELAYED RELEASE ORAL DAILY
Qty: 90 CAPSULE | Refills: 1 | Status: SHIPPED | OUTPATIENT
Start: 2024-03-28

## 2024-03-28 RX ORDER — FLUCONAZOLE 100 MG/1
100 TABLET ORAL DAILY
Qty: 5 TABLET | Refills: 0 | Status: SHIPPED | OUTPATIENT
Start: 2024-03-28 | End: 2024-04-02

## 2024-03-28 RX ORDER — GLIPIZIDE 5 MG/1
5 TABLET ORAL 2 TIMES DAILY WITH MEALS
Qty: 180 TABLET | Refills: 1 | Status: SHIPPED | OUTPATIENT
Start: 2024-03-28

## 2024-03-28 RX ORDER — CARIPRAZINE 1.5 MG/1
1.5 CAPSULE, GELATIN COATED ORAL DAILY
Qty: 90 CAPSULE | Refills: 1 | Status: SHIPPED | OUTPATIENT
Start: 2024-03-28

## 2024-03-28 RX ORDER — METFORMIN HYDROCHLORIDE 1000 MG/1
1000 TABLET ORAL 2 TIMES DAILY
Qty: 180 TABLET | Refills: 1 | Status: SHIPPED | OUTPATIENT
Start: 2024-03-28

## 2024-03-28 RX ORDER — HYDROCHLOROTHIAZIDE 25 MG/1
25 TABLET ORAL DAILY
Qty: 90 TABLET | Refills: 1 | Status: SHIPPED | OUTPATIENT
Start: 2024-03-28

## 2024-03-28 NOTE — ASSESSMENT & PLAN NOTE
Lab Results   Component Value Date    HGBA1C 6.1 09/28/2023    HGBA1C 6.3 03/28/2023    HGBA1C 6.3 11/23/2022     Lab Results   Component Value Date    MICROALBUR <0.5 09/28/2023    LDLCALC 101 09/28/2023    CREATININE 0.86 09/28/2023    The current medical regimen is effective;  continue present plan and medications.

## 2024-03-28 NOTE — PROGRESS NOTES
ALEX Rodriguez   [facility] [address] [place] [address]  [phone]      PATIENT NAME: Hailey Duval  : 1965  DATE: 3/28/24  MRN: [id number]      Billing Provider: ALEX Rodriguez  Level of Service:   Patient PCP Information       Provider PCP Type    ALEX Rodriguez General            Reason for Visit / Chief Complaint: Follow-up, Hypertension, Hyperlipidemia, and Diabetes (6 mos)       Update PCP  Update Chief Complaint         History of Present Illness / Problem Focused Workflow     Hailey Duval presents to the clinic with Follow-up, Hypertension, Hyperlipidemia, and Diabetes (6 mos)     Pt presents for routine follow up with lab and med refills. Overall doing well.      BP appears  controlled today. Home meds reviewed  The current medical regimen is effective;  continue present plan and medications. Recommended patient to check home readings to monitor and see me for followup as scheduled or sooner as needed.   Discussed sodium restriction, maintaining ideal body weight and regular exercise program as physiologic means to continue to achieve blood pressure control in addition to medication compliance.  Patient was educated that both decongestant and anti-inflammatory medication may raise blood pressure.  Advised to monitor BP at home. Advised on optimal BP readings - SBP < 130 & DBP < 80. Advised to call office for any persistent BP elevation and may have to prescribe or adjust BP med(s).  Recommended DASH diet, stay well hydrated with water daily, eliminate or decrease caffeinated and high calorie drinks, increase physical activity, and lose weight if BMI > 25.0. Written patient education information provided to patient with goals and recommendations to assist with BP management.     Discussed need for low fat/low cholesterol diet, regular exercise, and weight control.    Cardiovascular risk and specific lipid/LDL goals reviewed.      Diabetes treatment goals: (unless otherwise indicated due to risk factor stratification)  To achieve normal or near normal glucose levels.  Fasting glucose:   Before meals: 100-140  2 hours after meal:less 130  Bedtime goal glucose > 100            Review of Systems     Review of Systems   Constitutional:  Negative for fatigue and fever.   HENT:  Negative for nasal congestion and sore throat.    Eyes:  Negative for visual disturbance.   Respiratory:  Negative for chest tightness and shortness of breath.    Cardiovascular:  Negative for chest pain and leg swelling.   Gastrointestinal:  Negative for abdominal pain and change in bowel habit.   Endocrine: Negative for polydipsia, polyphagia and polyuria.   Genitourinary:  Negative for dysuria and hematuria.   Musculoskeletal:  Negative for back pain and leg pain.   Integumentary:  Negative for rash.   Neurological:  Negative for dizziness, syncope, weakness and light-headedness.        Medical / Social / Family History     Past Medical History:   Diagnosis Date    Anemia     Anemia, vitamin B12 deficiency     Chronic idiopathic constipation     chronic kidney disease     Depression     Diabetes mellitus, type 2     Dysphagia     GERD (gastroesophageal reflux disease)     Hyperlipidemia     Hypertension     Left ventricular hypertrophy     Nonrheumatic tricuspid (valve) insufficiency     Obese     Osteoarthritis of knee, unspecified     Ulcer of heel and midfoot        Past Surgical History:   Procedure Laterality Date     SECTION      FOOT SURGERY      JOINT REPLACEMENT Right     Hip    OTHER SURGICAL HISTORY      Foot and Toe surgery procedure screws in right foot        Social History  Ms. Duval  reports that she has never smoked. She has never used smokeless tobacco. She reports current drug use. Drug: Marijuana. She reports that she does not drink alcohol.    Family History  Ms. Duval's  family history includes Breast cancer in her sister; Diabetes in her mother; Glaucoma in an other family member; Heart disease in an other family member; Hyperlipidemia in an other family member; Hypertension in her father and mother.    Medications and Allergies     Medications  Outpatient Medications Marked as Taking for the 3/28/24 encounter (Office Visit) with Raiza David ACNP   Medication Sig Dispense Refill    alcohol swabs (BD ALCOHOL SWABS) PadM Use as directed 300 each 4    aspirin (ECOTRIN) 81 MG EC tablet Take 1 tablet (81 mg total) by mouth once daily. 90 tablet 3    blood sugar diagnostic Strp Use twice daily for glucose monitoring 150 strip 4    blood-glucose meter (ACCU-CHEK GUIDE GLUCOSE METER) Misc Use as directed 1 each 3    diclofenac sodium (VOLTAREN) 1 % Gel Apply 2 g topically 4 (four) times daily. 200 g 0    ergocalciferol (ERGOCALCIFEROL) 50,000 unit Cap Take 1 capsule (50,000 Units total) by mouth every 7 days. 90 capsule 3    HYDROcodone-acetaminophen (NORCO)  mg per tablet Take 1 tablet by mouth daily as needed.       lancets 32 gauge Misc Use twice daily for monitoring blood glucose 100 each 5    semaglutide (OZEMPIC) 2 mg/dose (8 mg/3 mL) PnIj Inject 2 mg into the skin every 7 days. 4 each 2       Allergies  Review of patient's allergies indicates:  No Known Allergies    Physical Examination     Vitals:    03/28/24 0834   BP: 122/86   Pulse: 84   Resp: 16   Temp: 97.9 °F (36.6 °C)     Physical Exam  Eyes:      Pupils: Pupils are equal, round, and reactive to light.   Cardiovascular:      Rate and Rhythm: Normal rate and regular rhythm.      Heart sounds: Normal heart sounds. No murmur heard.  Pulmonary:      Breath sounds: Normal breath sounds. No wheezing, rhonchi or rales.   Abdominal:      General: Bowel sounds are normal.      Palpations: Abdomen is soft.      Tenderness: There is no abdominal tenderness.      Hernia: No hernia is present.   Musculoskeletal:          General: No swelling.      Cervical back: Normal range of motion and neck supple.   Skin:     General: Skin is warm and dry.   Neurological:      Mental Status: She is alert and oriented to person, place, and time.          Lab Results   Component Value Date    WBC 7.73 05/04/2022    HGB 11.6 (L) 05/04/2022    HCT 38.4 05/04/2022    MCV 83.5 05/04/2022     05/04/2022        Sodium   Date Value Ref Range Status   09/28/2023 139 136 - 145 mmol/L Final     Potassium   Date Value Ref Range Status   09/28/2023 4.2 3.5 - 5.1 mmol/L Final     Chloride   Date Value Ref Range Status   09/28/2023 108 (H) 98 - 107 mmol/L Final     CO2   Date Value Ref Range Status   09/28/2023 26 21 - 32 mmol/L Final     Glucose   Date Value Ref Range Status   09/28/2023 80 74 - 106 mg/dL Final     BUN   Date Value Ref Range Status   09/28/2023 21 (H) 7 - 18 mg/dL Final     Creatinine   Date Value Ref Range Status   09/28/2023 0.86 0.55 - 1.02 mg/dL Final     Calcium   Date Value Ref Range Status   09/28/2023 9.5 8.5 - 10.1 mg/dL Final     Total Protein   Date Value Ref Range Status   09/28/2023 7.0 6.4 - 8.2 g/dL Final     Albumin   Date Value Ref Range Status   09/28/2023 3.2 (L) 3.5 - 5.0 g/dL Final     Bilirubin, Total   Date Value Ref Range Status   09/28/2023 0.1 >0.0 - 1.2 mg/dL Final     Alk Phos   Date Value Ref Range Status   09/28/2023 99 46 - 118 U/L Final     AST   Date Value Ref Range Status   09/28/2023 16 15 - 37 U/L Final     ALT   Date Value Ref Range Status   09/28/2023 23 13 - 56 U/L Final     Anion Gap   Date Value Ref Range Status   09/28/2023 9 7 - 16 mmol/L Final     eGFR   Date Value Ref Range Status   09/28/2023 79 >=60 mL/min/1.73m2 Final      Lab Results   Component Value Date    HGBA1C 6.1 09/28/2023      Lab Results   Component Value Date    CHOL 197 09/28/2023    CHOL 190 03/28/2023    CHOL 205 (H) 11/23/2022     Lab Results   Component Value Date    HDL 68 (H) 09/28/2023    HDL 62 (H) 03/28/2023    HDL 75  "(H) 11/23/2022     Lab Results   Component Value Date    LDLCALC 101 09/28/2023    LDLCALC 104 03/28/2023    LDLCALC 108 11/23/2022     No results found for: "DLDL"  Lab Results   Component Value Date    TRIG 140 09/28/2023    TRIG 119 03/28/2023    TRIG 109 11/23/2022     Lab Results   Component Value Date    CHOLHDL 2.9 09/28/2023    CHOLHDL 3.1 03/28/2023    CHOLHDL 2.7 11/23/2022      Lab Results   Component Value Date    TSH 1.690 02/01/2022        Assessment and Plan (including Health Maintenance)      Problem List  Smart Sets  Document Outside HM   :    Plan:     1. Essential hypertension  Assessment & Plan:  BP Readings from Last 3 Encounters:   03/28/24 122/86   02/28/24 104/70   09/28/23 125/84    The current medical regimen is effective;  continue present plan and medications.      Orders:  -     CBC Auto Differential; Future; Expected date: 03/28/2024  -     Comprehensive Metabolic Panel; Future; Expected date: 03/28/2024  -     hydroCHLOROthiazide (HYDRODIURIL) 25 MG tablet; Take 1 tablet (25 mg total) by mouth once daily.  Dispense: 90 tablet; Refill: 1    2. Hyperlipidemia associated with type 2 diabetes mellitus  Assessment & Plan:  Lab Results   Component Value Date    CHOL 197 09/28/2023    CHOL 190 03/28/2023    CHOL 205 (H) 11/23/2022     Lab Results   Component Value Date    HDL 68 (H) 09/28/2023    HDL 62 (H) 03/28/2023    HDL 75 (H) 11/23/2022     Lab Results   Component Value Date    LDLCALC 101 09/28/2023    LDLCALC 104 03/28/2023    LDLCALC 108 11/23/2022     Lab Results   Component Value Date    TRIG 140 09/28/2023    TRIG 119 03/28/2023    TRIG 109 11/23/2022       Lab Results   Component Value Date    CHOLHDL 2.9 09/28/2023    CHOLHDL 3.1 03/28/2023    CHOLHDL 2.7 11/23/2022    The current medical regimen is effective;  continue present plan and medications.      Orders:  -     Lipid Panel; Future; Expected date: 03/28/2024    3. Type 2 diabetes mellitus without complication, without " long-term current use of insulin  Assessment & Plan:  Lab Results   Component Value Date    HGBA1C 6.1 09/28/2023    HGBA1C 6.3 03/28/2023    HGBA1C 6.3 11/23/2022     Lab Results   Component Value Date    MICROALBUR <0.5 09/28/2023    LDLCALC 101 09/28/2023    CREATININE 0.86 09/28/2023    The current medical regimen is effective;  continue present plan and medications.      Orders:  -     Hemoglobin A1C; Future; Expected date: 03/28/2024  -     glipiZIDE (GLUCOTROL) 5 MG tablet; Take 1 tablet (5 mg total) by mouth 2 (two) times daily with meals.  Dispense: 180 tablet; Refill: 1  -     metFORMIN (GLUCOPHAGE) 1000 MG tablet; Take 1 tablet (1,000 mg total) by mouth 2 (two) times daily.  Dispense: 180 tablet; Refill: 1    4. Bipolar depression  Comments:  stable  cont home meds  Orders:  -     cariprazine (VRAYLAR) 1.5 mg Cap; Take 1 capsule (1.5 mg total) by mouth once daily.  Dispense: 90 capsule; Refill: 1    5. Screening for colon cancer  -     Colonoscopy; Future; Expected date: 03/28/2024    6. Morbid obesity    Other orders  -     esomeprazole (NEXIUM) 40 MG capsule; Take 1 capsule (40 mg total) by mouth once daily.  Dispense: 90 capsule; Refill: 1  -     fluconazole (DIFLUCAN) 100 MG tablet; Take 1 tablet (100 mg total) by mouth once daily. for 5 days  Dispense: 5 tablet; Refill: 0  -     nystatin (MYCOSTATIN) cream; Apply topically 2 (two) times daily.  Dispense: 30 g; Refill: 1         There are no Patient Instructions on file for this visit.     Health Maintenance Due   Topic Date Due    Hemoglobin A1c  03/28/2024    Colorectal Cancer Screening  06/09/2024         Health Maintenance Topics with due status: Not Due       Topic Last Completion Date    Cervical Cancer Screening 09/15/2021    Eye Exam 09/15/2023    Low Dose Statin 09/28/2023    Diabetes Urine Screening 09/28/2023    Foot Exam 09/28/2023    Lipid Panel 09/28/2023    Mammogram 01/08/2024       Future Appointments   Date Time Provider Department  [place]   8/8/2024 11:00 AM AWV NURSE, [facility] [place] Pa[place]   10/28/2024  8:00 AM Raiza David ACNP [facility] [place]   1/13/2025  8:00 AM [facility]2 [facility] [place]            Signature:  ALEX Rodriguez [facility] [address] [place] [address]  [phone]    Date of encounter: 3/28/24

## 2024-03-28 NOTE — ASSESSMENT & PLAN NOTE
Lab Results   Component Value Date    CHOL 197 09/28/2023    CHOL 190 03/28/2023    CHOL 205 (H) 11/23/2022     Lab Results   Component Value Date    HDL 68 (H) 09/28/2023    HDL 62 (H) 03/28/2023    HDL 75 (H) 11/23/2022     Lab Results   Component Value Date    LDLCALC 101 09/28/2023    LDLCALC 104 03/28/2023    LDLCALC 108 11/23/2022     Lab Results   Component Value Date    TRIG 140 09/28/2023    TRIG 119 03/28/2023    TRIG 109 11/23/2022       Lab Results   Component Value Date    CHOLHDL 2.9 09/28/2023    CHOLHDL 3.1 03/28/2023    CHOLHDL 2.7 11/23/2022    The current medical regimen is effective;  continue present plan and medications.

## 2024-03-28 NOTE — ASSESSMENT & PLAN NOTE
BP Readings from Last 3 Encounters:   03/28/24 122/86   02/28/24 104/70   09/28/23 125/84    The current medical regimen is effective;  continue present plan and medications.

## 2024-04-15 DIAGNOSIS — Z12.11 SCREENING FOR COLON CANCER: Primary | ICD-10-CM

## 2024-04-15 RX ORDER — POLYETHYLENE GLYCOL 3350, SODIUM SULFATE ANHYDROUS, SODIUM BICARBONATE, SODIUM CHLORIDE, POTASSIUM CHLORIDE 236; 22.74; 6.74; 5.86; 2.97 G/4L; G/4L; G/4L; G/4L; G/4L
4 POWDER, FOR SOLUTION ORAL ONCE
Qty: 4000 ML | Refills: 0 | Status: SHIPPED | OUTPATIENT
Start: 2024-04-15 | End: 2024-04-15

## 2024-05-01 RX ORDER — SEMAGLUTIDE 2.68 MG/ML
2 INJECTION, SOLUTION SUBCUTANEOUS
Qty: 4 EACH | Refills: 2 | Status: SHIPPED | OUTPATIENT
Start: 2024-05-01

## 2024-06-25 ENCOUNTER — CLINICAL SUPPORT (OUTPATIENT)
Dept: FAMILY MEDICINE | Facility: CLINIC | Age: 59
End: 2024-06-25
Payer: MEDICARE

## 2024-06-25 DIAGNOSIS — M25.521 RIGHT ELBOW PAIN: Primary | ICD-10-CM

## 2024-07-10 DIAGNOSIS — Z12.11 SCREEN FOR COLON CANCER: Primary | ICD-10-CM

## 2024-07-10 RX ORDER — POLYETHYLENE GLYCOL 3350, SODIUM SULFATE ANHYDROUS, SODIUM BICARBONATE, SODIUM CHLORIDE, POTASSIUM CHLORIDE 236; 22.74; 6.74; 5.86; 2.97 G/4L; G/4L; G/4L; G/4L; G/4L
4 POWDER, FOR SOLUTION ORAL ONCE
Qty: 4000 ML | Refills: 0 | Status: SHIPPED | OUTPATIENT
Start: 2024-07-10 | End: 2024-07-10

## 2024-07-31 RX ORDER — SEMAGLUTIDE 2.68 MG/ML
2 INJECTION, SOLUTION SUBCUTANEOUS
Qty: 3 ML | Refills: 2 | Status: SHIPPED | OUTPATIENT
Start: 2024-07-31

## 2024-07-31 NOTE — TELEPHONE ENCOUNTER
----- Message from Gloria Savage sent at 7/31/2024 12:46 PM CDT -----  Regarding: refill  Who Called: Hailey Duval    Refill or New Rx:Refill  RX Name and Strength:semaglutide (OZEMPIC) 2 mg/dose (8 mg/3 mL) PnIj  How is the patient currently taking it? (ex. 1XDay):  Is this a 30 day or 90 day RX:  Local or Mail Order:  List of preferred pharmacies on file (remove unneeded): [unfilled][facility]       If different Pharmacy is requested, enter Pharmacy information here including location and phone number:  [phone]   Ordering Provider:KOSTAS David      Preferred Method of Contact: Phone Call  Patient's Preferred Phone Number on File: [phone]   Best Call Back Number, if different:  Additional Information: \

## 2024-08-08 ENCOUNTER — OFFICE VISIT (OUTPATIENT)
Dept: FAMILY MEDICINE | Facility: CLINIC | Age: 59
End: 2024-08-08
Payer: MEDICARE

## 2024-08-08 VITALS
OXYGEN SATURATION: 95 % | DIASTOLIC BLOOD PRESSURE: 78 MMHG | RESPIRATION RATE: 19 BRPM | SYSTOLIC BLOOD PRESSURE: 122 MMHG | WEIGHT: 191 LBS | BODY MASS INDEX: 37.5 KG/M2 | HEIGHT: 60 IN | HEART RATE: 61 BPM

## 2024-08-08 DIAGNOSIS — R26.9 ABNORMALITY OF GAIT AND MOBILITY: ICD-10-CM

## 2024-08-08 DIAGNOSIS — E11.9 TYPE 2 DIABETES MELLITUS WITHOUT COMPLICATION, WITHOUT LONG-TERM CURRENT USE OF INSULIN: ICD-10-CM

## 2024-08-08 DIAGNOSIS — F31.9 BIPOLAR DEPRESSION: ICD-10-CM

## 2024-08-08 DIAGNOSIS — K21.9 GASTROESOPHAGEAL REFLUX DISEASE, UNSPECIFIED WHETHER ESOPHAGITIS PRESENT: ICD-10-CM

## 2024-08-08 DIAGNOSIS — Z00.00 ENCOUNTER FOR SUBSEQUENT ANNUAL WELLNESS VISIT (AWV) IN MEDICARE PATIENT: Primary | ICD-10-CM

## 2024-08-08 DIAGNOSIS — I10 ESSENTIAL HYPERTENSION: ICD-10-CM

## 2024-08-08 DIAGNOSIS — E78.5 HYPERLIPIDEMIA ASSOCIATED WITH TYPE 2 DIABETES MELLITUS: ICD-10-CM

## 2024-08-08 DIAGNOSIS — E11.69 HYPERLIPIDEMIA ASSOCIATED WITH TYPE 2 DIABETES MELLITUS: ICD-10-CM

## 2024-08-08 DIAGNOSIS — E66.01 MORBID OBESITY: ICD-10-CM

## 2024-08-08 PROCEDURE — 3044F HG A1C LEVEL LT 7.0%: CPT | Mod: ,,, | Performed by: NURSE PRACTITIONER

## 2024-08-08 PROCEDURE — 3074F SYST BP LT 130 MM HG: CPT | Mod: ,,, | Performed by: NURSE PRACTITIONER

## 2024-08-08 PROCEDURE — 1160F RVW MEDS BY RX/DR IN RCRD: CPT | Mod: ,,, | Performed by: NURSE PRACTITIONER

## 2024-08-08 PROCEDURE — 3078F DIAST BP <80 MM HG: CPT | Mod: ,,, | Performed by: NURSE PRACTITIONER

## 2024-08-08 PROCEDURE — 1159F MED LIST DOCD IN RCRD: CPT | Mod: ,,, | Performed by: NURSE PRACTITIONER

## 2024-08-08 PROCEDURE — G0439 PPPS, SUBSEQ VISIT: HCPCS | Mod: ,,, | Performed by: NURSE PRACTITIONER

## 2024-08-08 RX ORDER — LOVASTATIN 40 MG/1
40 TABLET ORAL NIGHTLY
Qty: 90 TABLET | Refills: 0 | Status: SHIPPED | OUTPATIENT
Start: 2024-08-08

## 2024-08-09 DIAGNOSIS — Z71.89 COMPLEX CARE COORDINATION: ICD-10-CM

## 2024-08-12 DIAGNOSIS — Z12.11 COLON CANCER SCREENING: Primary | ICD-10-CM

## 2024-08-12 RX ORDER — POLYETHYLENE GLYCOL 3350, SODIUM SULFATE ANHYDROUS, SODIUM BICARBONATE, SODIUM CHLORIDE, POTASSIUM CHLORIDE 236; 22.74; 6.74; 5.86; 2.97 G/4L; G/4L; G/4L; G/4L; G/4L
4 POWDER, FOR SOLUTION ORAL ONCE
Qty: 4000 ML | Refills: 0 | Status: SHIPPED | OUTPATIENT
Start: 2024-08-12 | End: 2024-08-12

## 2024-08-27 ENCOUNTER — HOSPITAL ENCOUNTER (OUTPATIENT)
Dept: GASTROENTEROLOGY | Facility: HOSPITAL | Age: 59
Discharge: HOME | End: 2024-08-27
Attending: NURSE PRACTITIONER | Admitting: INTERNAL MEDICINE
Payer: MEDICARE

## 2024-08-27 ENCOUNTER — ANESTHESIA (OUTPATIENT)
Dept: GASTROENTEROLOGY | Facility: HOSPITAL | Age: 59
End: 2024-08-27
Payer: MEDICARE

## 2024-08-27 ENCOUNTER — ANESTHESIA EVENT (OUTPATIENT)
Dept: GASTROENTEROLOGY | Facility: HOSPITAL | Age: 59
End: 2024-08-27
Payer: MEDICARE

## 2024-08-27 VITALS
HEART RATE: 61 BPM | WEIGHT: 191 LBS | SYSTOLIC BLOOD PRESSURE: 124 MMHG | RESPIRATION RATE: 13 BRPM | BODY MASS INDEX: 37.5 KG/M2 | TEMPERATURE: 98 F | DIASTOLIC BLOOD PRESSURE: 70 MMHG | OXYGEN SATURATION: 98 % | HEIGHT: 60 IN

## 2024-08-27 DIAGNOSIS — Z12.11 SCREENING FOR COLON CANCER: ICD-10-CM

## 2024-08-27 LAB — GLUCOSE SERPL-MCNC: 120 MG/DL (ref 70–105)

## 2024-08-27 PROCEDURE — 88305 TISSUE EXAM BY PATHOLOGIST: CPT | Mod: 26,,, | Performed by: PATHOLOGY

## 2024-08-27 PROCEDURE — 88305 TISSUE EXAM BY PATHOLOGIST: CPT | Mod: TC,SUR | Performed by: INTERNAL MEDICINE

## 2024-08-27 PROCEDURE — 45385 COLONOSCOPY W/LESION REMOVAL: CPT | Mod: PT | Performed by: INTERNAL MEDICINE

## 2024-08-27 PROCEDURE — 25000003 PHARM REV CODE 250: Performed by: NURSE ANESTHETIST, CERTIFIED REGISTERED

## 2024-08-27 PROCEDURE — 37000009 HC ANESTHESIA EA ADD 15 MINS

## 2024-08-27 PROCEDURE — 63600175 PHARM REV CODE 636 W HCPCS: Performed by: NURSE ANESTHETIST, CERTIFIED REGISTERED

## 2024-08-27 PROCEDURE — 45385 COLONOSCOPY W/LESION REMOVAL: CPT | Mod: PT,,, | Performed by: INTERNAL MEDICINE

## 2024-08-27 PROCEDURE — 37000008 HC ANESTHESIA 1ST 15 MINUTES

## 2024-08-27 PROCEDURE — 82962 GLUCOSE BLOOD TEST: CPT

## 2024-08-27 PROCEDURE — 27201423 OPTIME MED/SURG SUP & DEVICES STERILE SUPPLY

## 2024-08-27 RX ORDER — SODIUM CHLORIDE 0.9 % (FLUSH) 0.9 %
10 SYRINGE (ML) INJECTION
Status: DISCONTINUED | OUTPATIENT
Start: 2024-08-27 | End: 2024-08-28 | Stop reason: HOSPADM

## 2024-08-27 RX ORDER — PROPOFOL 10 MG/ML
VIAL (ML) INTRAVENOUS
Status: DISCONTINUED | OUTPATIENT
Start: 2024-08-27 | End: 2024-08-27

## 2024-08-27 RX ORDER — LIDOCAINE HYDROCHLORIDE 20 MG/ML
INJECTION, SOLUTION EPIDURAL; INFILTRATION; INTRACAUDAL; PERINEURAL
Status: DISCONTINUED | OUTPATIENT
Start: 2024-08-27 | End: 2024-08-27

## 2024-08-27 RX ADMIN — PROPOFOL 100 MG: 10 INJECTION, EMULSION INTRAVENOUS at 09:08

## 2024-08-27 RX ADMIN — PROPOFOL 100 MG: 10 INJECTION, EMULSION INTRAVENOUS at 10:08

## 2024-08-27 RX ADMIN — SODIUM CHLORIDE: 9 INJECTION, SOLUTION INTRAVENOUS at 09:08

## 2024-08-27 RX ADMIN — LIDOCAINE HYDROCHLORIDE 80 MG: 20 INJECTION, SOLUTION INTRAVENOUS at 09:08

## 2024-08-27 NOTE — ANESTHESIA POSTPROCEDURE EVALUATION
Anesthesia Post Evaluation    Patient: Hailey Duval    Procedure(s) Performed: * No procedures listed *    Final Anesthesia Type: MAC      Patient location during evaluation: GI PACU ([facility])  Patient participation: Yes- Able to Participate  Level of consciousness: awake and alert  Post-procedure vital signs: reviewed and stable  Pain management: adequate  Airway patency: patent    PONV status at discharge: No PONV  Anesthetic complications: no      Cardiovascular status: blood pressure returned to baseline, hemodynamically stable and stable  Respiratory status: unassisted  Hydration status: euvolemic  Follow-up not needed.  Comments: Refer to nursing note for pain/faith score upon discharge from recovery.               Vitals Value Taken Time   /70 08/27/24 1036   Temp 36.6 °C (97.8 °F) 08/27/24 1010   Pulse 62 08/27/24 1036   Resp 16 08/27/24 1036   SpO2 97 % 08/27/24 1036   Vitals shown include unfiled device data.      Event Time   Out of Recovery 10:41:36         Pain/Faith Score: Faith Score: 10 (8/27/2024 10:20 AM)

## 2024-08-27 NOTE — TRANSFER OF CARE
Anesthesia Transfer of Care Note    Patient: Hailey Duval    Procedure(s) Performed: * No procedures listed *    Patient location: GI    Anesthesia Type: MAC    Transport from OR: Transported from OR on room air with adequate spontaneous ventilation    Post pain: adequate analgesia    Post assessment: no apparent anesthetic complications    Post vital signs: stable    Level of consciousness: sedated and responds to stimulation    Nausea/Vomiting: no nausea/vomiting    Complications: none    Transfer of care protocol was followedComments: Good SV continue, NAD noted, VSS, RTRN      Last vitals: Visit Vitals  BP 95/68 (BP Location: Right arm)   Pulse 77   Temp 36.6 °C (97.8 °F) (Oral)   Resp 18   Ht 5' (1.524 m)   Wt 86.6 kg (191 lb)   SpO2 98%   Breastfeeding No   BMI 37.30 kg/m²

## 2024-08-27 NOTE — DISCHARGE INSTRUCTIONS
Procedure Date  8/27/24     Impression  Overall Impression:   Subcentimeter polyp in the transverse colon was removed with cold snare  Scattered diverticulosis of mild severity in the sigmoid colon  The ileocecal valve, cecum, ascending colon and descending colon appeared normal.  Small hemorrhoids        Recommendation  Await pathology results   Repeat colonoscopy in 7 years         Outcome of procedure: successful Colonoscopy  Disposition: patient to recovery following procedure; discharge to home when appropriate parameters met  Provisions for follow up: please call my office for any unexpected symptoms like chest or abdominal pain or bleeding following your procedure.  Final Diagnosis: colon polyp     No driving today, no operating heavy machinery, no signing any legal documents until tomorrow.    Drink lots of fluids, resume regular diet.  Take your normal medications.     Please call our office for any nausea, vomiting or abdominal pain.

## 2024-08-27 NOTE — H&P
Gastroenterology Pre-procedure H&P    History of Present Illness    Hailey Duval is a 58 y.o. female that  has a past medical history of Anemia, Anemia, vitamin B12 deficiency, Chronic idiopathic constipation, chronic kidney disease, Depression, Diabetes mellitus, type 2, Dysphagia, GERD (gastroesophageal reflux disease), Hyperlipidemia, Hypertension, Left ventricular hypertrophy, Nonrheumatic tricuspid (valve) insufficiency, Obese, Osteoarthritis of knee, unspecified, and Ulcer of heel and midfoot.     Patient here for routine surveillance    Patient denies wt loss, abdominal pain, diarrhea, melena/hematochezia, change in stool caliber, no anticoagulants, FMHx of GI related malignancies.      Past Medical History:   Diagnosis Date    Anemia     Anemia, vitamin B12 deficiency     Chronic idiopathic constipation     chronic kidney disease     Depression     Diabetes mellitus, type 2     Dysphagia     GERD (gastroesophageal reflux disease)     Hyperlipidemia     Hypertension     Left ventricular hypertrophy     Nonrheumatic tricuspid (valve) insufficiency     Obese     Osteoarthritis of knee, unspecified     Ulcer of heel and midfoot        Past Surgical History:   Procedure Laterality Date     SECTION      FOOT SURGERY      JOINT REPLACEMENT Right     Hip    OTHER SURGICAL HISTORY      Foot and Toe surgery procedure screws in right foot        Family History   Problem Relation Name Age of Onset    Diabetes Mother      Hypertension Mother      Hypertension Father      Glaucoma Other      Heart disease Other      Hyperlipidemia Other      Breast cancer Sister         Social History     Socioeconomic History    Marital status: Single   Tobacco Use    Smoking status: Never     Passive exposure: Never    Smokeless tobacco: Never   Substance and Sexual Activity    Alcohol use: Never    Drug use: Yes     Types: Marijuana     Comment:  per pt stoppled crack x20yrs      Sexual activity: Yes     Social Determinants of  Health     Financial Resource Strain: Low Risk  (8/8/2024)    Overall Financial Resource Strain (CARDIA)     Difficulty of Paying Living Expenses: Not hard at all   Food Insecurity: No Food Insecurity (8/8/2024)    Hunger Vital Sign     Worried About Running Out of Food in the Last Year: Never true     Ran Out of Food in the Last Year: Never true   Transportation Needs: No Transportation Needs (8/8/2024)    PRAPARE - Transportation     Lack of Transportation (Medical): No     Lack of Transportation (Non-Medical): No   Physical Activity: Inactive (8/8/2024)    Exercise Vital Sign     Days of Exercise per Week: 0 days     Minutes of Exercise per Session: 0 min   Stress: No Stress Concern Present (8/8/2024)    [employer] - Occupational Stress Questionnaire     Feeling of Stress : Not at all   Housing Stability: Low Risk  (8/8/2024)    Housing Stability Vital Sign     Unable to Pay for Housing in the Last Year: No     Homeless in the Last Year: No       Current Outpatient Medications   Medication Sig Dispense Refill    alcohol swabs (BD ALCOHOL SWABS) PadM Use as directed 300 each 4    aspirin (ECOTRIN) 81 MG EC tablet Take 1 tablet (81 mg total) by mouth once daily. 90 tablet 3    blood sugar diagnostic Strp Use twice daily for glucose monitoring 150 strip 4    blood-glucose meter (ACCU-CHEK GUIDE GLUCOSE METER) Misc Use as directed 1 each 3    cariprazine (VRAYLAR) 1.5 mg Cap Take 1 capsule (1.5 mg total) by mouth once daily. 90 capsule 1    diclofenac sodium (VOLTAREN) 1 % Gel Apply 2 g topically 4 (four) times daily. 200 g 0    ergocalciferol (ERGOCALCIFEROL) 50,000 unit Cap Take 1 capsule (50,000 Units total) by mouth every 7 days. 90 capsule 3    esomeprazole (NEXIUM) 40 MG capsule Take 1 capsule (40 mg total) by mouth once daily. 90 capsule 1    glipiZIDE (GLUCOTROL) 5 MG tablet Take 1 tablet (5 mg total) by mouth 2 (two) times daily with meals. 180 tablet 1    hydroCHLOROthiazide  (HYDRODIURIL) 25 MG tablet Take 1 tablet (25 mg total) by mouth once daily. 90 tablet 1    HYDROcodone-acetaminophen (NORCO)  mg per tablet Take 1 tablet by mouth daily as needed.       ibuprofen (ADVIL,MOTRIN) 600 MG tablet Take 1 tablet (600 mg total) by mouth every 6 (six) hours as needed for Pain. 30 tablet 0    lancets 32 gauge Misc Use twice daily for monitoring blood glucose 100 each 5    lovastatin (MEVACOR) 40 MG tablet Take 1 tablet (40 mg total) by mouth every evening. 90 tablet 0    metFORMIN (GLUCOPHAGE) 1000 MG tablet Take 1 tablet (1,000 mg total) by mouth 2 (two) times daily. 180 tablet 1    nystatin (MYCOSTATIN) cream Apply topically 2 (two) times daily. 30 g 1    OZEMPIC 2 mg/dose (8 mg/3 mL) PnIj INJECT 2 MG INTO THE SKIN EVERY 7 DAYS. 3 mL 2    traZODone (DESYREL) 100 MG tablet Take 1 tablet (100 mg total) by mouth nightly as needed for Insomnia. 90 tablet 1     No current facility-administered medications for this encounter.       Review of patient's allergies indicates:  No Known Allergies    Objective:  Vitals:    08/27/24 0841   BP: (!) 97/53   Pulse: (!) 59   Resp: 15   Temp: 98 °F (36.7 °C)   TempSrc: Oral   SpO2: 99%   Weight: 86.6 kg (191 lb)   Height: 5' (1.524 m)        GEN: normal appearing, NAD, AAO x3  HENT: NCAT, anicteric, OP benign  CV: bradycardic, regular rhythm  RESP: NABS, symmetric rise, unlabored  ABD: soft, ND, no guarding or TTP  SKIN: warm and dry  NEURO: grossly afocal    Assessment and Plan:    Proceed with:    Colonoscopy for previous adenomatous polyp, screening for colon cancer    Alverto Montez MD  Gastroenterology

## 2024-08-27 NOTE — ANESTHESIA PREPROCEDURE EVALUATION
08/27/2024  Hailey Duval is a 58 y.o., female.      Pre-op Assessment    I have reviewed the Patient Summary Reports.     I have reviewed the Nursing Notes. I have reviewed the NPO Status.   I have reviewed the Medications.     Review of Systems  Anesthesia Hx:  No problems with previous Anesthesia   History of prior surgery of interest to airway management or planning:          Denies Family Hx of Anesthesia complications.    Denies Personal Hx of Anesthesia complications.                    Cardiovascular:     Hypertension                                  Hypertension         Renal/:  Chronic Renal Disease        Kidney Function/Disease             Hepatic/GI:     GERD      Gerd          Musculoskeletal:  Arthritis        Arthritis          Neurological:      Arthritis                           Endocrine:  Diabetes    Diabetes                      Psych:  Psychiatric History                Active Ambulatory Problems     Diagnosis Date Noted    Essential hypertension 05/17/2021    Hyperlipidemia associated with type 2 diabetes mellitus 05/17/2021    Gastroesophageal reflux disease 05/17/2021    B12 deficiency 05/17/2021    Vitamin D deficiency 05/17/2021    Irritable bowel syndrome 08/17/2021    HPV (human papilloma virus) infection 12/13/2021    Fatigue 02/01/2022    Bipolar depression 02/07/2022    BMI 38.0-38.9,adult 05/04/2022    Type 2 diabetes mellitus without complication, without long-term current use of insulin 05/09/2022    Tricompartment osteoarthritis of left knee 05/21/2022    LVH (left ventricular hypertrophy) 05/04/2023    Morbid obesity 03/28/2024     Resolved Ambulatory Problems     Diagnosis Date Noted    Type 2 diabetes mellitus with hyperglycemia, without long-term current use of insulin 05/17/2021    Encounter for Papanicolaou smear of cervix 09/15/2021    Screening for viral disease  09/15/2021    BMI 45.0-49.9, adult 11/16/2021    Acute cystitis with hematuria 12/02/2021    Abdominal pain 12/02/2021    Need for hepatitis C screening test 02/01/2022    Encounter for screening for HIV 02/01/2022    BMI 37.0-37.9, adult 05/09/2022    Encounter for preventive health examination 05/09/2022    Overweight 05/09/2022    COVID 05/19/2022    Knee pain 05/21/2022    Impaired fasting glucose 08/10/2022     Past Medical History:   Diagnosis Date    Anemia     Anemia, vitamin B12 deficiency     Chronic idiopathic constipation     chronic kidney disease     Depression     Diabetes mellitus, type 2     Dysphagia     GERD (gastroesophageal reflux disease)     Hyperlipidemia     Hypertension     Left ventricular hypertrophy     Nonrheumatic tricuspid (valve) insufficiency     Obese     Osteoarthritis of knee, unspecified     Ulcer of heel and midfoot     Review of patient's allergies indicates:  No Known Allergies   Current Outpatient Medications on File Prior to Visit   Medication Sig Dispense Refill    alcohol swabs (BD ALCOHOL SWABS) PadM Use as directed 300 each 4    aspirin (ECOTRIN) 81 MG EC tablet Take 1 tablet (81 mg total) by mouth once daily. 90 tablet 3    blood sugar diagnostic Strp Use twice daily for glucose monitoring 150 strip 4    blood-glucose meter (ACCU-CHEK GUIDE GLUCOSE METER) Misc Use as directed 1 each 3    cariprazine (VRAYLAR) 1.5 mg Cap Take 1 capsule (1.5 mg total) by mouth once daily. 90 capsule 1    diclofenac sodium (VOLTAREN) 1 % Gel Apply 2 g topically 4 (four) times daily. 200 g 0    ergocalciferol (ERGOCALCIFEROL) 50,000 unit Cap Take 1 capsule (50,000 Units total) by mouth every 7 days. 90 capsule 3    esomeprazole (NEXIUM) 40 MG capsule Take 1 capsule (40 mg total) by mouth once daily. 90 capsule 1    glipiZIDE (GLUCOTROL) 5 MG tablet Take 1 tablet (5 mg total) by mouth 2 (two) times daily with meals. 180 tablet 1    hydroCHLOROthiazide (HYDRODIURIL) 25 MG tablet Take 1  tablet (25 mg total) by mouth once daily. 90 tablet 1    HYDROcodone-acetaminophen (NORCO)  mg per tablet Take 1 tablet by mouth daily as needed.       ibuprofen (ADVIL,MOTRIN) 600 MG tablet Take 1 tablet (600 mg total) by mouth every 6 (six) hours as needed for Pain. 30 tablet 0    lancets 32 gauge Misc Use twice daily for monitoring blood glucose 100 each 5    lovastatin (MEVACOR) 40 MG tablet Take 1 tablet (40 mg total) by mouth every evening. 90 tablet 0    metFORMIN (GLUCOPHAGE) 1000 MG tablet Take 1 tablet (1,000 mg total) by mouth 2 (two) times daily. 180 tablet 1    nystatin (MYCOSTATIN) cream Apply topically 2 (two) times daily. 30 g 1    OZEMPIC 2 mg/dose (8 mg/3 mL) PnIj INJECT 2 MG INTO THE SKIN EVERY 7 DAYS. 3 mL 2    traZODone (DESYREL) 100 MG tablet Take 1 tablet (100 mg total) by mouth nightly as needed for Insomnia. 90 tablet 1     No current facility-administered medications on file prior to visit.      Past Surgical History:   Procedure Laterality Date     SECTION      FOOT SURGERY      JOINT REPLACEMENT Right     Hip    OTHER SURGICAL HISTORY      Foot and Toe surgery procedure screws in right foot         Physical Exam  General: Well nourished    Airway:  Mallampati: II   Mouth Opening: Normal  TM Distance: Normal, at least 6 cm  Tongue: Normal  Neck ROM: Normal ROM        Anesthesia Plan  Type of Anesthesia, risks & benefits discussed:    Anesthesia Type: MAC  Intra-op Monitoring Plan: Standard ASA Monitors  Post Op Pain Control Plan: multimodal analgesia  Induction:  IV  Informed Consent: Informed consent signed with the Patient and all parties understand the risks and agree with anesthesia plan.  All questions answered. Patient consented to blood products? No  ASA Score: 3  Day of Surgery Review of History & Physical: H&P Update referred to the surgeon/provider.I have interviewed and examined the patient. I have reviewed the patient's H&P dated: There are no significant changes.      Ready For Surgery From Anesthesia Perspective.     .

## 2024-08-28 LAB
ESTROGEN SERPL-MCNC: NORMAL PG/ML
INSULIN SERPL-ACNC: NORMAL U[IU]/ML
LAB AP GROSS DESCRIPTION: NORMAL
LAB AP LABORATORY NOTES: NORMAL
T3RU NFR SERPL: NORMAL %

## 2024-09-10 ENCOUNTER — PATIENT OUTREACH (OUTPATIENT)
Facility: HOSPITAL | Age: 59
End: 2024-09-10
Payer: MEDICARE

## 2024-09-10 NOTE — PROGRESS NOTES
Population Health Chart Review & Patient Outreach Details    Updates Requested / Reviewed:  [x]  Care Team Updated  []  Care Everywhere Updated & Reviewed  []  [facility] Reviewed  []   Reviewed  []  [facility]IIX Reviewed    Health Maintenance Topics Addressed and Outreach Outcomes / Actions Taken:    LOW DOSE STATIN    [x]  Pt taking statin lovastatin 40 mg nightly    []     []     []

## 2024-10-28 ENCOUNTER — OFFICE VISIT (OUTPATIENT)
Dept: FAMILY MEDICINE | Facility: CLINIC | Age: 59
End: 2024-10-28
Payer: MEDICARE

## 2024-10-28 VITALS
DIASTOLIC BLOOD PRESSURE: 83 MMHG | WEIGHT: 175.88 LBS | SYSTOLIC BLOOD PRESSURE: 116 MMHG | TEMPERATURE: 98 F | RESPIRATION RATE: 16 BRPM | HEIGHT: 60 IN | HEART RATE: 72 BPM | OXYGEN SATURATION: 99 % | BODY MASS INDEX: 34.53 KG/M2

## 2024-10-28 DIAGNOSIS — I10 ESSENTIAL HYPERTENSION: ICD-10-CM

## 2024-10-28 DIAGNOSIS — E11.69 HYPERLIPIDEMIA ASSOCIATED WITH TYPE 2 DIABETES MELLITUS: ICD-10-CM

## 2024-10-28 DIAGNOSIS — Z23 INFLUENZA VACCINATION GIVEN: ICD-10-CM

## 2024-10-28 DIAGNOSIS — E78.5 HYPERLIPIDEMIA ASSOCIATED WITH TYPE 2 DIABETES MELLITUS: ICD-10-CM

## 2024-10-28 DIAGNOSIS — I10 ESSENTIAL HYPERTENSION: Primary | ICD-10-CM

## 2024-10-28 DIAGNOSIS — E55.9 VITAMIN D DEFICIENCY: ICD-10-CM

## 2024-10-28 DIAGNOSIS — E11.9 TYPE 2 DIABETES MELLITUS WITHOUT COMPLICATION, WITHOUT LONG-TERM CURRENT USE OF INSULIN: ICD-10-CM

## 2024-10-28 DIAGNOSIS — F31.9 BIPOLAR DEPRESSION: ICD-10-CM

## 2024-10-28 LAB
ALBUMIN SERPL BCP-MCNC: 3.3 G/DL (ref 3.5–5)
ALBUMIN/GLOB SERPL: 0.8 {RATIO}
ALP SERPL-CCNC: 91 U/L (ref 46–118)
ALT SERPL W P-5'-P-CCNC: 15 U/L (ref 13–56)
ANION GAP SERPL CALCULATED.3IONS-SCNC: 8 MMOL/L (ref 7–16)
AST SERPL W P-5'-P-CCNC: 12 U/L (ref 15–37)
BASOPHILS # BLD AUTO: 0.02 K/UL (ref 0–0.2)
BASOPHILS NFR BLD AUTO: 0.3 % (ref 0–1)
BILIRUB SERPL-MCNC: 0.2 MG/DL (ref ?–1.2)
BUN SERPL-MCNC: 20 MG/DL (ref 7–18)
BUN/CREAT SERPL: 23 (ref 6–20)
CALCIUM SERPL-MCNC: 9.4 MG/DL (ref 8.5–10.1)
CHLORIDE SERPL-SCNC: 107 MMOL/L (ref 98–107)
CHOLEST SERPL-MCNC: 186 MG/DL (ref 0–200)
CHOLEST/HDLC SERPL: 2.6 {RATIO}
CO2 SERPL-SCNC: 27 MMOL/L (ref 21–32)
CREAT SERPL-MCNC: 0.88 MG/DL (ref 0.55–1.02)
CREAT UR-MCNC: 92 MG/DL (ref 28–219)
DIFFERENTIAL METHOD BLD: ABNORMAL
EGFR (NO RACE VARIABLE) (RUSH/TITUS): 76 ML/MIN/1.73M2
EOSINOPHIL # BLD AUTO: 0.11 K/UL (ref 0–0.5)
EOSINOPHIL NFR BLD AUTO: 1.6 % (ref 1–4)
ERYTHROCYTE [DISTWIDTH] IN BLOOD BY AUTOMATED COUNT: 15.1 % (ref 11.5–14.5)
EST. AVERAGE GLUCOSE BLD GHB EST-MCNC: 123 MG/DL
GLOBULIN SER-MCNC: 3.9 G/DL (ref 2–4)
GLUCOSE SERPL-MCNC: 66 MG/DL (ref 74–106)
HBA1C MFR BLD HPLC: 5.9 % (ref 4.5–6.6)
HCT VFR BLD AUTO: 40.3 % (ref 38–47)
HDLC SERPL-MCNC: 72 MG/DL (ref 40–60)
HGB BLD-MCNC: 12.1 G/DL (ref 12–16)
IMM GRANULOCYTES # BLD AUTO: 0.03 K/UL (ref 0–0.04)
IMM GRANULOCYTES NFR BLD: 0.4 % (ref 0–0.4)
LDLC SERPL CALC-MCNC: 98 MG/DL
LDLC/HDLC SERPL: 1.4 {RATIO}
LYMPHOCYTES # BLD AUTO: 2.93 K/UL (ref 1–4.8)
LYMPHOCYTES NFR BLD AUTO: 42.5 % (ref 27–41)
MCH RBC QN AUTO: 26.2 PG (ref 27–31)
MCHC RBC AUTO-ENTMCNC: 30 G/DL (ref 32–36)
MCV RBC AUTO: 87.2 FL (ref 80–96)
MICROALBUMIN UR-MCNC: 0.6 MG/DL (ref 0–2.8)
MICROALBUMIN/CREAT RATIO PNL UR: 6.5 MG/G (ref 0–30)
MONOCYTES # BLD AUTO: 0.52 K/UL (ref 0–0.8)
MONOCYTES NFR BLD AUTO: 7.5 % (ref 2–6)
MPC BLD CALC-MCNC: 11.3 FL (ref 9.4–12.4)
NEUTROPHILS # BLD AUTO: 3.28 K/UL (ref 1.8–7.7)
NEUTROPHILS NFR BLD AUTO: 47.7 % (ref 53–65)
NONHDLC SERPL-MCNC: 114 MG/DL
NRBC # BLD AUTO: 0 X10E3/UL
NRBC, AUTO (.00): 0 %
PLATELET # BLD AUTO: 281 K/UL (ref 150–400)
POTASSIUM SERPL-SCNC: 3.8 MMOL/L (ref 3.5–5.1)
PROT SERPL-MCNC: 7.2 G/DL (ref 6.4–8.2)
RBC # BLD AUTO: 4.62 M/UL (ref 4.2–5.4)
SODIUM SERPL-SCNC: 138 MMOL/L (ref 136–145)
TRIGL SERPL-MCNC: 78 MG/DL (ref 35–150)
VLDLC SERPL-MCNC: 16 MG/DL
WBC # BLD AUTO: 6.89 K/UL (ref 4.5–11)

## 2024-10-28 PROCEDURE — 3066F NEPHROPATHY DOC TX: CPT | Mod: ,,, | Performed by: NURSE PRACTITIONER

## 2024-10-28 PROCEDURE — 90656 IIV3 VACC NO PRSV 0.5 ML IM: CPT | Mod: ,,, | Performed by: NURSE PRACTITIONER

## 2024-10-28 PROCEDURE — 1159F MED LIST DOCD IN RCRD: CPT | Mod: ,,, | Performed by: NURSE PRACTITIONER

## 2024-10-28 PROCEDURE — 85025 COMPLETE CBC W/AUTO DIFF WBC: CPT | Mod: ,,, | Performed by: CLINICAL MEDICAL LABORATORY

## 2024-10-28 PROCEDURE — 3074F SYST BP LT 130 MM HG: CPT | Mod: ,,, | Performed by: NURSE PRACTITIONER

## 2024-10-28 PROCEDURE — 3044F HG A1C LEVEL LT 7.0%: CPT | Mod: ,,, | Performed by: NURSE PRACTITIONER

## 2024-10-28 PROCEDURE — 99214 OFFICE O/P EST MOD 30 MIN: CPT | Mod: 25,,, | Performed by: NURSE PRACTITIONER

## 2024-10-28 PROCEDURE — 3079F DIAST BP 80-89 MM HG: CPT | Mod: ,,, | Performed by: NURSE PRACTITIONER

## 2024-10-28 PROCEDURE — 80061 LIPID PANEL: CPT | Mod: ,,, | Performed by: CLINICAL MEDICAL LABORATORY

## 2024-10-28 PROCEDURE — 82570 ASSAY OF URINE CREATININE: CPT | Mod: ,,, | Performed by: CLINICAL MEDICAL LABORATORY

## 2024-10-28 PROCEDURE — 1160F RVW MEDS BY RX/DR IN RCRD: CPT | Mod: ,,, | Performed by: NURSE PRACTITIONER

## 2024-10-28 PROCEDURE — G0008 ADMIN INFLUENZA VIRUS VAC: HCPCS | Mod: ,,, | Performed by: NURSE PRACTITIONER

## 2024-10-28 PROCEDURE — 83036 HEMOGLOBIN GLYCOSYLATED A1C: CPT | Mod: ,,, | Performed by: CLINICAL MEDICAL LABORATORY

## 2024-10-28 PROCEDURE — 80053 COMPREHEN METABOLIC PANEL: CPT | Mod: ,,, | Performed by: CLINICAL MEDICAL LABORATORY

## 2024-10-28 PROCEDURE — 82043 UR ALBUMIN QUANTITATIVE: CPT | Mod: ,,, | Performed by: CLINICAL MEDICAL LABORATORY

## 2024-10-28 PROCEDURE — 3061F NEG MICROALBUMINURIA REV: CPT | Mod: ,,, | Performed by: NURSE PRACTITIONER

## 2024-10-28 PROCEDURE — 3008F BODY MASS INDEX DOCD: CPT | Mod: ,,, | Performed by: NURSE PRACTITIONER

## 2024-10-28 RX ORDER — ASPIRIN 81 MG/1
81 TABLET ORAL DAILY
Qty: 90 TABLET | Refills: 3 | Status: SHIPPED | OUTPATIENT
Start: 2024-10-28

## 2024-10-28 RX ORDER — ERGOCALCIFEROL 1.25 MG/1
50000 CAPSULE ORAL
Qty: 15 CAPSULE | Refills: 3 | Status: SHIPPED | OUTPATIENT
Start: 2024-10-28

## 2024-10-28 RX ORDER — ESOMEPRAZOLE MAGNESIUM 40 MG/1
40 CAPSULE, DELAYED RELEASE ORAL DAILY
Qty: 90 CAPSULE | Refills: 1 | Status: SHIPPED | OUTPATIENT
Start: 2024-10-28

## 2024-10-28 RX ORDER — GLIPIZIDE 5 MG/1
5 TABLET ORAL 2 TIMES DAILY WITH MEALS
Qty: 180 TABLET | Refills: 1 | Status: SHIPPED | OUTPATIENT
Start: 2024-10-28

## 2024-10-28 RX ORDER — HYDROCHLOROTHIAZIDE 25 MG/1
25 TABLET ORAL DAILY
Qty: 90 TABLET | Refills: 1 | Status: SHIPPED | OUTPATIENT
Start: 2024-10-28

## 2024-10-28 RX ORDER — LOVASTATIN 40 MG/1
40 TABLET ORAL NIGHTLY
Qty: 90 TABLET | Refills: 1 | Status: SHIPPED | OUTPATIENT
Start: 2024-10-28

## 2024-10-28 RX ORDER — SEMAGLUTIDE 2.68 MG/ML
2 INJECTION, SOLUTION SUBCUTANEOUS
Qty: 3 ML | Refills: 2 | Status: SHIPPED | OUTPATIENT
Start: 2024-10-28

## 2024-10-28 RX ORDER — TRAZODONE HYDROCHLORIDE 100 MG/1
100 TABLET ORAL NIGHTLY PRN
Qty: 90 TABLET | Refills: 1 | Status: SHIPPED | OUTPATIENT
Start: 2024-10-28

## 2024-10-28 RX ORDER — CARIPRAZINE 1.5 MG/1
1.5 CAPSULE, GELATIN COATED ORAL DAILY
Qty: 90 CAPSULE | Refills: 1 | Status: SHIPPED | OUTPATIENT
Start: 2024-10-28

## 2024-10-28 RX ORDER — METFORMIN HYDROCHLORIDE 1000 MG/1
1000 TABLET ORAL 2 TIMES DAILY
Qty: 180 TABLET | Refills: 1 | Status: SHIPPED | OUTPATIENT
Start: 2024-10-28

## 2024-11-06 LAB
LEFT EYE DM RETINOPATHY: NEGATIVE
RIGHT EYE DM RETINOPATHY: NEGATIVE

## 2024-11-20 ENCOUNTER — PATIENT OUTREACH (OUTPATIENT)
Dept: PRIMARY CARE CLINIC | Facility: CLINIC | Age: 59
End: 2024-11-20
Payer: MEDICARE

## 2024-12-05 ENCOUNTER — TELEPHONE (OUTPATIENT)
Dept: FAMILY MEDICINE | Facility: CLINIC | Age: 59
End: 2024-12-05
Payer: MEDICARE

## 2024-12-05 NOTE — TELEPHONE ENCOUNTER
----- Message from Med Assistant Woodson sent at 12/5/2024  1:19 PM CST -----  Who Called: Hailey Nixon    Caller is requesting a sooner appointment. Caller declined first available appointment listed below. Caller will not accept being placed on the waitlist and is requesting a message be sent to doctor.    When is the first available appointment?12-19  Symptoms:stomach pain       Preferred Method of Contact: Phone Call  Patient's Preferred Phone Number on File: [phone]   Best Call Back Number, if different:  Additional Information: sooner appt

## 2024-12-12 ENCOUNTER — TELEPHONE (OUTPATIENT)
Dept: FAMILY MEDICINE | Facility: CLINIC | Age: 59
End: 2024-12-12
Payer: MEDICARE

## 2024-12-12 NOTE — TELEPHONE ENCOUNTER
----- Message from Alannah sent at 12/12/2024 10:20 AM CST -----  Who Called: Hailey Duval    Patient is returning phone call    Who Left Message for Patient:no name   Does the patient know what this is regarding?:no phone encounter       Preferred Method of Contact: Phone Call  Patient's Preferred Phone Number on File: [phone]   Best Call Back Number, if different:  Additional Information:

## 2024-12-13 ENCOUNTER — OFFICE VISIT (OUTPATIENT)
Dept: FAMILY MEDICINE | Facility: CLINIC | Age: 59
End: 2024-12-13
Payer: MEDICARE

## 2024-12-13 VITALS
HEART RATE: 86 BPM | TEMPERATURE: 97 F | SYSTOLIC BLOOD PRESSURE: 92 MMHG | RESPIRATION RATE: 16 BRPM | HEIGHT: 60 IN | WEIGHT: 169.31 LBS | OXYGEN SATURATION: 98 % | DIASTOLIC BLOOD PRESSURE: 69 MMHG | BODY MASS INDEX: 33.24 KG/M2

## 2024-12-13 DIAGNOSIS — K58.2 IRRITABLE BOWEL SYNDROME WITH BOTH CONSTIPATION AND DIARRHEA: Primary | ICD-10-CM

## 2024-12-13 DIAGNOSIS — K21.9 GASTROESOPHAGEAL REFLUX DISEASE WITHOUT ESOPHAGITIS: ICD-10-CM

## 2024-12-13 NOTE — PROGRESS NOTES
ALEX Rodriguez   [facility] [address] [place] [address]  [phone]      PATIENT NAME: Hailey Duval  : 1965  DATE: 24  MRN: [id number]      Billing Provider: ALEX Rodriguez  Level of Service:   Patient PCP Information       Provider PCP Type    ALEX Rodriguez General            Reason for Visit / Chief Complaint: GI Problem and Nausea       Update PCP  Update Chief Complaint         History of Present Illness / Problem Focused Workflow     Hailey Duval presents to the clinic with GI Problem and Nausea     Patient presents with complaints of occasional nausea vomiting and abdominal pain.  Typically around the time that she takes her Ozempic shot.  Discussed with patient this has likely side effect of her medication areas she does also have irritable bowel syndrome.  Encouraged her to make sure she is eating small frequent meals instead of 3 large meals a day.  Also make sure that she is drinking plenty of water to stay hydrated to help with gut motility.  She verbalized understanding and agreement        Review of Systems     Review of Systems   Gastrointestinal:  Positive for abdominal distention, diarrhea, nausea and vomiting.        Medical / Social / Family History     Past Medical History:   Diagnosis Date    Anemia     Anemia, vitamin B12 deficiency     Chronic idiopathic constipation     chronic kidney disease     Depression     Diabetes mellitus, type 2     Dysphagia     GERD (gastroesophageal reflux disease)     Hyperlipidemia     Hypertension     Left ventricular hypertrophy     Nonrheumatic tricuspid (valve) insufficiency     Obese     Osteoarthritis of knee, unspecified     Ulcer of heel and midfoot        Past Surgical History:   Procedure Laterality Date     SECTION      FOOT SURGERY      JOINT REPLACEMENT Right     Hip    OTHER SURGICAL HISTORY      Foot  and Toe surgery procedure screws in right foot        Social History  Ms. Duval  reports that she has never smoked. She has never been exposed to tobacco smoke. She has never used smokeless tobacco. She reports current drug use. Drug: Marijuana. She reports that she does not drink alcohol.    Family History  Ms. Duval's family history includes Breast cancer in her sister; Diabetes in her mother; Glaucoma in an other family member; Heart disease in an other family member; Hyperlipidemia in an other family member; Hypertension in her father and mother.    Medications and Allergies     Medications  Outpatient Medications Marked as Taking for the 12/13/24 encounter (Office Visit) with Raiza David ACNP   Medication Sig Dispense Refill    alcohol swabs (BD ALCOHOL SWABS) PadM Use as directed 300 each 4    aspirin (ECOTRIN) 81 MG EC tablet Take 1 tablet (81 mg total) by mouth once daily. 90 tablet 3    blood sugar diagnostic Strp Use twice daily for glucose monitoring 150 strip 4    blood-glucose meter (ACCU-CHEK GUIDE GLUCOSE METER) Misc Use as directed 1 each 3    cariprazine (VRAYLAR) 1.5 mg Cap Take 1 capsule (1.5 mg total) by mouth once daily. 90 capsule 1    diclofenac sodium (VOLTAREN) 1 % Gel Apply 2 g topically 4 (four) times daily. 200 g 0    ergocalciferol (ERGOCALCIFEROL) 50,000 unit Cap Take 1 capsule (50,000 Units total) by mouth every 7 days. 15 capsule 3    esomeprazole (NEXIUM) 40 MG capsule Take 1 capsule (40 mg total) by mouth once daily. 90 capsule 1    glipiZIDE (GLUCOTROL) 5 MG tablet Take 1 tablet (5 mg total) by mouth 2 (two) times daily with meals. 180 tablet 1    hydroCHLOROthiazide (HYDRODIURIL) 25 MG tablet Take 1 tablet (25 mg total) by mouth once daily. 90 tablet 1    ibuprofen (ADVIL,MOTRIN) 600 MG tablet Take 1 tablet (600 mg total) by mouth every 6 (six) hours as needed for Pain. 30 tablet 0    lancets 32 gauge Misc Use twice daily for monitoring blood glucose 100 each 5     lovastatin (MEVACOR) 40 MG tablet Take 1 tablet (40 mg total) by mouth every evening. 90 tablet 1    metFORMIN (GLUCOPHAGE) 1000 MG tablet Take 1 tablet (1,000 mg total) by mouth 2 (two) times daily. 180 tablet 1    nystatin (MYCOSTATIN) cream Apply topically 2 (two) times daily. 30 g 1    semaglutide (OZEMPIC) 2 mg/dose (8 mg/3 mL) PnIj Inject 2 mg into the skin every 7 days. 3 mL 2    traZODone (DESYREL) 100 MG tablet Take 1 tablet (100 mg total) by mouth nightly as needed for Insomnia. 90 tablet 1       Allergies  Review of patient's allergies indicates:  No Known Allergies    Physical Examination     Vitals:    12/13/24 0857   BP: 92/69   Pulse:    Resp:    Temp:      Physical Exam  Eyes:      Pupils: Pupils are equal, round, and reactive to light.   Cardiovascular:      Rate and Rhythm: Normal rate and regular rhythm.      Heart sounds: Normal heart sounds. No murmur heard.  Pulmonary:      Breath sounds: Normal breath sounds. No wheezing, rhonchi or rales.   Abdominal:      General: Bowel sounds are normal.      Palpations: Abdomen is soft.      Tenderness: There is no guarding.      Hernia: No hernia is present.   Musculoskeletal:         General: No swelling.      Cervical back: Normal range of motion and neck supple.   Skin:     General: Skin is warm and dry.   Neurological:      Mental Status: She is alert and oriented to person, place, and time.          Lab Results   Component Value Date    WBC 6.89 10/28/2024    HGB 12.1 10/28/2024    HCT 40.3 10/28/2024    MCV 87.2 10/28/2024     10/28/2024        Sodium   Date Value Ref Range Status   10/28/2024 138 136 - 145 mmol/L Final     Potassium   Date Value Ref Range Status   10/28/2024 3.8 3.5 - 5.1 mmol/L Final     Chloride   Date Value Ref Range Status   10/28/2024 107 98 - 107 mmol/L Final     CO2   Date Value Ref Range Status   10/28/2024 27 21 - 32 mmol/L Final     Glucose   Date Value Ref Range Status   10/28/2024 66 (L) 74 - 106 mg/dL Final  "    BUN   Date Value Ref Range Status   10/28/2024 20 (H) 7 - 18 mg/dL Final     Creatinine   Date Value Ref Range Status   10/28/2024 0.88 0.55 - 1.02 mg/dL Final     Calcium   Date Value Ref Range Status   10/28/2024 9.4 8.5 - 10.1 mg/dL Final     Total Protein   Date Value Ref Range Status   10/28/2024 7.2 6.4 - 8.2 g/dL Final     Albumin   Date Value Ref Range Status   10/28/2024 3.3 (L) 3.5 - 5.0 g/dL Final     Bilirubin, Total   Date Value Ref Range Status   10/28/2024 0.2 >0.0 - 1.2 mg/dL Final     Alk Phos   Date Value Ref Range Status   10/28/2024 91 46 - 118 U/L Final     AST   Date Value Ref Range Status   10/28/2024 12 (L) 15 - 37 U/L Final     ALT   Date Value Ref Range Status   10/28/2024 15 13 - 56 U/L Final     Anion Gap   Date Value Ref Range Status   10/28/2024 8 7 - 16 mmol/L Final     eGFR   Date Value Ref Range Status   10/28/2024 76 >=60 mL/min/1.73m2 Final      Lab Results   Component Value Date    HGBA1C 5.9 10/28/2024      Lab Results   Component Value Date    CHOL 186 10/28/2024    CHOL 224 (H) 03/28/2024    CHOL 197 09/28/2023     Lab Results   Component Value Date    HDL 72 (H) 10/28/2024    HDL 74 (H) 03/28/2024    HDL 68 (H) 09/28/2023     Lab Results   Component Value Date    LDLCALC 98 10/28/2024    LDLCALC 136 03/28/2024    LDLCALC 101 09/28/2023     No results found for: "DLDL"  Lab Results   Component Value Date    TRIG 78 10/28/2024    TRIG 70 03/28/2024    TRIG 140 09/28/2023     Lab Results   Component Value Date    CHOLHDL 2.6 10/28/2024    CHOLHDL 3.0 03/28/2024    CHOLHDL 2.9 09/28/2023      Lab Results   Component Value Date    TSH 1.690 02/01/2022        Assessment and Plan (including Health Maintenance)      Problem List  Smart Sets  Document Outside HM   :    Plan:     1. Irritable bowel syndrome with both constipation and diarrhea    2. Gastroesophageal reflux disease without esophagitis         There are no Patient Instructions on file for this visit.     Health " Maintenance Due   Topic Date Due    TETANUS VACCINE  Never done    Mammogram  01/08/2025         Health Maintenance Topics with due status: Not Due       Topic Last Completion Date    Cervical Cancer Screening 09/15/2021    Colorectal Cancer Screening 08/27/2024    Low Dose Statin 10/28/2024    Diabetes Urine Screening 10/28/2024    Foot Exam 10/28/2024    Lipid Panel 10/28/2024    Hemoglobin A1c 10/28/2024    Eye Exam 11/06/2024    RSV Vaccine (Age 60+ and Pregnant patients) Not Due       Future Appointments   Date Time Provider Department Center   1/13/2025  8:00 AM [facility] MAM[facility]2 [facility] [place]   5/1/2025  8:00 AM Raiza David ACNP [facility] [place]   8/25/2025 10:00 AM [facility] NURSE, [facility] [place]            Signature:  ALEX Rodriguez  [facility] [address] [place] [address]  [phone]    Date of encounter: 12/13/24

## 2025-01-13 ENCOUNTER — HOSPITAL ENCOUNTER (OUTPATIENT)
Dept: RADIOLOGY | Facility: HOSPITAL | Age: 60
Discharge: HOME | End: 2025-01-13
Attending: NURSE PRACTITIONER
Payer: MEDICARE

## 2025-01-13 VITALS — WEIGHT: 162 LBS | BODY MASS INDEX: 31.8 KG/M2 | HEIGHT: 60 IN

## 2025-01-13 DIAGNOSIS — Z12.31 VISIT FOR SCREENING MAMMOGRAM: ICD-10-CM

## 2025-01-13 PROCEDURE — 77063 BREAST TOMOSYNTHESIS BI: CPT | Mod: TC

## 2025-01-15 DIAGNOSIS — E11.69 HYPERLIPIDEMIA ASSOCIATED WITH TYPE 2 DIABETES MELLITUS: ICD-10-CM

## 2025-01-15 DIAGNOSIS — I10 ESSENTIAL HYPERTENSION: ICD-10-CM

## 2025-01-15 DIAGNOSIS — E78.5 HYPERLIPIDEMIA ASSOCIATED WITH TYPE 2 DIABETES MELLITUS: ICD-10-CM

## 2025-01-15 DIAGNOSIS — E11.9 TYPE 2 DIABETES MELLITUS WITHOUT COMPLICATION, WITHOUT LONG-TERM CURRENT USE OF INSULIN: ICD-10-CM

## 2025-01-15 RX ORDER — METFORMIN HYDROCHLORIDE 1000 MG/1
1000 TABLET ORAL 2 TIMES DAILY
Qty: 180 TABLET | Refills: 1 | Status: SHIPPED | OUTPATIENT
Start: 2025-01-15

## 2025-01-15 RX ORDER — ESOMEPRAZOLE MAGNESIUM 40 MG/1
40 CAPSULE, DELAYED RELEASE ORAL DAILY
Qty: 90 CAPSULE | Refills: 1 | Status: SHIPPED | OUTPATIENT
Start: 2025-01-15

## 2025-01-15 RX ORDER — LOVASTATIN 40 MG/1
40 TABLET ORAL NIGHTLY
Qty: 90 TABLET | Refills: 1 | Status: SHIPPED | OUTPATIENT
Start: 2025-01-15

## 2025-01-15 RX ORDER — GLIPIZIDE 5 MG/1
5 TABLET ORAL 2 TIMES DAILY WITH MEALS
Qty: 180 TABLET | Refills: 1 | Status: SHIPPED | OUTPATIENT
Start: 2025-01-15

## 2025-01-15 RX ORDER — HYDROCHLOROTHIAZIDE 25 MG/1
25 TABLET ORAL DAILY
Qty: 90 TABLET | Refills: 1 | Status: SHIPPED | OUTPATIENT
Start: 2025-01-15

## 2025-01-15 RX ORDER — SEMAGLUTIDE 2.68 MG/ML
2 INJECTION, SOLUTION SUBCUTANEOUS
Qty: 3 ML | Refills: 2 | Status: SHIPPED | OUTPATIENT
Start: 2025-01-15

## 2025-03-24 RX ORDER — SEMAGLUTIDE 2.68 MG/ML
2 INJECTION, SOLUTION SUBCUTANEOUS
Qty: 9 EACH | Refills: 1 | Status: SHIPPED | OUTPATIENT
Start: 2025-03-24

## 2025-03-24 RX ORDER — LANCETS 33 GAUGE
EACH MISCELLANEOUS
Qty: 100 EACH | Refills: 5 | Status: SHIPPED | OUTPATIENT
Start: 2025-03-24

## 2025-04-08 ENCOUNTER — TELEPHONE (OUTPATIENT)
Dept: FAMILY MEDICINE | Facility: CLINIC | Age: 60
End: 2025-04-08
Payer: MEDICARE

## 2025-04-08 RX ORDER — LANCETS 33 GAUGE
1 EACH MISCELLANEOUS DAILY
Qty: 100 EACH | Refills: 5 | Status: SHIPPED | OUTPATIENT
Start: 2025-04-08

## 2025-04-08 NOTE — TELEPHONE ENCOUNTER
----- Message from Giselle sent at 2025  9:16 AM CDT -----  Regarding: RX Refill  Who Called: Hailey Auroranataliia is requesting assistance/information from provider's office.List of preferred pharmacies on file (remove unneeded): [unfilled]If different, enter pharmacy into here including location and phone number: [facility] [address] [address]Fdnnl: [phone] Fax: [phone]: Not open 24 hoursPreferred Method of Contact: Phone CallPatient's Preferred Phone Number on File: [phone] Best Call Back Number, if different:Additional Information: Hailey called in stating that she needs refills on blood sugar diagnostic Strp and TRUEPLUS LANCETS 33 gauge Misc. Her refills have  for both and she is out. Please give Hailey a courtesy call regarding this issue if needed. Thanks.

## 2025-05-01 ENCOUNTER — OFFICE VISIT (OUTPATIENT)
Dept: FAMILY MEDICINE | Facility: CLINIC | Age: 60
End: 2025-05-01
Payer: MEDICARE

## 2025-05-01 VITALS
OXYGEN SATURATION: 98 % | HEART RATE: 74 BPM | HEIGHT: 60 IN | SYSTOLIC BLOOD PRESSURE: 116 MMHG | BODY MASS INDEX: 31.02 KG/M2 | TEMPERATURE: 98 F | WEIGHT: 158 LBS | DIASTOLIC BLOOD PRESSURE: 80 MMHG

## 2025-05-01 DIAGNOSIS — E78.5 HYPERLIPIDEMIA ASSOCIATED WITH TYPE 2 DIABETES MELLITUS: ICD-10-CM

## 2025-05-01 DIAGNOSIS — F31.9 BIPOLAR DEPRESSION: ICD-10-CM

## 2025-05-01 DIAGNOSIS — I10 ESSENTIAL HYPERTENSION: Primary | ICD-10-CM

## 2025-05-01 DIAGNOSIS — K29.60 REFLUX GASTRITIS: ICD-10-CM

## 2025-05-01 DIAGNOSIS — S60.512A ABRASION OF LEFT HAND, INITIAL ENCOUNTER: ICD-10-CM

## 2025-05-01 DIAGNOSIS — E11.9 TYPE 2 DIABETES MELLITUS WITHOUT COMPLICATION, WITHOUT LONG-TERM CURRENT USE OF INSULIN: ICD-10-CM

## 2025-05-01 DIAGNOSIS — E11.69 HYPERLIPIDEMIA ASSOCIATED WITH TYPE 2 DIABETES MELLITUS: ICD-10-CM

## 2025-05-01 PROBLEM — E66.01 MORBID OBESITY: Status: RESOLVED | Noted: 2024-03-28 | Resolved: 2025-05-01

## 2025-05-01 LAB
ALBUMIN SERPL BCP-MCNC: 3.9 G/DL (ref 3.5–5)
ALBUMIN/GLOB SERPL: 1.1 {RATIO}
ALP SERPL-CCNC: 96 U/L (ref 40–150)
ALT SERPL W P-5'-P-CCNC: 8 U/L
ANION GAP SERPL CALCULATED.3IONS-SCNC: 16 MMOL/L (ref 7–16)
AST SERPL W P-5'-P-CCNC: 18 U/L (ref 11–45)
BILIRUB SERPL-MCNC: 0.4 MG/DL
BUN SERPL-MCNC: 13 MG/DL (ref 10–20)
BUN/CREAT SERPL: 13 (ref 6–20)
CALCIUM SERPL-MCNC: 9.4 MG/DL (ref 8.4–10.2)
CHLORIDE SERPL-SCNC: 101 MMOL/L (ref 98–107)
CHOLEST SERPL-MCNC: 210 MG/DL
CHOLEST/HDLC SERPL: 3 {RATIO}
CO2 SERPL-SCNC: 25 MMOL/L (ref 22–29)
CREAT SERPL-MCNC: 1.04 MG/DL (ref 0.55–1.02)
CREAT UR-MCNC: 203 MG/DL (ref 15–325)
EGFR (NO RACE VARIABLE) (RUSH/TITUS): 62 ML/MIN/1.73M2
EST. AVERAGE GLUCOSE BLD GHB EST-MCNC: 108 MG/DL
GLOBULIN SER-MCNC: 3.7 G/DL (ref 2–4)
GLUCOSE SERPL-MCNC: 82 MG/DL (ref 74–100)
HBA1C MFR BLD HPLC: 5.4 %
HDLC SERPL-MCNC: 69 MG/DL (ref 35–60)
LDLC SERPL CALC-MCNC: 125 MG/DL
LDLC/HDLC SERPL: 1.8 {RATIO}
MICROALBUMIN UR-MCNC: 0.9 MG/DL
MICROALBUMIN/CREAT RATIO PNL UR: 4.4 MG/G (ref 0–30)
NONHDLC SERPL-MCNC: 141 MG/DL
POTASSIUM SERPL-SCNC: 4.1 MMOL/L (ref 3.5–5.1)
PROT SERPL-MCNC: 7.6 G/DL (ref 6.4–8.3)
SODIUM SERPL-SCNC: 138 MMOL/L (ref 136–145)
TRIGL SERPL-MCNC: 78 MG/DL (ref 37–140)
UREA BREATH TEST QL: NEGATIVE
VLDLC SERPL-MCNC: 16 MG/DL

## 2025-05-01 PROCEDURE — 80053 COMPREHEN METABOLIC PANEL: CPT | Mod: ,,, | Performed by: CLINICAL MEDICAL LABORATORY

## 2025-05-01 PROCEDURE — 80061 LIPID PANEL: CPT | Mod: ,,, | Performed by: CLINICAL MEDICAL LABORATORY

## 2025-05-01 PROCEDURE — 82043 UR ALBUMIN QUANTITATIVE: CPT | Mod: ,,, | Performed by: CLINICAL MEDICAL LABORATORY

## 2025-05-01 PROCEDURE — 82570 ASSAY OF URINE CREATININE: CPT | Mod: ,,, | Performed by: CLINICAL MEDICAL LABORATORY

## 2025-05-01 PROCEDURE — 83013 H PYLORI (C-13) BREATH: CPT | Mod: ,,, | Performed by: CLINICAL MEDICAL LABORATORY

## 2025-05-01 PROCEDURE — 83036 HEMOGLOBIN GLYCOSYLATED A1C: CPT | Mod: ,,, | Performed by: CLINICAL MEDICAL LABORATORY

## 2025-05-01 RX ORDER — LANCETS 33 GAUGE
1 EACH MISCELLANEOUS DAILY
Qty: 100 EACH | Refills: 5 | Status: SHIPPED | OUTPATIENT
Start: 2025-05-01

## 2025-05-01 NOTE — ASSESSMENT & PLAN NOTE
"Lab Results   Component Value Date    CHOL 186 10/28/2024    CHOL 224 (H) 03/28/2024    CHOL 197 09/28/2023      Lab Results   Component Value Date    HDL 72 (H) 10/28/2024    HDL 74 (H) 03/28/2024    HDL 68 (H) 09/28/2023      No results found for: "LDL"   Lab Results   Component Value Date    TRIG 78 10/28/2024    TRIG 70 03/28/2024    TRIG 140 09/28/2023      No results found for: "DLDL"       Lab Results   Component Value Date    CHOLHDL 2.6 10/28/2024    CHOLHDL 3.0 03/28/2024    CHOLHDL 2.9 09/28/2023     The current medical regimen is effective;  continue present plan and medications.  Stable on mevacor    "

## 2025-05-01 NOTE — PROGRESS NOTES
ALEX Rodriguez   [facility] [address] [place] [address]  [phone]      PATIENT NAME: Hailey Duval  : 1965  DATE: 25  MRN: [id number]      Billing Provider: ALEX Rodriguez  Level of Service:   Patient PCP Information       Provider PCP Type    ALEX Rodriguez General            Reason for Visit / Chief Complaint: Follow-up, Hypertension, Hyperlipidemia, Diabetes, Health Maintenance (..TETANUS VACCINE Never done/Hemoglobin A1c due on ), and mouth odor (Patient complains of of bad odor when she burps. She states its so bad she dont like to be around anyone. Patient states it is not all the time. )       Update PCP  Update Chief Complaint         History of Present Illness / Problem Focused Workflow     Hailey Duval presents to the clinic with Follow-up, Hypertension, Hyperlipidemia, Diabetes, Health Maintenance (..TETANUS VACCINE Never done/Hemoglobin A1c due on ), and mouth odor (Patient complains of of bad odor when she burps. She states its so bad she dont like to be around anyone. Patient states it is not all the time. )     Pt presents for routine follow up with lab and med refills. Overall doing well.      BP appears  controlled today. Home meds reviewed    The current medical regimen is effective;  continue present plan and medications. Recommended patient to check home readings to monitor and see me for followup as scheduled or sooner as needed.   Discussed sodium restriction, maintaining ideal body weight and regular exercise program as physiologic means to continue to achieve blood pressure control in addition to medication compliance.  Patient was educated that both decongestant and anti-inflammatory medication may raise blood pressure.    Advised to monitor BP at home. Advised on optimal BP readings - SBP < 130 & DBP < 80. Advised to call office  for any persistent BP elevation and may have to prescribe or adjust BP med(s).  Recommended DASH diet, stay well hydrated with water daily, eliminate or decrease caffeinated and high calorie drinks, increase physical activity, and lose weight if BMI > 25.0. Written patient education information provided to patient with goals and recommendations to assist with BP management.     Discussed need for low fat/low cholesterol diet, regular exercise, and weight control.   Cardiovascular risk and specific lipid/LDL goals reviewed.      Diabetes treatment goals: (unless otherwise indicated due to risk factor stratification)  To achieve normal or near normal glucose levels.  Fasting glucose:   Before meals: 100-140  2 hours after meal: less 130  Bedtime goal glucose > 100  Avoid concentrated sweets/sugars, limit carbohydrates and cholesterol  Previous lab reviewed, goal A1c less 7    Pt has complaints today of foul smell with belching. Will test for Hpylori. She is on PPI therapy.        Review of Systems     Review of Systems   Constitutional:  Negative for fatigue and fever.   HENT:  Negative for nasal congestion and sore throat.    Eyes:  Negative for visual disturbance.   Respiratory:  Negative for chest tightness and shortness of breath.    Cardiovascular:  Negative for chest pain and leg swelling.   Gastrointestinal:  Negative for abdominal pain and change in bowel habit.   Endocrine: Negative for polydipsia, polyphagia and polyuria.   Genitourinary:  Negative for dysuria and hematuria.   Musculoskeletal:  Negative for back pain and leg pain.   Integumentary:  Negative for rash.   Neurological:  Negative for dizziness, syncope, weakness and light-headedness.        Medical / Social / Family History     Past Medical History:   Diagnosis Date    Anemia     Anemia, vitamin B12 deficiency     Chronic idiopathic constipation     chronic kidney disease     Depression     Diabetes mellitus, type 2     Dysphagia     GERD  (gastroesophageal reflux disease)     Hyperlipidemia     Hypertension     Left ventricular hypertrophy     Nonrheumatic tricuspid (valve) insufficiency     Obese     Osteoarthritis of knee, unspecified     Ulcer of heel and midfoot        Past Surgical History:   Procedure Laterality Date     SECTION      FOOT SURGERY      JOINT REPLACEMENT Right     Hip    OTHER SURGICAL HISTORY      Foot and Toe surgery procedure screws in right foot        Social History  Ms. Duval  reports that she has never smoked. She has never been exposed to tobacco smoke. She has never used smokeless tobacco. She reports current drug use. Drug: Marijuana. She reports that she does not drink alcohol.    Family History  Ms. Duval's family history includes Breast cancer in her sister; Diabetes in her mother; Glaucoma in an other family member; Heart disease in an other family member; Hyperlipidemia in an other family member; Hypertension in her father and mother.    Medications and Allergies     Medications  Outpatient Medications Marked as Taking for the 25 encounter (Office Visit) with Raiza David ACNP   Medication Sig Dispense Refill    alcohol swabs (BD ALCOHOL SWABS) PadM Use as directed 300 each 4    aspirin (ECOTRIN) 81 MG EC tablet Take 1 tablet (81 mg total) by mouth once daily. 90 tablet 3    blood-glucose meter (ACCU-CHEK GUIDE GLUCOSE METER) Misc Use as directed 1 each 3    cariprazine (VRAYLAR) 1.5 mg Cap Take 1 capsule (1.5 mg total) by mouth once daily. 90 capsule 1    diclofenac sodium (VOLTAREN) 1 % Gel Apply 2 g topically 4 (four) times daily. 200 g 0    ergocalciferol (ERGOCALCIFEROL) 50,000 unit Cap Take 1 capsule (50,000 Units total) by mouth every 7 days. 15 capsule 3    esomeprazole (NEXIUM) 40 MG capsule Take 1 capsule (40 mg total) by mouth once daily. 90 capsule 1    glipiZIDE (GLUCOTROL) 5 MG tablet Take 1 tablet (5 mg total) by mouth 2 (two) times daily with meals. 180 tablet 1     hydroCHLOROthiazide (HYDRODIURIL) 25 MG tablet Take 1 tablet (25 mg total) by mouth once daily. 90 tablet 1    HYDROcodone-acetaminophen (NORCO)  mg per tablet Take 1 tablet by mouth daily as needed.       lovastatin (MEVACOR) 40 MG tablet Take 1 tablet (40 mg total) by mouth every evening. 90 tablet 1    metFORMIN (GLUCOPHAGE) 1000 MG tablet Take 1 tablet (1,000 mg total) by mouth 2 (two) times daily. 180 tablet 1    semaglutide (OZEMPIC) 2 mg/dose (8 mg/3 mL) PnIj INJECT 2 MG INTO THE SKIN EVERY 7 DAYS. 9 each 1    traZODone (DESYREL) 100 MG tablet Take 1 tablet (100 mg total) by mouth nightly as needed for Insomnia. 90 tablet 1    [DISCONTINUED] blood sugar diagnostic Strp Use twice daily for glucose monitoring 150 strip 4    [DISCONTINUED] lancets (TRUEPLUS LANCETS) 33 gauge Misc Inject 1 lancet  into the skin Daily. 100 each 5     Current Facility-Administered Medications for the 5/1/25 encounter (Office Visit) with Raiza David ACNP   Medication Dose Route Frequency Provider Last Rate Last Admin    [COMPLETED] Tdap (BOOSTRIX) vaccine injection 0.5 mL  0.5 mL Intramuscular 1 time in Clinic/HOD Raiza David ACNP   0.5 mL at 05/01/25 0830       Allergies  Review of patient's allergies indicates:  No Known Allergies    Physical Examination     Vitals:    05/01/25 0810   BP: 116/80   Pulse: 74   Temp: 98.2 °F (36.8 °C)     Physical Exam  Eyes:      Pupils: Pupils are equal, round, and reactive to light.   Cardiovascular:      Rate and Rhythm: Normal rate and regular rhythm.      Heart sounds: Normal heart sounds. No murmur heard.  Pulmonary:      Breath sounds: Normal breath sounds. No wheezing, rhonchi or rales.   Abdominal:      General: Bowel sounds are normal.      Palpations: Abdomen is soft.   Musculoskeletal:         General: No swelling.      Cervical back: Normal range of motion and neck supple.   Skin:     General: Skin is warm and dry.   Neurological:      Mental Status: She is alert  "and oriented to person, place, and time.          Lab Results   Component Value Date    WBC 6.89 10/28/2024    HGB 12.1 10/28/2024    HCT 40.3 10/28/2024    MCV 87.2 10/28/2024     10/28/2024        Sodium   Date Value Ref Range Status   10/28/2024 138 136 - 145 mmol/L Final     Potassium   Date Value Ref Range Status   10/28/2024 3.8 3.5 - 5.1 mmol/L Final     Chloride   Date Value Ref Range Status   10/28/2024 107 98 - 107 mmol/L Final     CO2   Date Value Ref Range Status   10/28/2024 27 21 - 32 mmol/L Final     Glucose   Date Value Ref Range Status   10/28/2024 66 (L) 74 - 106 mg/dL Final     BUN   Date Value Ref Range Status   10/28/2024 20 (H) 7 - 18 mg/dL Final     Creatinine   Date Value Ref Range Status   10/28/2024 0.88 0.55 - 1.02 mg/dL Final     Calcium   Date Value Ref Range Status   10/28/2024 9.4 8.5 - 10.1 mg/dL Final     Total Protein   Date Value Ref Range Status   10/28/2024 7.2 6.4 - 8.2 g/dL Final     Albumin   Date Value Ref Range Status   10/28/2024 3.3 (L) 3.5 - 5.0 g/dL Final     Bilirubin, Total   Date Value Ref Range Status   10/28/2024 0.2 >0.0 - 1.2 mg/dL Final     Alk Phos   Date Value Ref Range Status   10/28/2024 91 46 - 118 U/L Final     AST   Date Value Ref Range Status   10/28/2024 12 (L) 15 - 37 U/L Final     ALT   Date Value Ref Range Status   10/28/2024 15 13 - 56 U/L Final     Anion Gap   Date Value Ref Range Status   10/28/2024 8 7 - 16 mmol/L Final     eGFR   Date Value Ref Range Status   10/28/2024 76 >=60 mL/min/1.73m2 Final      Lab Results   Component Value Date    HGBA1C 5.9 10/28/2024      Lab Results   Component Value Date    CHOL 186 10/28/2024    CHOL 224 (H) 03/28/2024    CHOL 197 09/28/2023     Lab Results   Component Value Date    HDL 72 (H) 10/28/2024    HDL 74 (H) 03/28/2024    HDL 68 (H) 09/28/2023     Lab Results   Component Value Date    LDLCALC 98 10/28/2024    LDLCALC 136 03/28/2024    LDLCALC 101 09/28/2023     No results found for: "DLDL"  Lab " "Results   Component Value Date    TRIG 78 10/28/2024    TRIG 70 03/28/2024    TRIG 140 09/28/2023     Lab Results   Component Value Date    CHOLHDL 2.6 10/28/2024    CHOLHDL 3.0 03/28/2024    CHOLHDL 2.9 09/28/2023      Lab Results   Component Value Date    TSH 1.690 02/01/2022        Assessment and Plan (including Health Maintenance)      Problem List  Smart Sets  Document Outside HM   :    Plan:     1. Essential hypertension  Assessment & Plan:  BP Readings from Last 3 Encounters:   05/01/25 116/80   12/13/24 92/69   10/28/24 116/83    Stable      Orders:  -     Comprehensive Metabolic Panel; Future; Expected date: 05/01/2025  -     Microalbumin/Creatinine Ratio, Urine    2. Type 2 diabetes mellitus without complication, without long-term current use of insulin  Assessment & Plan:  Lab Results   Component Value Date    HGBA1C 5.9 10/28/2024    HGBA1C 6.3 03/28/2024    HGBA1C 6.1 09/28/2023     Lab Results   Component Value Date    MICROALBUR 0.6 10/28/2024    LDLCALC 98 10/28/2024    CREATININE 0.88 10/28/2024    Cont metformin and ozempic     Orders:  -     Hemoglobin A1C; Future; Expected date: 05/01/2025    3. Reflux gastritis  -     H. pylori Breath Test; Future; Expected date: 05/01/2025    4. Abrasion of left hand, initial encounter  -     Tdap (BOOSTRIX) vaccine injection 0.5 mL    5. Hyperlipidemia associated with type 2 diabetes mellitus  Assessment & Plan:  Lab Results   Component Value Date    CHOL 186 10/28/2024    CHOL 224 (H) 03/28/2024    CHOL 197 09/28/2023      Lab Results   Component Value Date    HDL 72 (H) 10/28/2024    HDL 74 (H) 03/28/2024    HDL 68 (H) 09/28/2023      No results found for: "LDL"   Lab Results   Component Value Date    TRIG 78 10/28/2024    TRIG 70 03/28/2024    TRIG 140 09/28/2023      No results found for: "DLDL"       Lab Results   Component Value Date    CHOLHDL 2.6 10/28/2024    CHOLHDL 3.0 03/28/2024    CHOLHDL 2.9 09/28/2023     The current medical regimen is " effective;  continue present plan and medications.  Stable on mevacor      Orders:  -     Lipid Panel; Future; Expected date: 05/01/2025    6. Bipolar depression    Other orders  -     blood sugar diagnostic Strp; Use twice daily for glucose monitoring  Dispense: 150 strip; Refill: 4  -     lancets (TRUEPLUS LANCETS) 33 gauge Misc; Inject 1 lancet  into the skin Daily.  Dispense: 100 each; Refill: 5         Patient Instructions   Christopher Hart,     If you are due for any health screening(s) below please notify me so we can arrange them to be ordered and scheduled. Most healthy patients at your age complete them, but you are free to accept or refuse.     If you can't do it, I'll definitely understand. If you can, I'd certainly appreciate it!    All of your core healthy metrics are met.                       Health Maintenance Due   Topic Date Due    Hemoglobin A1c  04/28/2025         Health Maintenance Topics with due status: Not Due       Topic Last Completion Date    Cervical Cancer Screening 09/15/2021    Colorectal Cancer Screening 08/27/2024    Diabetes Urine Screening 10/28/2024    Foot Exam 10/28/2024    Lipid Panel 10/28/2024    Diabetic Eye Exam 11/06/2024    Mammogram 01/13/2025    TETANUS VACCINE 05/01/2025    Low Dose Statin 05/01/2025    RSV Vaccine (Age 60+ and Pregnant patients) Not Due       Future Appointments   Date Time Provider Department Center   8/25/2025 10:00 AM AW[facility] NURSE, [facility]   11/5/2025  8:20 AM Raiza David ACNP [facility] [place] [facility]   1/16/2026  8:00 AM [facility]            Signature:  ALXE Rodriguez  [facility] [address] [place] [address]  [phone]    Date of encounter: 5/1/25

## 2025-05-01 NOTE — ASSESSMENT & PLAN NOTE
BP Readings from Last 3 Encounters:   05/01/25 116/80   12/13/24 92/69   10/28/24 116/83    Stable

## 2025-05-01 NOTE — ASSESSMENT & PLAN NOTE
Lab Results   Component Value Date    HGBA1C 5.9 10/28/2024    HGBA1C 6.3 03/28/2024    HGBA1C 6.1 09/28/2023     Lab Results   Component Value Date    MICROALBUR 0.6 10/28/2024    LDLCALC 98 10/28/2024    CREATININE 0.88 10/28/2024    Cont metformin and ozempic

## 2025-05-01 NOTE — PATIENT INSTRUCTIONS
Christopher Hart,     If you are due for any health screening(s) below please notify me so we can arrange them to be ordered and scheduled. Most healthy patients at your age complete them, but you are free to accept or refuse.     If you can't do it, I'll definitely understand. If you can, I'd certainly appreciate it!    All of your core healthy metrics are met.

## 2025-05-02 ENCOUNTER — RESULTS FOLLOW-UP (OUTPATIENT)
Dept: FAMILY MEDICINE | Facility: CLINIC | Age: 60
End: 2025-05-02

## 2025-07-05 DIAGNOSIS — E11.9 TYPE 2 DIABETES MELLITUS WITHOUT COMPLICATION, WITHOUT LONG-TERM CURRENT USE OF INSULIN: ICD-10-CM

## 2025-07-05 DIAGNOSIS — E78.5 HYPERLIPIDEMIA ASSOCIATED WITH TYPE 2 DIABETES MELLITUS: ICD-10-CM

## 2025-07-05 DIAGNOSIS — I10 ESSENTIAL HYPERTENSION: ICD-10-CM

## 2025-07-05 DIAGNOSIS — E11.69 HYPERLIPIDEMIA ASSOCIATED WITH TYPE 2 DIABETES MELLITUS: ICD-10-CM

## 2025-07-07 RX ORDER — LOVASTATIN 40 MG/1
40 TABLET ORAL NIGHTLY
Qty: 90 TABLET | Refills: 1 | Status: SHIPPED | OUTPATIENT
Start: 2025-07-07

## 2025-07-07 RX ORDER — ESOMEPRAZOLE MAGNESIUM 40 MG/1
40 CAPSULE, DELAYED RELEASE ORAL
Qty: 90 CAPSULE | Refills: 1 | Status: SHIPPED | OUTPATIENT
Start: 2025-07-07

## 2025-07-07 RX ORDER — GLIPIZIDE 5 MG/1
5 TABLET ORAL 2 TIMES DAILY WITH MEALS
Qty: 180 TABLET | Refills: 1 | Status: SHIPPED | OUTPATIENT
Start: 2025-07-07

## 2025-07-07 RX ORDER — METFORMIN HYDROCHLORIDE 1000 MG/1
1000 TABLET ORAL 2 TIMES DAILY
Qty: 180 TABLET | Refills: 1 | Status: SHIPPED | OUTPATIENT
Start: 2025-07-07

## 2025-07-07 RX ORDER — HYDROCHLOROTHIAZIDE 25 MG/1
25 TABLET ORAL
Qty: 90 TABLET | Refills: 1 | Status: SHIPPED | OUTPATIENT
Start: 2025-07-07

## 2025-07-07 NOTE — TELEPHONE ENCOUNTER
Copied from CRM #[id number]. Topic: Medications - Medication Refill  >> Jul 7, 2025  8:14 AM Denia wrote:  Who Called: Hailey Duval    Refill or New Rx:Refill  RX Name and Strength:semaglutide (OZEMPIC) 2 mg/dose (8 mg/3 mL) PnIj  How is the patient currently taking it? (ex. 1XDay):INJECT 2 MG INTO THE SKIN EVERY 7 DAYS. - Subcutaneous  Is this a 30 day or 90 day RX:n/a  [place] or [place] Order[place] [place] [address] [place] [address]      Preferred Method of Contact: Phone Call  Patient's Preferred Phone Number on File: [phone]

## 2025-07-08 RX ORDER — SEMAGLUTIDE 2.68 MG/ML
2 INJECTION, SOLUTION SUBCUTANEOUS
Qty: 9 EACH | Refills: 1 | Status: SHIPPED | OUTPATIENT
Start: 2025-07-08

## 2025-07-29 ENCOUNTER — OFFICE VISIT (OUTPATIENT)
Dept: FAMILY MEDICINE | Facility: CLINIC | Age: 60
End: 2025-07-29
Payer: MEDICARE

## 2025-07-29 VITALS
RESPIRATION RATE: 16 BRPM | BODY MASS INDEX: 32.02 KG/M2 | SYSTOLIC BLOOD PRESSURE: 138 MMHG | HEART RATE: 66 BPM | TEMPERATURE: 98 F | WEIGHT: 163.13 LBS | HEIGHT: 60 IN | DIASTOLIC BLOOD PRESSURE: 85 MMHG | OXYGEN SATURATION: 97 %

## 2025-07-29 DIAGNOSIS — R51.9 LT FACIAL PAIN: ICD-10-CM
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
## 2025-07-29 PROCEDURE — 1159F MED LIST DOCD IN RCRD: CPT | Mod: ,,, | Performed by: NURSE PRACTITIONER

## 2025-07-29 PROCEDURE — 99212 OFFICE O/P EST SF 10 MIN: CPT | Mod: ,,, | Performed by: NURSE PRACTITIONER

## 2025-07-29 PROCEDURE — 3008F BODY MASS INDEX DOCD: CPT | Mod: ,,, | Performed by: NURSE PRACTITIONER

## 2025-07-29 PROCEDURE — 3079F DIAST BP 80-89 MM HG: CPT | Mod: ,,, | Performed by: NURSE PRACTITIONER

## 2025-07-29 PROCEDURE — 3075F SYST BP GE 130 - 139MM HG: CPT | Mod: ,,, | Performed by: NURSE PRACTITIONER

## 2025-07-29 PROCEDURE — 3066F NEPHROPATHY DOC TX: CPT | Mod: ,,, | Performed by: NURSE PRACTITIONER

## 2025-07-29 PROCEDURE — 1160F RVW MEDS BY RX/DR IN RCRD: CPT | Mod: ,,, | Performed by: NURSE PRACTITIONER

## 2025-07-29 PROCEDURE — 3044F HG A1C LEVEL LT 7.0%: CPT | Mod: ,,, | Performed by: NURSE PRACTITIONER

## 2025-07-29 PROCEDURE — 3061F NEG MICROALBUMINURIA REV: CPT | Mod: ,,, | Performed by: NURSE PRACTITIONER

## 2025-07-29 NOTE — PROGRESS NOTES
ALEX Rodriguez   [facility] [address] [place] [address]  [phone]      PATIENT NAME: Hailey Duval  : 1965  DATE: 25  MRN: [id number]      Billing Provider: ALEX Rodriguez  Level of Service:   Patient PCP Information       Provider PCP Type    ALEX Rodriguez General            Reason for Visit / Chief Complaint: Follow-up (Pt had a recent fall and was sent to ED and told to f/u here. Only c/o left sided facial pain from fall, rates pain 9/10)       Update PCP  Update Chief Complaint         History of Present Illness / Problem Focused Workflow     Hailey Duval presents to the clinic with Follow-up (Pt had a recent fall and was sent to ED and told to f/u here. Only c/o left sided facial pain from fall, rates pain 9/10)     Pt presents for follow up from a fall. Pt reports she was at the grocery store on Sat when she tripped and fell landing on the left side of her face. She was seen at [facility]. Pt reports a CT scan that showed no acute fractures. Pt reports she was told to take IBU 600mg every 4 hours. However pt reports no Rx was sent to pharmacy. She was also concerned for gi upset. Discussed the importance of taking IBU with food to decrease risk of gi issues.     Left side of face is tender and bruised. She reports pain with chewing and open/closing her mouth. Recommend soft diet for the next several days. Recommend ice pack to area bid and prn. Recommend no more than 10min per time and use caution to decrease risk of ice burn. Cont IBU as needed but to take with food.     Records have been requested.         Review of Systems     Review of Systems   HENT:  Positive for facial swelling.         Medical / Social / Family History     Past Medical History:   Diagnosis Date    Anemia     Anemia, vitamin B12 deficiency     Chronic idiopathic constipation     chronic kidney disease      Depression     Diabetes mellitus, type 2     Dysphagia     GERD (gastroesophageal reflux disease)     Hyperlipidemia     Hypertension     Left ventricular hypertrophy     Nonrheumatic tricuspid (valve) insufficiency     Obese     Osteoarthritis of knee, unspecified     Ulcer of heel and midfoot        Past Surgical History:   Procedure Laterality Date     SECTION      FOOT SURGERY      JOINT REPLACEMENT Right     Hip    OTHER SURGICAL HISTORY      Foot and Toe surgery procedure screws in right foot        Social History  Ms. Duval  reports that she has never smoked. She has never been exposed to tobacco smoke. She has never used smokeless tobacco. She reports current drug use. Drug: Marijuana. She reports that she does not drink alcohol.    Family History  Ms. Duval's family history includes Breast cancer in her sister; Diabetes in her mother; Glaucoma in an other family member; Heart disease in an other family member; Hyperlipidemia in an other family member; Hypertension in her father and mother.    Medications and Allergies     Medications  Outpatient Medications Marked as Taking for the 25 encounter (Office Visit) with Raiza David ACNP   Medication Sig Dispense Refill    alcohol swabs (BD ALCOHOL SWABS) PadM Use as directed 300 each 4    aspirin (ECOTRIN) 81 MG EC tablet Take 1 tablet (81 mg total) by mouth once daily. 90 tablet 3    blood sugar diagnostic Strp Use twice daily for glucose monitoring 150 strip 4    blood-glucose meter (ACCU-CHEK GUIDE GLUCOSE METER) Misc Use as directed 1 each 3    cariprazine (VRAYLAR) 1.5 mg Cap Take 1 capsule (1.5 mg total) by mouth once daily. 90 capsule 1    diclofenac sodium (VOLTAREN) 1 % Gel Apply 2 g topically 4 (four) times daily. 200 g 0    ergocalciferol (ERGOCALCIFEROL) 50,000 unit Cap Take 1 capsule (50,000 Units total) by mouth every 7 days. 15 capsule 3    esomeprazole (NEXIUM) 40 MG capsule TAKE 1 CAPSULE ONE TIME DAILY 90 capsule 1     glipiZIDE (GLUCOTROL) 5 MG tablet TAKE 1 TABLET TWICE DAILY WITH MEALS 180 tablet 1    hydroCHLOROthiazide (HYDRODIURIL) 25 MG tablet TAKE 1 TABLET ONE TIME DAILY 90 tablet 1    HYDROcodone-acetaminophen (NORCO)  mg per tablet Take 1 tablet by mouth daily as needed.       lancets (TRUEPLUS LANCETS) 33 gauge Misc Inject 1 lancet  into the skin Daily. 100 each 5    lovastatin (MEVACOR) 40 MG tablet TAKE 1 TABLET EVERY EVENING 90 tablet 1    metFORMIN (GLUCOPHAGE) 1000 MG tablet TAKE 1 TABLET TWICE DAILY 180 tablet 1    semaglutide (OZEMPIC) 2 mg/dose (8 mg/3 mL) PnIj Inject 2 mg into the skin every 7 days. 9 each 1    traZODone (DESYREL) 100 MG tablet Take 1 tablet (100 mg total) by mouth nightly as needed for Insomnia. 90 tablet 1       Allergies  Review of patient's allergies indicates:  No Known Allergies    Physical Examination     Vitals:    07/29/25 0821   BP: 138/85   Pulse: 66   Resp: 16   Temp: 98.2 °F (36.8 °C)     Physical Exam  HENT:      Head:        Comments: Mild swelling and tenderness with light bruising          Lab Results   Component Value Date    WBC 6.89 10/28/2024    HGB 12.1 10/28/2024    HCT 40.3 10/28/2024    MCV 87.2 10/28/2024     10/28/2024        Sodium   Date Value Ref Range Status   05/01/2025 138 136 - 145 mmol/L Final     Potassium   Date Value Ref Range Status   05/01/2025 4.1 3.5 - 5.1 mmol/L Final     Chloride   Date Value Ref Range Status   05/01/2025 101 98 - 107 mmol/L Final     CO2   Date Value Ref Range Status   05/01/2025 25 22 - 29 mmol/L Final     Glucose   Date Value Ref Range Status   05/01/2025 82 74 - 100 mg/dL Final     BUN   Date Value Ref Range Status   05/01/2025 13 10 - 20 mg/dL Final     Creatinine   Date Value Ref Range Status   05/01/2025 1.04 (H) 0.55 - 1.02 mg/dL Final     Calcium   Date Value Ref Range Status   05/01/2025 9.4 8.4 - 10.2 mg/dL Final     Total Protein   Date Value Ref Range Status   05/01/2025 7.6 6.4 - 8.3 g/dL Final     Albumin  "  Date Value Ref Range Status   05/01/2025 3.9 3.5 - 5.0 g/dL Final     Bilirubin, Total   Date Value Ref Range Status   05/01/2025 0.4 <=1.5 mg/dL Final     Alk Phos   Date Value Ref Range Status   05/01/2025 96 40 - 150 U/L Final     AST   Date Value Ref Range Status   05/01/2025 18 11 - 45 U/L Final     ALT   Date Value Ref Range Status   05/01/2025 8 <=55 U/L Final     Anion Gap   Date Value Ref Range Status   05/01/2025 16 7 - 16 mmol/L Final     eGFR   Date Value Ref Range Status   05/01/2025 62 >=60 mL/min/1.73m2 Final     Comment:     Estimated GFR calculated using the CKD-EPI creatinine (2021) equation.      Lab Results   Component Value Date    HGBA1C 5.4 05/01/2025      Lab Results   Component Value Date    CHOL 210 (H) 05/01/2025    CHOL 186 10/28/2024    CHOL 224 (H) 03/28/2024     Lab Results   Component Value Date    HDL 69 (H) 05/01/2025    HDL 72 (H) 10/28/2024    HDL 74 (H) 03/28/2024     Lab Results   Component Value Date    LDLCALC 125 05/01/2025    LDLCALC 98 10/28/2024    LDLCALC 136 03/28/2024     No results found for: "DLDL"  Lab Results   Component Value Date    TRIG 78 05/01/2025    TRIG 78 10/28/2024    TRIG 70 03/28/2024     Lab Results   Component Value Date    CHOLHDL 3.0 05/01/2025    CHOLHDL 2.6 10/28/2024    CHOLHDL 3.0 03/28/2024      Lab Results   Component Value Date    TSH 1.690 02/01/2022        Assessment and Plan (including Health Maintenance)      Problem List  Smart Sets  Document Outside HM   :    Plan:     1. Fall, subsequent encounter    2. Lt facial pain         There are no Patient Instructions on file for this visit.     There are no preventive care reminders to display for this patient.      Health Maintenance Topics with due status: Not Due       Topic Last Completion Date    Cervical Cancer Screening 09/15/2021    Colorectal Cancer Screening 08/27/2024    Influenza Vaccine 10/28/2024    Foot Exam 10/28/2024    Diabetic Eye Exam 11/06/2024    Mammogram 01/13/2025    " TETANUS VACCINE 05/01/2025    Diabetes Urine Screening 05/01/2025    Lipid Panel 05/01/2025    Hemoglobin A1c 05/01/2025    Low Dose Statin 07/07/2025    RSV Vaccine (Age 60+ and Pregnant patients) Not Due       Future Appointments   Date Time Provider Department Center   8/25/2025 10:00 AM AWV NURSE, [facility] [place] Pa[place]   11/5/2025  8:20 AM Raiza David ACNP [facility]   1/16/2026  8:00 AM [facility] MAMMO2 OB [facility] [place]            Signature:  ALEX Rodriguez [facility] [address] [place] [address]  [phone]    Date of encounter: 7/29/25

## 2025-08-18 ENCOUNTER — OFFICE VISIT (OUTPATIENT)
Dept: CARDIOLOGY | Facility: CLINIC | Age: 60
End: 2025-08-18
Payer: MEDICARE

## 2025-08-18 VITALS
HEIGHT: 61 IN | DIASTOLIC BLOOD PRESSURE: 100 MMHG | HEART RATE: 82 BPM | SYSTOLIC BLOOD PRESSURE: 118 MMHG | WEIGHT: 157 LBS | BODY MASS INDEX: 29.64 KG/M2 | OXYGEN SATURATION: 100 %

## 2025-08-18 DIAGNOSIS — I51.7 LVH (LEFT VENTRICULAR HYPERTROPHY): ICD-10-CM

## 2025-08-18 DIAGNOSIS — I10 ESSENTIAL HYPERTENSION: ICD-10-CM

## 2025-08-18 DIAGNOSIS — E78.5 HYPERLIPIDEMIA ASSOCIATED WITH TYPE 2 DIABETES MELLITUS: ICD-10-CM

## 2025-08-18 DIAGNOSIS — I10 HYPERTENSION, UNSPECIFIED TYPE: Primary | ICD-10-CM

## 2025-08-18 DIAGNOSIS — I49.1 PREMATURE ATRIAL CONTRACTIONS: ICD-10-CM

## 2025-08-18 DIAGNOSIS — E11.69 HYPERLIPIDEMIA ASSOCIATED WITH TYPE 2 DIABETES MELLITUS: ICD-10-CM

## 2025-08-18 PROCEDURE — 99214 OFFICE O/P EST MOD 30 MIN: CPT | Mod: S$PBB,,, | Performed by: NURSE PRACTITIONER

## 2025-08-18 PROCEDURE — 3008F BODY MASS INDEX DOCD: CPT | Mod: CPTII,,, | Performed by: NURSE PRACTITIONER

## 2025-08-18 PROCEDURE — 3044F HG A1C LEVEL LT 7.0%: CPT | Mod: CPTII,,, | Performed by: NURSE PRACTITIONER

## 2025-08-18 PROCEDURE — 93005 ELECTROCARDIOGRAM TRACING: CPT | Mod: PBBFAC | Performed by: INTERNAL MEDICINE

## 2025-08-18 PROCEDURE — 3061F NEG MICROALBUMINURIA REV: CPT | Mod: CPTII,,, | Performed by: NURSE PRACTITIONER

## 2025-08-18 PROCEDURE — 3074F SYST BP LT 130 MM HG: CPT | Mod: CPTII,,, | Performed by: NURSE PRACTITIONER

## 2025-08-18 PROCEDURE — 99215 OFFICE O/P EST HI 40 MIN: CPT | Mod: PBBFAC,25 | Performed by: NURSE PRACTITIONER

## 2025-08-18 PROCEDURE — 3066F NEPHROPATHY DOC TX: CPT | Mod: CPTII,,, | Performed by: NURSE PRACTITIONER

## 2025-08-18 PROCEDURE — 3080F DIAST BP >= 90 MM HG: CPT | Mod: CPTII,,, | Performed by: NURSE PRACTITIONER

## 2025-08-18 PROCEDURE — 1160F RVW MEDS BY RX/DR IN RCRD: CPT | Mod: CPTII,,, | Performed by: NURSE PRACTITIONER

## 2025-08-18 PROCEDURE — 1159F MED LIST DOCD IN RCRD: CPT | Mod: CPTII,,, | Performed by: NURSE PRACTITIONER

## 2025-08-18 PROCEDURE — 99999 PR PBB SHADOW E&M-EST. PATIENT-LVL V: CPT | Mod: PBBFAC,,, | Performed by: NURSE PRACTITIONER

## 2025-08-18 PROCEDURE — 93010 ELECTROCARDIOGRAM REPORT: CPT | Mod: S$PBB,,, | Performed by: INTERNAL MEDICINE

## 2025-08-19 LAB
OHS QRS DURATION: 66 MS
OHS QTC CALCULATION: 382 MS